# Patient Record
Sex: MALE | Race: WHITE | Employment: OTHER | URBAN - METROPOLITAN AREA
[De-identification: names, ages, dates, MRNs, and addresses within clinical notes are randomized per-mention and may not be internally consistent; named-entity substitution may affect disease eponyms.]

---

## 2017-03-31 ENCOUNTER — OFFICE VISIT (OUTPATIENT)
Dept: NEUROLOGY | Age: 62
End: 2017-03-31

## 2017-03-31 VITALS — WEIGHT: 244.8 LBS | OXYGEN SATURATION: 98 % | HEART RATE: 86 BPM | BODY MASS INDEX: 34.14 KG/M2

## 2017-03-31 DIAGNOSIS — G70.00 OCULAR MYASTHENIA GRAVIS (HCC): Primary | ICD-10-CM

## 2017-03-31 RX ORDER — FINASTERIDE 5 MG/1
5 TABLET, FILM COATED ORAL DAILY
Refills: 3 | COMMUNITY
Start: 2017-03-05

## 2017-03-31 NOTE — PROGRESS NOTES
Neurology Progress Note    Patient ID:  Phillip Rutherford  [id number]  14 y.o.  1955      Subjective:   History:  Mr. Phillip Rutherford is a 59 y.o. male who has a past medical history of Asthma; H/O seasonal allergies; Hyperlipemia; and Hypertension who was admitted at [facility] for diplopia that started last 8/6/16, noticeable on R gaze, associated with R eye discomfort. Patient (+) AChR Abs. CT chest negative for thymoma.      Mestinon 60 mg was increased to 4 times a day and prednisone 10 mg daily with note of improvement of symptoms. (+) increase saliva        Objective:   ROS:  Per HPI-  Otherwise 12 point ROS was negative    Meds:  Current Outpatient Prescriptions on File Prior to Visit   Medication Sig Dispense Refill    predniSONE (DELTASONE) 10 mg tablet Take 1 Tab by mouth daily. 90 Tab 3    pyridostigmine (MESTINON) 60 mg tablet Take 1 Tab by mouth four (4) times daily. 360 Tab 3    gabapentin (NEURONTIN) 600 mg tablet Take  by mouth two (2) times a day.  tamsulosin (FLOMAX) 0.4 mg capsule Take 0.4 mg by mouth nightly.  furosemide (LASIX) 20 mg tablet Take 20 mg by mouth daily.  fluticasone (FLONASE) 50 mcg/actuation nasal spray 2 Sprays by Both Nostrils route daily as needed.  montelukast (SINGULAIR) 10 mg tablet Take 10 mg by mouth daily.  levocetirizine (XYZAL) 5 mg tablet Take 5 mg by mouth daily.  albuterol (PROVENTIL HFA) 90 mcg/actuation inhaler Take 2 Puffs by inhalation every four (4) hours as needed.  aspirin delayed-release 81 mg tablet Take  by mouth daily.  cholecalciferol (VITAMIN D3) 1,000 unit cap Take 1,000 Units by mouth daily.  amLODIPine-atorvastatin (CADUET) 10-20 mg per tablet Take 1 Tab by mouth nightly.  irbesartan (AVAPRO) 300 mg tablet Take 300 mg by mouth nightly.  fenofibric acid (TRILIPIX ER) 135 mg capsule Take 135 mg by mouth nightly.       niacin-inositol (NIACIN FLUSH FREE) 400 mg niacin (500 mg) cap Take 1 Cap by mouth nightly.  potassium chloride SR (KLOR-CON 10) 10 mEq tablet Take 10 mEq by mouth two (2) times a day.  fluticasone-salmeterol (ADVAIR DISKUS) 250-50 mcg/dose diskus inhaler Take 1 Puff by inhalation every twelve (12) hours. No current facility-administered medications on file prior to visit. Imaging:    CT Results (recent): MRI Results (recent):      IR Results (recent):  No results found for this or any previous visit. VAS/US Results (recent):  No results found for this or any previous visit. Reviewed records in [employer] tab today    Lab Review           Exam:  Visit Vitals    Pulse 86    Wt 111 kg (244 lb 12.8 oz)    SpO2 98%    BMI 34.14 kg/m2     Gen: Awake, alert, follows commands  Appropriate appearance, normal speech. Oriented to all spheres. No visual field defect on confrontation exam.  Full eyes movement, with no nystagmus, no diplopia, no ptosis. Normal gag and swallow. All remaining cranial nerves were normal  Motor function: 5/5 in all extremities  Sensory: intact to LT, PP and JPS  Good FTN and HTS   Gait: Normal    Assessment:     1. Ocular myasthenia gravis ([address]            Plan:   1. Will try to decrease Prednisone further to 5 mg and 10 mg alternating days for 1 month; then 5 mg every day if no worsening  2. Continue Mestinon 60 mg to 4 times a day     Follow-up Disposition:  Return in about 3 months (around 6/30/2017).           Sukumar Rodriguez MD  Diplomate, American Board of Psychiatry and Neurology  Diplomate, Neuromuscular Medicine  Diplomate, American Board of Electrodiagnostic Medicine

## 2017-03-31 NOTE — PATIENT INSTRUCTIONS
10 [facility]   Statement to Patients  April 1, 2014      In an effort to ensure the large volume of patient prescription refills is processed in the most efficient and expeditious manner, we are asking our patients to assist us by calling your Pharmacy for all prescription refills, this will include also your  Mail Order Pharmacy. The pharmacy will contact our office electronically to continue the refill process. Please do not wait until the last minute to call your pharmacy. We need at least 48 hours (2days) to fill prescriptions. We also encourage you to call your pharmacy before going to  your prescription to make sure it is ready. With regard to controlled substance prescription refill requests (narcotic refills) that need to be picked up at our office, we ask your cooperation by providing us with at least 72 hours (3days) notice that you will need a refill. We will not refill narcotic prescription refill requests after 4:00pm on any weekday, Monday through Thursday, or after 2:00pm on Fridays, or on the weekends. We encourage everyone to explore another way of getting your prescription refill request processed using Blippex, our patient web portal through our electronic medical record system. Blippex is an efficient and effective way to communicate your medication request directly to the office and  downloadable as an sparkle on your smart phone . Blippex also features a review functionality that allows you to view your medication list as well as leave messages for your physician. Are you ready to get connected? If so please review the attatched instructions or speak to any of our staff to get you set up right away! Thank you so much for your cooperation. Should you have any questions please contact our Practice Administrator.     The Physicians and Staff,  [facility]

## 2017-03-31 NOTE — LETTER
3/31/2017 3:56 PM 
 
Patient:  Rogers Pill YOB: 1955 Date of Visit: 3/31/2017 Dear MD Jose Perez [address] [place] [id number] VIA Facsimile: [phone] 
 : Thank you for referring Mr. Rachel Fields to me for evaluation/treatment. Below are the relevant portions of my assessment and plan of care. If you have questions, please do not hesitate to call me. I look forward to following Mr. Gloria Denisemarikajessi along with you. Sincerely, Ivania Eric MD

## 2017-03-31 NOTE — MR AVS SNAPSHOT
Visit Information Date & Time Provider Department Dept. Phone Encounter #  
 3/31/2017  3:40 PM Jeana Guerrero MD Neurology [place] [address] [phone] [id number] Follow-up Instructions Return in about 3 months (around 6/30/2017). Upcoming Health Maintenance Date Due Hepatitis C Screening 1955 Pneumococcal 19-64 Medium Risk (1 of 1 - PPSV23) 1/15/1972 DTaP/Tdap/Td series (1 - Tdap) 1/15/1974 FOBT Q 1 YEAR AGE 50-75 1/15/2003 ZOSTER VACCINE AGE 60> 1/15/2013 INFLUENZA AGE 9 TO ADULT 8/1/2016 Allergies as of 3/31/2017  Review Complete On: 3/31/2017 By: Geovany French LPN No Known Allergies Current Immunizations  Never Reviewed No immunizations on file. Not reviewed this visit You Were Diagnosed With   
  
 Codes Comments Ocular [facility]henia gravis ([facility]    -  Primary ICD-10-CM: G70.00 ICD-9-CM: 358.00 Vitals Pulse Weight(growth percentile) SpO2 BMI Smoking Status 86 244 lb 12.8 oz (111 kg) 98% 34.14 kg/m2 Former Smoker BMI and BSA Data Body Mass Index Body Surface Area  
 34.14 kg/m 2 2.36 m 2 Preferred Pharmacy Pharmacy Name Phone [address] [place] [address], [address] [place] [address] [phone] Your Updated Medication List  
  
   
This list is accurate as of: 3/31/17  3:53 PM.  Always use your most recent med list.  
  
  
  
  
 Gavin Stands 250-50 mcg/dose diskus inhaler Generic drug:  fluticasone-salmeterol Take 1 Puff by inhalation every twelve (12) hours. aspirin delayed-release 81 mg tablet Take  by mouth daily. CADUET 10-20 mg per tablet Generic drug:  amLODIPine-atorvastatin Take 1 Tab by mouth nightly. fenofibric acid 135 mg capsule Commonly known as:  TRILIPIX ER Take 135 mg by mouth nightly. finasteride 5 mg tablet Commonly known as:  PROSCAR Take 5 mg by mouth daily. FLOMAX 0.4 mg capsule Generic drug:  tamsulosin Take 0.4 mg by mouth nightly. FLONASE 50 mcg/actuation nasal spray Generic drug:  fluticasone 2 Sprays by Both Nostrils route daily as needed. gabapentin 600 mg tablet Commonly known as:  NEURONTIN Take  by mouth two (2) times a day. irbesartan 300 mg tablet Commonly known as:  AVAPRO Take 300 mg by mouth nightly. KLOR-CON 10 10 mEq tablet Generic drug:  potassium chloride SR Take 10 mEq by mouth two (2) times a day. LASIX 20 mg tablet Generic drug:  furosemide Take 20 mg by mouth daily. levocetirizine 5 mg tablet Commonly known as:  Jeaneen Mor Take 5 mg by mouth daily. NIACIN FLUSH FREE 400 mg niacin (500 mg) Cap Generic drug:  niacin-inositol Take 1 Cap by mouth nightly. predniSONE 10 mg tablet Commonly known as:  Susen Arbour Take 1 Tab by mouth daily. PROVENTIL HFA 90 mcg/actuation inhaler Generic drug:  albuterol Take 2 Puffs by inhalation every four (4) hours as needed. pyridostigmine 60 mg tablet Commonly known as:  MESTINON Take 1 Tab by mouth four (4) times daily. SINGULAIR 10 mg tablet Generic drug:  montelukast  
Take 10 mg by mouth daily. VITAMIN D3 1,000 unit Cap Generic drug:  cholecalciferol Take 1,000 Units by mouth daily. Follow-up Instructions Return in about 3 months (around 6/30/2017). Patient Instructions PRESCRIPTION REFILL POLICY [facility] Statement to Patients April 1, 2014 In an effort to ensure the large volume of patient prescription refills is processed in the most efficient and expeditious manner, we are asking our patients to assist us by calling your Pharmacy for all prescription refills, this will include also your  Mail Order Pharmacy. The pharmacy will contact our office electronically to continue the refill process. Please do not wait until the last minute to call your pharmacy. We need at least 48 hours (2days) to fill prescriptions. We also encourage you to call your pharmacy before going to  your prescription to make sure it is ready. With regard to controlled substance prescription refill requests (narcotic refills) that need to be picked up at our office, we ask your cooperation by providing us with at least 72 hours (3days) notice that you will need a refill. We will not refill narcotic prescription refill requests after 4:00pm on any weekday, Monday through Thursday, or after 2:00pm on Fridays, or on the weekends. We encourage everyone to explore another way of getting your prescription refill request processed using Biomoda, our patient web portal through our electronic medical record system. Biomoda is an efficient and effective way to communicate your medication request directly to the office and  downloadable as an sparkle on your smart phone . Biomoda also features a review functionality that allows you to view your medication list as well as leave messages for your physician. Are you ready to get connected? If so please review the attatched instructions or speak to any of our staff to get you set up right away! Thank you so much for your cooperation. Should you have any questions please contact our Practice Administrator. The Physicians and Staff,  [facility]! Dear Kourtney Mitchell: Thank you for requesting a Biomoda account. Our records indicate that you already have an active Biomoda account. You can access your account anytime at [email or web address] Did you know that you can access your hospital and ER discharge instructions at any time in Biomoda? You can also review all of your test results from your hospital stay or ER visit. Additional Information If you have questions, please visit the Frequently Asked Questions section of the [email or web address] website at [email or web address] Remember, Redeem&Get is NOT to be used for urgent needs. For medical emergencies, dial 911. Now available from your iPhone and Android! Please provide this summary of care documentation to your next provider. Your primary care clinician is listed as Itzel Dorantes. If you have any questions after today's visit, please call [phone].

## 2017-05-01 ENCOUNTER — TELEPHONE (OUTPATIENT)
Dept: NEUROLOGY | Age: 62
End: 2017-05-01

## 2017-05-01 NOTE — TELEPHONE ENCOUNTER
----- Message from Myrna Ozuna sent at 5/1/2017  9:17 AM EDT -----  Regarding: /Telephone  Pt is requesting a callback in regards to having issues with vision also\" Prednizone' medication provided. Best contact [phone].

## 2017-05-01 NOTE — TELEPHONE ENCOUNTER
TC- Returned call to patient no answer left message on vm to increase Prednisone back to 10 mg every day.

## 2017-05-03 RX ORDER — PREDNISONE 10 MG/1
30 TABLET ORAL DAILY
Qty: 90 TAB | Refills: 2 | Status: SHIPPED | OUTPATIENT
Start: 2017-05-03 | End: 2017-07-17 | Stop reason: DRUGHIGH

## 2017-05-03 NOTE — TELEPHONE ENCOUNTER
Order placed for Prednisone 30 mg daily, # 90 tabs # 2 refills, PO, per Verbal Order from Dr. Anusha Reynolds on 5/3/2017 due to Myasthenia Gravis.

## 2017-07-17 ENCOUNTER — OFFICE VISIT (OUTPATIENT)
Dept: NEUROLOGY | Age: 62
End: 2017-07-17

## 2017-07-17 VITALS
HEART RATE: 87 BPM | WEIGHT: 236 LBS | BODY MASS INDEX: 32.92 KG/M2 | OXYGEN SATURATION: 97 % | DIASTOLIC BLOOD PRESSURE: 62 MMHG | SYSTOLIC BLOOD PRESSURE: 118 MMHG

## 2017-07-17 DIAGNOSIS — G70.00 OCULAR MYASTHENIA GRAVIS (HCC): ICD-10-CM

## 2017-07-17 DIAGNOSIS — G70.00 MYASTHENIA GRAVIS (HCC): ICD-10-CM

## 2017-07-17 RX ORDER — PREDNISONE 10 MG/1
60 TABLET ORAL DAILY
Qty: 180 TAB | Refills: 2 | Status: SHIPPED | OUTPATIENT
Start: 2017-07-17 | End: 2017-10-20 | Stop reason: SDUPTHER

## 2017-07-17 RX ORDER — PYRIDOSTIGMINE BROMIDE 60 MG/1
60 TABLET ORAL
Qty: 450 TAB | Refills: 3 | Status: SHIPPED | OUTPATIENT
Start: 2017-07-17 | End: 2017-10-20 | Stop reason: SDUPTHER

## 2017-07-17 NOTE — LETTER
7/17/2017 4:36 PM 
 
Patient:  Kelsey Christian YOB: 1955 Date of Visit: 7/17/2017 Dear Morelia Martinez MD 
[facility] [address] [facility] [address] VIA Facsimile: [phone] 
 : Thank you for referring Mr. Kenia Maxwell to me for evaluation/treatment. Below are the relevant portions of my assessment and plan of care. If you have questions, please do not hesitate to call me. I look forward to following  Margarito Maravilla along with you. Sincerely, Arvin Barraza MD

## 2017-07-17 NOTE — MR AVS SNAPSHOT
Visit Information Date & Time Provider Department Dept. Phone Encounter #  
 7/17/2017  3:40 PM MD Jessica Perez Neurology [facility] [phone] [id number] Follow-up Instructions Return in about 1 month (around 8/17/2017). Upcoming Health Maintenance Date Due Hepatitis C Screening 1955 Pneumococcal 19-64 Medium Risk (1 of 1 - PPSV23) 1/15/1972 DTaP/Tdap/Td series (1 - Tdap) 1/15/1974 FOBT Q 1 YEAR AGE 50-75 1/15/2003 ZOSTER VACCINE AGE 60> 1/15/2013 INFLUENZA AGE 9 TO ADULT 8/1/2017 Allergies as of 7/17/2017  Review Complete On: 7/17/2017 By: Lance Zamora LPN No Known Allergies Current Immunizations  Never Reviewed No immunizations on file. Not reviewed this visit You Were Diagnosed With   
  
 Codes Comments [facility]asthenia gravis ([facility]     ICD-10-CM: G70.00 ICD-9-CM: 358.00 Ocular myasthenia gravis ([facility]     ICD-10-CM: G70.00 ICD-9-CM: 358.00 Vitals BP Pulse Weight(growth percentile) SpO2 BMI Smoking Status 118/62 87 236 lb (107 kg) 97% 32.92 kg/m2 Former Smoker BMI and BSA Data Body Mass Index Body Surface Area  
 32.92 kg/m 2 2.32 m 2 Preferred Pharmacy Pharmacy Name Phone [address] [place] [address], [address] [place] [address] [phone] Your Updated Medication List  
  
   
This list is accurate as of: 7/17/17  4:34 PM.  Always use your most recent med list.  
  
  
  
  
 Maikel Cousin 250-50 mcg/dose diskus inhaler Generic drug:  fluticasone-salmeterol Take 1 Puff by inhalation every twelve (12) hours. aspirin delayed-release 81 mg tablet Take  by mouth daily. CADUET 10-20 mg per tablet Generic drug:  amLODIPine-atorvastatin Take 1 Tab by mouth nightly. fenofibric acid 135 mg capsule Commonly known as:  TRILIPIX ER Take 135 mg by mouth nightly. finasteride 5 mg tablet Commonly known as:  PROSCAR  
 Take 5 mg by mouth daily. FLOMAX 0.4 mg capsule Generic drug:  tamsulosin Take 0.4 mg by mouth nightly. FLONASE 50 mcg/actuation nasal spray Generic drug:  fluticasone 2 Sprays by Both Nostrils route daily as needed. gabapentin 600 mg tablet Commonly known as:  NEURONTIN Take  by mouth two (2) times a day. irbesartan 300 mg tablet Commonly known as:  AVAPRO Take 300 mg by mouth nightly. KLOR-CON 10 10 mEq tablet Generic drug:  potassium chloride SR Take 10 mEq by mouth two (2) times a day. LASIX 20 mg tablet Generic drug:  furosemide Take 20 mg by mouth daily. levocetirizine 5 mg tablet Commonly known as:  Johanna Jbphh Take 5 mg by mouth daily. NIACIN FLUSH FREE 400 mg niacin (500 mg) Cap Generic drug:  niacin-inositol Take 1 Cap by mouth nightly. predniSONE 10 mg tablet Commonly known as:  Alvie Karluk Take 6 Tabs by mouth daily. PROVENTIL HFA 90 mcg/actuation inhaler Generic drug:  albuterol Take 2 Puffs by inhalation every four (4) hours as needed. pyridostigmine 60 mg tablet Commonly known as:  MESTINON Take 1 Tab by mouth five (5) times daily. SINGULAIR 10 mg tablet Generic drug:  montelukast  
Take 10 mg by mouth daily. VITAMIN D3 1,000 unit Cap Generic drug:  cholecalciferol Take 1,000 Units by mouth daily. Prescriptions Sent to Pharmacy Refills  
 predniSONE (DELTASONE) 10 mg tablet 2 Sig: Take 6 Tabs by mouth daily. Class: Normal  
 Pharmacy: [address] [place] [address] Ph #: [phone] Route: Oral  
 pyridostigmine (MESTINON) 60 mg tablet 3 Sig: Take 1 Tab by mouth five (5) times daily. Class: Normal  
 Pharmacy: [address] [place] [address] Ph #: [phone] Route: Oral  
  
Follow-up Instructions Return in about 1 month (around 8/17/2017). Introducing [address]! Dear Adrien Atwood: Thank you for requesting a [employer] account. Our records indicate that you already have an active [employer] account. You can access your account anytime at [email or web address] Did you know that you can access your hospital and ER discharge instructions at any time in [employer]? You can also review all of your test results from your hospital stay or ER visit. Additional Information If you have questions, please visit the Frequently Asked Questions section of the E[employer] website at [email or web address] Remember, [employer] is NOT to be used for urgent needs. For medical emergencies, dial 911. Now available from your iPhone and Android! Please provide this summary of care documentation to your next provider. Your primary care clinician is listed as Itzel Dorantes. If you have any questions after today's visit, please call [phone].

## 2017-07-17 NOTE — PROGRESS NOTES
Neurology Progress Note    Patient ID:  Anup Delgadillo  [id number]  36 y.o.  1955      Subjective:   History:  Mr. Anup Delgadillo is a 57 y.o. Male with Asthma; H/O seasonal allergies; Hyperlipemia; and Hypertension who was admitted at [facility] for diplopia that started last 8/6/16, noticeable on R gaze, associated with R eye discomfort. Patient (+) AChR Abs. CT chest negative for thymoma.      Patient now on Mestinon 60 mg 4 times a day and had to increase Prednisone to 10 mg 3 tabs daily due to increasing diplopia, blurred vision and swallowing issues with increase saliva. (-) SOB  (-) fever    Admits to seasonal allergies needing to take Xyzal and Zyrtec.     Social Hx  Social History     Social History    Marital status:      Spouse name: N/A    Number of children: N/A    Years of education: N/A     Social History Main Topics    Smoking status: Former Smoker    Smokeless tobacco: Never Used    Alcohol use No    Drug use: None    Sexual activity: Not Asked     Other Topics Concern    None     Social History Narrative       Objective:   ROS:  Per HPI-  Otherwise the remainder of ROS was negative    Meds:  Current Outpatient Prescriptions on File Prior to Visit   Medication Sig Dispense Refill    finasteride (PROSCAR) 5 mg tablet Take 5 mg by mouth daily. 3    gabapentin (NEURONTIN) 600 mg tablet Take  by mouth two (2) times a day.  tamsulosin (FLOMAX) 0.4 mg capsule Take 0.4 mg by mouth nightly.  furosemide (LASIX) 20 mg tablet Take 20 mg by mouth daily.  fluticasone (FLONASE) 50 mcg/actuation nasal spray 2 Sprays by Both Nostrils route daily as needed.  montelukast (SINGULAIR) 10 mg tablet Take 10 mg by mouth daily.  levocetirizine (XYZAL) 5 mg tablet Take 5 mg by mouth daily.  albuterol (PROVENTIL HFA) 90 mcg/actuation inhaler Take 2 Puffs by inhalation every four (4) hours as needed.  aspirin delayed-release 81 mg tablet Take  by mouth daily.       cholecalciferol (VITAMIN D3) 1,000 unit cap Take 1,000 Units by mouth daily.  amLODIPine-atorvastatin (CADUET) 10-20 mg per tablet Take 1 Tab by mouth nightly.  irbesartan (AVAPRO) 300 mg tablet Take 300 mg by mouth nightly.  fenofibric acid (TRILIPIX ER) 135 mg capsule Take 135 mg by mouth nightly.  niacin-inositol (NIACIN FLUSH FREE) 400 mg niacin (500 mg) cap Take 1 Cap by mouth nightly.  potassium chloride SR (KLOR-CON 10) 10 mEq tablet Take 10 mEq by mouth two (2) times a day.  fluticasone-salmeterol (ADVAIR DISKUS) 250-50 mcg/dose diskus inhaler Take 1 Puff by inhalation every twelve (12) hours. No current facility-administered medications on file prior to visit. Imaging:    CT Results (recent): MRI Results (recent):      IR Results (recent):  No results found for this or any previous visit. VAS/US Results (recent):  No results found for this or any previous visit. Reviewed records in [employer] tab today    Lab Review           Exam:  Visit Vitals    /62    Pulse 87    Wt 107 kg (236 lb)    SpO2 97%    BMI 32.92 kg/m2     Gen: Awake, alert, follows commands  Appropriate appearance, normal speech. Oriented to all spheres. No visual field defect on confrontation exam.  Full eyes movement, with no nystagmus, no diplopia, no ptosis. Normal gag and swallow. All remaining cranial nerves were normal  Motor function: 5/5 in all extremities  Sensory: intact to LT, PP and JPS  DTRs ++ in all extremities, (-) Babinski  Good FTN and HTS   Gait: Normal    Assessment:     1. Myasthenia gravis ([address]    2. Ocular myasthenia gravis ([address]            Plan: 1. Increased Mestinon 60 mg 5 times a day   2. Increase prednisone 10 mg 6 tabs (60 mg) daily   3.  Advise to stop Zyrtec and Xyzal for several days prior to increasing Prednisone to see if they are exacerbating his myasthenia gravis      Follow-up Disposition:  Return in about 1 month (around 8/17/2017).           Nasima Vega MD  Diplomate, American Board of Psychiatry and Neurology  Diplomate, Neuromuscular Medicine  Diplomate, American Board of Electrodiagnostic Medicine

## 2017-08-18 ENCOUNTER — OFFICE VISIT (OUTPATIENT)
Dept: NEUROLOGY | Age: 62
End: 2017-08-18

## 2017-08-18 VITALS — DIASTOLIC BLOOD PRESSURE: 65 MMHG | OXYGEN SATURATION: 95 % | HEART RATE: 84 BPM | SYSTOLIC BLOOD PRESSURE: 115 MMHG

## 2017-08-18 DIAGNOSIS — G70.00 OCULAR MYASTHENIA GRAVIS (HCC): Primary | ICD-10-CM

## 2017-08-18 NOTE — MR AVS SNAPSHOT
Visit Information Date & Time Provider Department Dept. Phone Encounter #  
 8/18/2017 11:40 AM Monika James MD [facility] Neurology [facility] [phone] [id number] Follow-up Instructions Return in about 2 months (around 10/18/2017). Follow-up and Disposition History Your Appointments 10/20/2017 10:40 AM  
Follow Up with Monika James MD  
[facility] Appt Note: Myasthenia Gravis [place] [address] [facility] [id number] [phone]  
  
   
 [place] [address] [id number] [address] Upcoming Health Maintenance Date Due Hepatitis C Screening 1955 Pneumococcal 19-64 Medium Risk (1 of 1 - PPSV23) 1/15/1972 DTaP/Tdap/Td series (1 - Tdap) 1/15/1974 FOBT Q 1 YEAR AGE 50-75 1/15/2003 ZOSTER VACCINE AGE 60> 11/15/2012 INFLUENZA AGE 9 TO ADULT 8/1/2017 Allergies as of 8/18/2017  Review Complete On: 8/18/2017 By: Guillaume Barron No Known Allergies Current Immunizations  Never Reviewed No immunizations on file. Not reviewed this visit You Were Diagnosed With   
  
 Codes Comments O[facility]    -  Primary ICD-10-CM: G70.00 ICD-9-CM: 358.00 Vitals BP Pulse SpO2 Smoking Status 115/65 84 95% Former Smoker Preferred Pharmacy Pharmacy Name Phone [address] [place] [address], [facility] Elisa Harris [phone] Your Updated Medication List  
  
   
This list is accurate as of: 8/18/17 12:15 PM.  Always use your most recent med list.  
  
  
  
  
 Donte Lalo 250-50 mcg/dose diskus inhaler Generic drug:  fluticasone-salmeterol Take 1 Puff by inhalation every twelve (12) hours. aspirin delayed-release 81 mg tablet Take  by mouth daily. CADUET 10-20 mg per tablet Generic drug:  amLODIPine-atorvastatin Take 1 Tab by mouth nightly. fenofibric acid 135 mg capsule Commonly known as:  TRILIPIX ER Take 135 mg by mouth nightly. finasteride 5 mg tablet Commonly known as:  PROSCAR Take 5 mg by mouth daily. FLOMAX 0.4 mg capsule Generic drug:  tamsulosin Take 0.4 mg by mouth nightly. FLONASE 50 mcg/actuation nasal spray Generic drug:  fluticasone 2 Sprays by Both Nostrils route daily as needed. gabapentin 600 mg tablet Commonly known as:  NEURONTIN Take  by mouth two (2) times a day. irbesartan 300 mg tablet Commonly known as:  AVAPRO Take 300 mg by mouth nightly. KLOR-CON 10 10 mEq tablet Generic drug:  potassium chloride SR Take 10 mEq by mouth two (2) times a day. LASIX 20 mg tablet Generic drug:  furosemide Take 20 mg by mouth daily. levocetirizine 5 mg tablet Commonly known as:  Dana Meager Take 5 mg by mouth daily. NIACIN FLUSH FREE 400 mg niacin (500 mg) Cap Generic drug:  niacin-inositol Take 1 Cap by mouth nightly. predniSONE 10 mg tablet Commonly known as:  Edrie Power Take 6 Tabs by mouth daily. PROVENTIL HFA 90 mcg/actuation inhaler Generic drug:  albuterol Take 2 Puffs by inhalation every four (4) hours as needed. pyridostigmine 60 mg tablet Commonly known as:  MESTINON Take 1 Tab by mouth five (5) times daily. SINGULAIR 10 mg tablet Generic drug:  montelukast  
Take 10 mg by mouth daily. VITAMIN D3 1,000 unit Cap Generic drug:  cholecalciferol Take 1,000 Units by mouth daily. Follow-up Instructions Return in about 2 months (around 10/18/2017). Patient Instructions PRESCRIPTION REFILL POLICY [facility] Statement to Patients April 1, 2014 In an effort to ensure the large volume of patient prescription refills is processed in the most efficient and expeditious manner, we are asking our patients to assist us by calling your Pharmacy for all prescription refills, this will include also your  Mail Order Pharmacy. The pharmacy will contact our office electronically to continue the refill process. Please do not wait until the last minute to call your pharmacy. We need at least 48 hours (2days) to fill prescriptions. We also encourage you to call your pharmacy before going to  your prescription to make sure it is ready. With regard to controlled substance prescription refill requests (narcotic refills) that need to be picked up at our office, we ask your cooperation by providing us with at least 72 hours (3days) notice that you will need a refill. We will not refill narcotic prescription refill requests after 4:00pm on any weekday, Monday through Thursday, or after 2:00pm on Fridays, or on the weekends. We encourage everyone to explore another way of getting your prescription refill request processed using [employer], our patient web portal through our electronic medical record system. [employer] is an efficient and effective way to communicate your medication request directly to the office and  downloadable as an sparkle on your smart phone . [employer] also features a review functionality that allows you to view your medication list as well as leave messages for your physician. Are you ready to get connected? If so please review the attatched instructions or speak to any of our staff to get you set up right away! Thank you so much for your cooperation. Should you have any questions please contact our Practice Administrator. The Physicians and Staff,  [address] [place] [address] Neurology Clinic [facility]! Dear Deandre Vanessa: Thank you for requesting a [employer] account. Our records indicate that you already have an active [employer] account. You can access your account anytime at [email or web address] Did you know that you can access your hospital and ER discharge instructions at any time in [employer]? You can also review all of your test results from your hospital stay or ER visit. Additional Information If you have questions, please visit the Frequently Asked Questions section of the [employer] website at [email or web address] Remember, [employer] is NOT to be used for urgent needs. For medical emergencies, dial 911. Now available from your iPhone and Android! Please provide this summary of care documentation to your next provider. Your primary care clinician is listed as Itzel Dorantes. If you have any questions after today's visit, please call [phone].

## 2017-08-18 NOTE — PROGRESS NOTES
Patient says he has been improving since he started the higher doses of prednisone. He says the Mestinon wears off after 2 hours and he starts to have trouble with his speech and vision.

## 2017-08-18 NOTE — PROGRESS NOTES
Neurology Progress Note    Patient ID:  Lashawn Mesa  [id number]  47 y.o.  1955      Subjective:   History:  Mr. Lashawn Mesa is a 57 y.o. Male with Asthma; H/O seasonal allergies; Hyperlipemia; and Hypertension who was admitted at [facility] for [address] started last 8/6/16, noticeable on R gaze, associated with R eye discomfort. Patient (+) AChR Abs. CT chest negative for thymoma.      Patient was placed on Prednisone 60 mg for several days then tapered down to now on 10 mg for 2 weeks with no worsening. Patient now on Mestinon 60 mg 5 times/ day but still note wearing off 30 mins prior to next dose. Tried stopping his allergy medicine but made his sinus worse. ROS:  Per HPI-  Otherwise the remainder of ROS was negative    Social Hx  Social History     Social History    Marital status:      Spouse name: N/A    Number of children: N/A    Years of education: N/A     Social History Main Topics    Smoking status: Former Smoker    Smokeless tobacco: Never Used    Alcohol use No    Drug use: None    Sexual activity: Not Asked     Other Topics Concern    None     Social History Narrative       Meds:  Current Outpatient Prescriptions on File Prior to Visit   Medication Sig Dispense Refill    predniSONE (DELTASONE) 10 mg tablet Take 6 Tabs by mouth daily. 180 Tab 2    pyridostigmine (MESTINON) 60 mg tablet Take 1 Tab by mouth five (5) times daily. 450 Tab 3    finasteride (PROSCAR) 5 mg tablet Take 5 mg by mouth daily. 3    gabapentin (NEURONTIN) 600 mg tablet Take  by mouth two (2) times a day.  tamsulosin (FLOMAX) 0.4 mg capsule Take 0.4 mg by mouth nightly.  furosemide (LASIX) 20 mg tablet Take 20 mg by mouth daily.  fluticasone (FLONASE) 50 mcg/actuation nasal spray 2 Sprays by Both Nostrils route daily as needed.  levocetirizine (XYZAL) 5 mg tablet Take 5 mg by mouth daily.       albuterol (PROVENTIL HFA) 90 mcg/actuation inhaler Take 2 Puffs by inhalation every four (4) hours as needed.  aspirin delayed-release 81 mg tablet Take  by mouth daily.  cholecalciferol (VITAMIN D3) 1,000 unit cap Take 1,000 Units by mouth daily.  amLODIPine-atorvastatin (CADUET) 10-20 mg per tablet Take 1 Tab by mouth nightly.  irbesartan (AVAPRO) 300 mg tablet Take 300 mg by mouth nightly.  fenofibric acid (TRILIPIX ER) 135 mg capsule Take 135 mg by mouth nightly.  niacin-inositol (NIACIN FLUSH FREE) 400 mg niacin (500 mg) cap Take 1 Cap by mouth nightly.  potassium chloride SR (KLOR-CON 10) 10 mEq tablet Take 10 mEq by mouth two (2) times a day.  fluticasone-salmeterol (ADVAIR DISKUS) 250-50 mcg/dose diskus inhaler Take 1 Puff by inhalation every twelve (12) hours.  montelukast (SINGULAIR) 10 mg tablet Take 10 mg by mouth daily. No current facility-administered medications on file prior to visit. Imaging:    CT Results (recent):    Results from Hospital Encounter encounter on 10/11/16   CT CHEST W CONT   Narrative INDICATION: Myasthenia gravis. Evaluate for thymoma. COMPARISON: December 18, 2009 abdomen CT    TECHNIQUE:  Following the uneventful intravenous administration of 100 cc  Isovue-300, 5 mm axial images were obtained through the chest. CT dose reduction  was achieved through use of a standardized protocol tailored for this  examination and automatic exposure control for dose modulation. FINDINGS:    THYROID: No nodule. MEDIASTINUM: No mass or lymphadenopathy. No residual thymic tissue is  identified. JANESSA: No mass or lymphadenopathy. THORACIC AORTA: No dissection or aneurysm. MAIN PULMONARY ARTERY: Normal in caliber. TRACHEA/BRONCHI: Patent. ESOPHAGUS: No wall thickening or dilatation. HEART: Normal in size. Tiny pericardial effusion. Coronary artery calcifications  are present. PLEURA: No effusion or pneumothorax. LUNGS: 3 mm left upper lobe pulmonary nodule (series 2, image 35). Otherwise  clear.   INCIDENTALLY IMAGED UPPER ABDOMEN: No focal abnormality. BONES: No destructive bone lesion. Impression IMPRESSION:  No evidence of thymoma. Indeterminate 3 mm left upper lobe pulmonary nodule;  please see follow-up recommendations below. RECOMMENDATIONS FOR FOLLOW-UP AND MANAGEMENT OF NODULES SMALLER THAN 8 MM  DETECTED INCIDENTALLY AT NONSCREENING CT:    LOW-RISK PATIENTS:    LESS THAN OR EQUAL TO 4 MM:  No follow-up needed. GREATER THAN 4-6 MM:  Follow-up CT at 12 mo; if unchanged, no further follow-up. GREATER THAN 6-8 MM:  Initial follow-up CT at 6-12 months then at 18-24 months  if no change. GREATER THAN 8 MM:  Follow-up CT at around 3, 9, and 24 months, dynamic  contrast-enhanced CT, PET, and/or biopsy. HIGH-RISK PATIENTS:    LESS THAN OR EQUAL TO 4 MM:  Follow-up CT at 12 months; if unchanged, no further  follow-up. GREATER THAN 4-6 MM:  Initial follow-up CT at 6-12 months then at 18-24 months  if no change. GREATER THAN 6-8 MM:  Initial follow-up CT at 3-6 months then at 9-12 and 24  months if no change. GREATER THAN 8 MM:  Same as for low-risk patient. MRI Results (recent):    Results from [place] encounter on 09/09/16   MRI BRAIN WO CONT   Addendum Addendum: INDICATION: TIA/CVA. Double vision. EXAM: MRA of the intracranial vasculature was performed. FINDINGS:  The intracranial internal carotid arteries demonstrate normal signal. The  middle and anterior cerebral vessels demonstrate normal signal without major  occlusive change or obvious aneurysm or vascular malformation. The vertebrobasilar system is patent. The posterior cerebral arteries demonstrate normal signal.  IMPRESSION:  No definite major occlusive changes involving the intracranial  vasculature. No definite aneurysm or vascular malformation.        Soren Lora MD 9/10/2016  1:29 AM             Narrative *PRELIMINARY REPORT*    No evidence for acute infarction or acute intracranial abnormality. Mild white  matter disease is nonspecific but may represent changes of chronic small vessel  ischemic disease. MRA of the brain without evidence for acute large vessel arterial occlusion or  significant stenosis. No evidence for aneurysm    Preliminary report was provided by Dr. Raydell Buerger, the on-call radiologist, at  10:27 PM 9/9/2016    Final report to follow. *END PRELIMINARY REPORT*    INDICATION: TIA/CVA. Double vision. EXAM: Sagittal and axial and coronal images were obtained through the brain in  varying sequences. T1-weighted, T2-weighted, FLAIR, GRE, Diffusion-weighted  sequences may be utilized. FINDINGS:    Comparison study: None are available. Sagittal images demonstrate moderate prominence of the basilar cisterns,  cortical sulci, and ventricles. Signal void is present in the cavernous carotid  arteries bilaterally. Axial and coronal images demonstrate no confluent infarct or hemorrhage or mass  effect. Mild multifocal hyperintensities are present in the periventricular deep  white matter and centrum semiovale likely microvascular changes related to  aging. Diffusion-weighted images demonstrate no acute infarct. Impression IMPRESSION:    No acute infarct, hemorrhage, or mass effect. IR Results (recent):  No results found for this or any previous visit. VAS/US Results (recent):  No results found for this or any previous visit. Reviewed records in [email or web address] and media tab today    Lab Review     No visits with results within 3 Month(s) from this visit.   Latest known visit with results is:    Admission on 09/09/2016, Discharged on 09/10/2016   Component Date Value Ref Range Status    Ventricular Rate 09/09/2016 78  BPM Final    Atrial Rate 09/09/2016 78  BPM Final    P-R Interval 09/09/2016 186  ms Final    QRS Duration 09/09/2016 90  ms Final    Q-T Interval 09/09/2016 356  ms Final    QTC Calculation (Bezet) 09/09/2016 405  ms Final    Calculated P Axis 09/09/2016 56  degrees Final    Calculated R Axis 09/09/2016 8  degrees Final    Calculated T Axis 09/09/2016 41  degrees Final    Diagnosis 09/09/2016    Final                    Value:Normal sinus rhythm  Normal ECG  No previous ECGs available  Confirmed by Anthony Vivas MD ([id number]) on 9/10/2016 11:38:24 AM      WBC 09/09/2016 6.8  4.1 - 11.1 K/uL Final    RBC 09/09/2016 4.28  4.10 - 5.70 M/uL Final    HGB 09/09/2016 12.4  12.1 - 17.0 g/dL Final    HCT 09/09/2016 38.4  36.6 - 50.3 % Final    MCV 09/09/2016 89.7  80.0 - 99.0 FL Final    MCH 09/09/2016 29.0  26.0 - 34.0 PG Final    MCHC 09/09/2016 32.3  30.0 - 36.5 g/dL Final    RDW 09/09/2016 15.0* 11.5 - 14.5 % Final    PLATELET 18/12/5710 026  150 - 400 K/uL Final    NEUTROPHILS 09/09/2016 58  32 - 75 % Final    LYMPHOCYTES 09/09/2016 31  12 - 49 % Final    MONOCYTES 09/09/2016 8  5 - 13 % Final    EOSINOPHILS 09/09/2016 2  0 - 7 % Final    BASOPHILS 09/09/2016 1  0 - 1 % Final    ABS. NEUTROPHILS 09/09/2016 4.0  1.8 - 8.0 K/UL Final    ABS. LYMPHOCYTES 09/09/2016 2.1  0.8 - 3.5 K/UL Final    ABS. MONOCYTES 09/09/2016 0.6  0.0 - 1.0 K/UL Final    ABS. EOSINOPHILS 09/09/2016 0.1  0.0 - 0.4 K/UL Final    ABS.  BASOPHILS 09/09/2016 0.1  0.0 - 0.1 K/UL Final    Sodium 09/09/2016 142  136 - 145 mmol/L Final    Potassium 09/09/2016 4.1  3.5 - 5.1 mmol/L Final    Chloride 09/09/2016 107  97 - 108 mmol/L Final    CO2 09/09/2016 29  21 - 32 mmol/L Final    Anion gap 09/09/2016 6  5 - 15 mmol/L Final    Glucose 09/09/2016 91  65 - 100 mg/dL Final    BUN 09/09/2016 31* 6 - 20 MG/DL Final    Creatinine 09/09/2016 1.27  0.70 - 1.30 MG/DL Final    BUN/Creatinine ratio 09/09/2016 24* 12 - 20   Final    GFR est AA 09/09/2016 >60  >60 ml/min/1.73m2 Final    GFR est non-AA 09/09/2016 57* >60 ml/min/1.73m2 Final    Comment: Estimated GFR is calculated using the IDMS-traceable Modification of Diet in Renal Disease (MDRD) Study equation, reported for both  Americans (GFRAA) and non- Americans (GFRNA), and normalized to 1.73m2 body surface area. The physician must decide which value applies to the patient. The MDRD study equation should only be used in individuals age 25 or older. It has not been validated for the following: pregnant women, patients with serious comorbid conditions, or on certain medications, or persons with extremes of body size, muscle mass, or nutritional status.  Calcium 09/09/2016 8.9  8.5 - 10.1 MG/DL Final    Bilirubin, total 09/09/2016 0.3  0.2 - 1.0 MG/DL Final    ALT (SGPT) 09/09/2016 25  12 - 78 U/L Final    AST (SGOT) 09/09/2016 11* 15 - 37 U/L Final    Alk. phosphatase 09/09/2016 38* 45 - 117 U/L Final    Protein, total 09/09/2016 6.9  6.4 - 8.2 g/dL Final    Albumin 09/09/2016 3.9  3.5 - 5.0 g/dL Final    Globulin 09/09/2016 3.0  2.0 - 4.0 g/dL Final    A-G Ratio 09/09/2016 1.3  1.1 - 2.2   Final    Troponin-I, Qt. 09/09/2016 <0.04  <0.05 ng/mL Final    Comment: The presence of detectable troponin above the reference range indicates myocardial injury which may be due to ischemia, myocarditis, trauma, etc.  Clinical correlation is necessary to establish the significance of this finding. Sequential testing is recommended to determine if the typical rise and fall of cTnI is demonstrated. Note:  Cardiac troponin I has a relatively long half life and may be present well after the CK MB has returned to baseline. The reference range is based on the 99th percentile of the referent population.       Color 09/09/2016 YELLOW/STRAW    Final    Color Reference Range: Straw, Yellow or Dark Yellow    Appearance 09/09/2016 CLEAR  CLEAR   Final    Specific gravity 09/09/2016 1.025  1.003 - 1.030   Final    pH (UA) 09/09/2016 6.0  5.0 - 8.0   Final    Protein 09/09/2016 NEGATIVE   NEG mg/dL Final    Glucose 09/09/2016 NEGATIVE   NEG mg/dL Final    Ketone 09/09/2016 NEGATIVE   NEG mg/dL Final    Bilirubin 09/09/2016 NEGATIVE   NEG   Final    Blood 09/09/2016 NEGATIVE   NEG   Final    Urobilinogen 09/09/2016 0.2  0.2 - 1.0 EU/dL Final    Nitrites 09/09/2016 NEGATIVE   NEG   Final    Leukocyte Esterase 09/09/2016 NEGATIVE   NEG   Final    UA:UC IF INDICATED 09/09/2016 CULTURE NOT INDICATED BY UA RESULT  CNI   Final    WBC 09/09/2016 0-4  0 - 4 /hpf Final    RBC 09/09/2016 0-5  0 - 5 /hpf Final    Epithelial cells 09/09/2016 FEW  FEW /lpf Final    Epithelial cell category consists of squamous cells and /or transitional urothelial cells. Renal tubular cells, if present, are separately identified as such.  Bacteria 09/09/2016 NEGATIVE   NEG /hpf Final    Hyaline cast 09/09/2016 0-2  0 - 5 /lpf Final    AChR Binding Ab, serum 09/09/2016 0.55* 0.00 - 0.24 nmol/L Final    Comment: (NOTE)                                Negative:   0.00 - 0.24                                Borderline: 0.25 - 0.40                                Positive:        > 0.40  [facility]formed At: [facility] [address] [id number]  Jeffrey Jackson MD PO:[id number]      AChR Blocking Ab 09/09/2016 30* 0 - 25 % Final    Comment: (NOTE)                                Negative:      0 - 25                                Borderline:   26 - 30                                Positive:         >30  Results for this test are for research purposes  only by the assay's . The performance  characteristics of this product have not been  established. Results should not be used as a  diagnostic procedure without confirmation of the  diagnosis by another medically established  diagnostic product or procedure.   Performed At: [facility] [address] [id number]  Jeffrey Jackson MD RD:[id number]      AChR Modulating Ab 09/09/2016 23* 0 - 20 % Final    Comment: (NOTE)                               Negative:          <21                               Equivocal:     21 - 25                               Positive:          >25  The assay is linear between values of 12 and 64. Those <12 and >64 are reported as such. No single  value for ACR-modulating antibody should be used as  a sole basis for diagnosis or response to therapy. Performed At: [address] [id number]  Morteza Dalal MD OF:[id number]      Hemoglobin A1c 09/09/2016 5.6  4.2 - 6.3 % Final    Est. average glucose 09/09/2016 114  mg/dL Final    Comment: (NOTE)  The eAG should be interpreted with patient characteristics in mind   since ethnicity, interindividual differences, red cell lifespan,   variation in rates of glycation, etc. may affect the validity of the   calculation.  Sed rate, automated 09/09/2016 7  0 - 20 mm/hr Final    Sodium 09/10/2016 141  136 - 145 mmol/L Final    Potassium 09/10/2016 3.7  3.5 - 5.1 mmol/L Final    Chloride 09/10/2016 108  97 - 108 mmol/L Final    CO2 09/10/2016 25  21 - 32 mmol/L Final    Anion gap 09/10/2016 8  5 - 15 mmol/L Final    Glucose 09/10/2016 90  65 - 100 mg/dL Final    BUN 09/10/2016 25* 6 - 20 MG/DL Final    Creatinine 09/10/2016 1.07  0.70 - 1.30 MG/DL Final    BUN/Creatinine ratio 09/10/2016 23* 12 - 20   Final    GFR est AA 09/10/2016 >60  >60 ml/min/1.73m2 Final    GFR est non-AA 09/10/2016 >60  >60 ml/min/1.73m2 Final    Calcium 09/10/2016 8.5  8.5 - 10.1 MG/DL Final    Bilirubin, total 09/10/2016 0.9  0.2 - 1.0 MG/DL Final    ALT (SGPT) 09/10/2016 20  12 - 78 U/L Final    AST (SGOT) 09/10/2016 12* 15 - 37 U/L Final    Alk.  phosphatase 09/10/2016 30* 45 - 117 U/L Final    Protein, total 09/10/2016 5.9* 6.4 - 8.2 g/dL Final    Albumin 09/10/2016 3.4* 3.5 - 5.0 g/dL Final    Globulin 09/10/2016 2.5  2.0 - 4.0 g/dL Final    A-G Ratio 09/10/2016 1.4  1.1 - 2.2   Final    WBC 09/10/2016 10.7  4.1 - 11.1 K/uL Final    RBC 09/10/2016 4.16  4.10 - 5.70 M/uL Final    HGB 09/10/2016 12.1  12.1 - 17.0 g/dL Final    HCT 09/10/2016 37.2  36.6 - 50.3 % Final    MCV 09/10/2016 89.4  80.0 - 99.0 FL Final    MCH 09/10/2016 29.1  26.0 - 34.0 PG Final    MCHC 09/10/2016 32.5  30.0 - 36.5 g/dL Final    RDW 09/10/2016 14.9* 11.5 - 14.5 % Final    PLATELET 8436 109  150 - 400 K/uL Final    NEUTROPHILS 09/10/2016 70  32 - 75 % Final    LYMPHOCYTES 09/10/2016 19  12 - 49 % Final    MONOCYTES 09/10/2016 10  5 - 13 % Final    EOSINOPHILS 09/10/2016 1  0 - 7 % Final    BASOPHILS 09/10/2016 0  0 - 1 % Final    ABS. NEUTROPHILS 09/10/2016 7.5  1.8 - 8.0 K/UL Final    ABS. LYMPHOCYTES 09/10/2016 2.0  0.8 - 3.5 K/UL Final    ABS. MONOCYTES 09/10/2016 1.1* 0.0 - 1.0 K/UL Final    ABS. EOSINOPHILS 09/10/2016 0.1  0.0 - 0.4 K/UL Final    ABS. BASOPHILS 09/10/2016 0.0  0.0 - 0.1 K/UL Final    Magnesium 09/10/2016 2.1  1.6 - 2.4 mg/dL Final    Phosphorus 09/10/2016 3.5  2.6 - 4.7 MG/DL Final    TSH 09/10/2016 2.29  0.36 - 3.74 uIU/mL Final    Comment: (NOTE)  Due to TSH heterogeneity, both structurally and degree of   glycosylation, monoclonal antibodies used in the TSH assay may not   accurately quantitate TSH. Therefore, this result should be   correlated with clinical findings as well as with other assessments   of thyroid function, e.g., free T4, free T3.  Lyme Disease Ab, IgM, QT 09/10/2016 <0.80  0.00 - 0.79 index Final    Comment: (NOTE)                                 Negative         <0.80                                 Equivocal  0.80 - 1.19                                 Positive         >1.19  IgM levels may peak at 3-6 weeks post infection, then  gradually decline. Performed At: [address] [id number]  Brianna Turk MD F           Objective:       Exam:  Visit Vitals    /65    Pulse 84    SpO2 95%     Gen: Awake, alert, follows commands  Appropriate appearance, normal speech. Oriented to all spheres.   No visual field defect on confrontation exam.  Full eyes movement, with no nystagmus, no diplopia, no ptosis. Normal gag and swallow. All remaining cranial nerves were normal  Motor function: 5/5 in all extremities  Sensory: intact to LT, PP and JPS  Good FTN and HTS   Gait: Normal    Assessment:       ICD-10-CM ICD-9-CM    1. 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 Increased Mestinon 60 mg 6 times a day   2. Continue Prednisone 10 mg daily   3. Reminded about GI and bone prophylaxis    Follow-up Disposition:  Return in about 2 months (around 10/18/2017).           Sumeet Coronel MD  Diplomate, American Board of Psychiatry and Neurology  Diplomate, Neuromuscular Medicine  Diplomate, American Board of Electrodiagnostic Medicine

## 2017-08-18 NOTE — PATIENT INSTRUCTIONS
[facility]   Statement to Patients  April 1, 2014      In an effort to ensure the large volume of patient prescription refills is processed in the most efficient and expeditious manner, we are asking our patients to assist us by calling your Pharmacy for all prescription refills, this will include also your  Mail Order Pharmacy. The pharmacy will contact our office electronically to continue the refill process. Please do not wait until the last minute to call your pharmacy. We need at least 48 hours (2days) to fill prescriptions. We also encourage you to call your pharmacy before going to  your prescription to make sure it is ready. With regard to controlled substance prescription refill requests (narcotic refills) that need to be picked up at our office, we ask your cooperation by providing us with at least 72 hours (3days) notice that you will need a refill. We will not refill narcotic prescription refill requests after 4:00pm on any weekday, Monday through Thursday, or after 2:00pm on Fridays, or on the weekends. We encourage everyone to explore another way of getting your prescription refill request processed using [employer], our patient web portal through our electronic medical record system. [employer] is an efficient and effective way to communicate your medication request directly to the office and  downloadable as an sparkle on your smart phone . [employer] also features a review functionality that allows you to view your medication list as well as leave messages for your physician. Are you ready to get connected? If so please review the attatched instructions or speak to any of our staff to get you set up right away! Thank you so much for your cooperation. Should you have any questions please contact our Practice Administrator.     The Physicians and Staff,  [facility]

## 2017-10-20 ENCOUNTER — OFFICE VISIT (OUTPATIENT)
Dept: NEUROLOGY | Age: 62
End: 2017-10-20

## 2017-10-20 VITALS
DIASTOLIC BLOOD PRESSURE: 60 MMHG | OXYGEN SATURATION: 98 % | BODY MASS INDEX: 32.78 KG/M2 | HEART RATE: 87 BPM | SYSTOLIC BLOOD PRESSURE: 110 MMHG | WEIGHT: 235 LBS

## 2017-10-20 DIAGNOSIS — G70.00 OCULAR MYASTHENIA GRAVIS (HCC): ICD-10-CM

## 2017-10-20 RX ORDER — PYRIDOSTIGMINE BROMIDE 60 MG/1
60 TABLET ORAL
Qty: 630 TAB | Refills: 3 | Status: SHIPPED | OUTPATIENT
Start: 2017-10-20 | End: 2018-06-01 | Stop reason: SDUPTHER

## 2017-10-20 RX ORDER — PREDNISONE 10 MG/1
40 TABLET ORAL DAILY
Qty: 360 TAB | Refills: 3 | Status: SHIPPED | OUTPATIENT
Start: 2017-10-20 | End: 2018-08-09 | Stop reason: SDUPTHER

## 2017-10-20 NOTE — MR AVS SNAPSHOT
Visit Information Date & Time Provider Department Dept. Phone Encounter #  
 10/20/2017 10:40 AM Winter Martins MD [facility] [phone] [id number] Follow-up Instructions Return in about 6 months (around 4/20/2018). Upcoming Health Maintenance Date Due Hepatitis C Screening 1955 Pneumococcal 19-64 Medium Risk (1 of 1 - PPSV23) 1/15/1972 DTaP/Tdap/Td series (1 - Tdap) 1/15/1974 FOBT Q 1 YEAR AGE 50-75 1/15/2003 ZOSTER VACCINE AGE 60> 11/15/2012 INFLUENZA AGE 9 TO ADULT 8/1/2017 Allergies as of 10/20/2017  Review Complete On: 8/18/2017 By: Tima Ross No Known Allergies Current Immunizations  Never Reviewed No immunizations on file. Not reviewed this visit You Were Diagnosed With   
  
 Codes Comments Ocular myasthenia gravis ([facility]     ICD-10-CM: G70.00 ICD-9-CM: 358.00 Vitals BP Pulse Weight(growth percentile) SpO2 BMI Smoking Status 110/60 87 235 lb (106.6 kg) 98% 32.78 kg/m2 Former Smoker BMI and BSA Data Body Mass Index Body Surface Area 32.78 kg/m 2 2.31 m 2 Preferred Pharmacy Pharmacy Name Phone [address] [place] [address], [address] [place] [place] [address] [phone] Your Updated Medication List  
  
   
This list is accurate as of: 10/20/17 11:40 AM.  Always use your most recent med list.  
  
  
  
  
 Earnest Sovereign 250-50 mcg/dose diskus inhaler Generic drug:  fluticasone-salmeterol Take 1 Puff by inhalation every twelve (12) hours. aspirin delayed-release 81 mg tablet Take  by mouth daily. CADUET 10-20 mg per tablet Generic drug:  amLODIPine-atorvastatin Take 1 Tab by mouth nightly. fenofibric acid 135 mg capsule Commonly known as:  TRILIPIX ER Take 135 mg by mouth nightly. finasteride 5 mg tablet Commonly known as:  PROSCAR Take 5 mg by mouth daily. FLOMAX 0.4 mg capsule Generic drug:  tamsulosin Take 0.4 mg by mouth nightly. FLONASE 50 mcg/actuation nasal spray Generic drug:  fluticasone 2 Sprays by Both Nostrils route daily as needed. gabapentin 600 mg tablet Commonly known as:  NEURONTIN Take  by mouth two (2) times a day. irbesartan 300 mg tablet Commonly known as:  AVAPRO Take 300 mg by mouth nightly. KLOR-CON 10 10 mEq tablet Generic drug:  potassium chloride SR Take 10 mEq by mouth two (2) times a day. LASIX 20 mg tablet Generic drug:  furosemide Take 20 mg by mouth daily. levocetirizine 5 mg tablet Commonly known as:  Lenard [place] Take 5 mg by mouth daily. NIACIN FLUSH FREE 400 mg niacin (500 mg) Cap Generic drug:  niacin-inositol Take 1 Cap by mouth nightly. predniSONE 10 mg tablet Commonly known as:  Michelle Mons Take 4 Tabs by mouth daily. PROVENTIL HFA 90 mcg/actuation inhaler Generic drug:  albuterol Take 2 Puffs by inhalation every four (4) hours as needed. pyridostigmine 60 mg tablet Commonly known as:  MESTINON Take 1 Tab by mouth Before meals, after meals and at bedtime. SINGULAIR 10 mg tablet Generic drug:  montelukast  
Take 10 mg by mouth daily. VITAMIN D3 1,000 unit Cap Generic drug:  cholecalciferol Take 1,000 Units by mouth daily. Prescriptions Sent to Pharmacy Refills  
 pyridostigmine (MESTINON) 60 mg tablet 3 Sig: Take 1 Tab by mouth Before meals, after meals and at bedtime. Class: Normal  
 [facility] [address] [place] [address]  Ph #: [phone] Route: Oral  
 predniSONE (DELTASONE) 10 mg tablet 3 Sig: Take 4 Tabs by mouth daily. Class: Normal  
 Pharmacy: [address] [place] [address], [facility] Ph #: [phone] Route: Oral  
  
Follow-up Instructions Return in about 6 months (around 4/20/2018). Introducing [facility] SERVICES! Dear Napoleon Gain: Thank you for requesting a [employer] account. Our records indicate that you already have an active [employer] account. You can access your account anytime at [email or web address] Did you know that you can access your hospital and ER discharge instructions at any time in [employer]? You can also review all of your test results from your hospital stay or ER visit. Additional Information If you have questions, please visit the Frequently Asked Questions section of the [employer] website at [email or web address] Remember, [employer] is NOT to be used for urgent needs. For medical emergencies, dial 911. Now available from your iPhone and Android! Please provide this summary of care documentation to your next provider. Your primary care clinician is listed as Itzel Dorantes. If you have any questions after today's visit, please call [phone].

## 2017-10-20 NOTE — LETTER
10/20/2017 11:44 AM 
 
Patient:  Tayler Crum YOB: 1955 Date of Visit: 10/20/2017 Dear MD Declan Alvarenga [facility] [address] [id number] VIA Facsimile: [phone] 
 : Thank you for referring Mr. Ira Vieyra to me for evaluation/treatment. Below are the relevant portions of my assessment and plan of care. If you have questions, please do not hesitate to call me. I look forward to following Mr. Dipesh Dang along with you. Sincerely, Mikey Strickland MD

## 2017-10-20 NOTE — PROGRESS NOTES
Neurology Progress Note    Patient ID:  Kristi Crump  [id number]  9 y.o.  1955      Subjective:   History:  Mr. Kristi Crump is a 57 y.o. Male with Asthma; H/O seasonal allergies; Hyperlipemia; and Hypertension who was admitted at [address] [place] [address] for [address] started last 8/6/16, noticeable on R gaze, associated with R eye discomfort. Patient (+) AChR Abs. CT chest negative for thymoma.      Since the last time, patient allergy to ragweed had kicked in with increased blurred vision and had to increase his Prednisone to 60 mg. Patient now back to baseline and went down on Prednisone 10 mg every day starting today. Takes Mestinon 60 mg 7 times/ day with no side effects. However, patient noted numbness of both feet for several weeks.         ROS:  Per HPI-  Otherwise the remainder of ROS was negative    Social Hx  Social History     Social History    Marital status:      Spouse name: N/A    Number of children: N/A    Years of education: N/A     Social History Main Topics    Smoking status: Former Smoker    Smokeless tobacco: Never Used    Alcohol use No    Drug use: Not on file    Sexual activity: Not on file     Other Topics Concern    Not on file     Social History Narrative       Meds:  Current Outpatient Prescriptions on File Prior to Visit   Medication Sig Dispense Refill    finasteride (PROSCAR) 5 mg tablet Take 5 mg by mouth daily. 3    gabapentin (NEURONTIN) 600 mg tablet Take  by mouth two (2) times a day.  tamsulosin (FLOMAX) 0.4 mg capsule Take 0.4 mg by mouth nightly.  furosemide (LASIX) 20 mg tablet Take 20 mg by mouth daily.  fluticasone (FLONASE) 50 mcg/actuation nasal spray 2 Sprays by Both Nostrils route daily as needed.  montelukast (SINGULAIR) 10 mg tablet Take 10 mg by mouth daily.  levocetirizine (XYZAL) 5 mg tablet Take 5 mg by mouth daily.       albuterol (PROVENTIL HFA) 90 mcg/actuation inhaler Take 2 Puffs by inhalation every four (4) hours as needed.  aspirin delayed-release 81 mg tablet Take  by mouth daily.  cholecalciferol (VITAMIN D3) 1,000 unit cap Take 1,000 Units by mouth daily.  amLODIPine-atorvastatin (CADUET) 10-20 mg per tablet Take 1 Tab by mouth nightly.  irbesartan (AVAPRO) 300 mg tablet Take 300 mg by mouth nightly.  fenofibric acid (TRILIPIX ER) 135 mg capsule Take 135 mg by mouth nightly.  niacin-inositol (NIACIN FLUSH FREE) 400 mg niacin (500 mg) cap Take 1 Cap by mouth nightly.  potassium chloride SR (KLOR-CON 10) 10 mEq tablet Take 10 mEq by mouth two (2) times a day.  fluticasone-salmeterol (ADVAIR DISKUS) 250-50 mcg/dose diskus inhaler Take 1 Puff by inhalation every twelve (12) hours. No current facility-administered medications on file prior to visit. Imaging:    CT Results (recent):    Results from Hospital Encounter encounter on 10/11/16   CT CHEST W CONT   Narrative INDICATION: Myasthenia gravis. Evaluate for thymoma. COMPARISON: December 18, 2009 abdomen CT    TECHNIQUE:  Following the uneventful intravenous administration of 100 cc  Isovue-300, 5 mm axial images were obtained through the chest. CT dose reduction  was achieved through use of a standardized protocol tailored for this  examination and automatic exposure control for dose modulation. FINDINGS:    THYROID: No nodule. MEDIASTINUM: No mass or lymphadenopathy. No residual thymic tissue is  identified. JANESSA: No mass or lymphadenopathy. THORACIC AORTA: No dissection or aneurysm. MAIN PULMONARY ARTERY: Normal in caliber. TRACHEA/BRONCHI: Patent. ESOPHAGUS: No wall thickening or dilatation. HEART: Normal in size. Tiny pericardial effusion. Coronary artery calcifications  are present. PLEURA: No effusion or pneumothorax. LUNGS: 3 mm left upper lobe pulmonary nodule (series 2, image 35). Otherwise  clear. INCIDENTALLY IMAGED UPPER ABDOMEN: No focal abnormality.   BONES: No destructive bone lesion. Impression IMPRESSION:  No evidence of thymoma. Indeterminate 3 mm left upper lobe pulmonary nodule;  please see follow-up recommendations below. RECOMMENDATIONS FOR FOLLOW-UP AND MANAGEMENT OF NODULES SMALLER THAN 8 MM  DETECTED INCIDENTALLY AT NONSCREENING CT:    LOW-RISK PATIENTS:    LESS THAN OR EQUAL TO 4 MM:  No follow-up needed. GREATER THAN 4-6 MM:  Follow-up CT at 12 mo; if unchanged, no further follow-up. GREATER THAN 6-8 MM:  Initial follow-up CT at 6-12 months then at 18-24 months  if no change. GREATER THAN 8 MM:  Follow-up CT at around 3, 9, and 24 months, dynamic  contrast-enhanced CT, PET, and/or biopsy. HIGH-RISK PATIENTS:    LESS THAN OR EQUAL TO 4 MM:  Follow-up CT at 12 months; if unchanged, no further  follow-up. GREATER THAN 4-6 MM:  Initial follow-up CT at 6-12 months then at 18-24 months  if no change. GREATER THAN 6-8 MM:  Initial follow-up CT at 3-6 months then at 9-12 and 24  months if no change. GREATER THAN 8 MM:  Same as for low-risk patient. MRI Results (recent):    Results from [place] encounter on 09/09/16   MRI BRAIN WO CONT   Addendum Addendum: INDICATION: TIA/CVA. Double vision. EXAM: MRA of the intracranial vasculature was performed. FINDINGS:  The intracranial internal carotid arteries demonstrate normal signal. The  middle and anterior cerebral vessels demonstrate normal signal without major  occlusive change or obvious aneurysm or vascular malformation. The vertebrobasilar system is patent. The posterior cerebral arteries demonstrate normal signal.  IMPRESSION:  No definite major occlusive changes involving the intracranial  vasculature. No definite aneurysm or vascular malformation. Alexys Lan MD 9/10/2016  1:29 AM             Narrative *PRELIMINARY REPORT*    No evidence for acute infarction or acute intracranial abnormality.  Mild white  matter disease is nonspecific but may represent changes of chronic small vessel  ischemic disease. MRA of the brain without evidence for acute large vessel arterial occlusion or  significant stenosis. No evidence for aneurysm    Preliminary report was provided by Dr. Juvenal Vila, the on-call radiologist, at  10:27 PM 9/9/2016    Final report to follow. *END PRELIMINARY REPORT*    INDICATION: TIA/CVA. Double vision. EXAM: Sagittal and axial and coronal images were obtained through the brain in  varying sequences. T1-weighted, T2-weighted, FLAIR, GRE, Diffusion-weighted  sequences may be utilized. FINDINGS:    Comparison study: None are available. Sagittal images demonstrate moderate prominence of the basilar cisterns,  cortical sulci, and ventricles. Signal void is present in the cavernous carotid  arteries bilaterally. Axial and coronal images demonstrate no confluent infarct or hemorrhage or mass  effect. Mild multifocal hyperintensities are present in the periventricular deep  white matter and centrum semiovale likely microvascular changes related to  aging. Diffusion-weighted images demonstrate no acute infarct. Impression IMPRESSION:    No acute infarct, hemorrhage, or mass effect. IR Results (recent):  No results found for this or any previous visit. VAS/US Results (recent):  No results found for this or any previous visit. Reviewed records in [employer] and [employer] tab today    Lab Review     No visits with results within 3 Month(s) from this visit.   Latest known visit with results is:    Admission on 09/09/2016, Discharged on 09/10/2016   Component Date Value Ref Range Status    Ventricular Rate 09/09/2016 78  BPM Final    Atrial Rate 09/09/2016 78  BPM Final    P-R Interval 09/09/2016 186  ms Final    QRS Duration 09/09/2016 90  ms Final    Q-T Interval 09/09/2016 356  ms Final    QTC Calculation (Bezet) 09/09/2016 405  ms Final    Calculated P Axis 09/09/2016 56  degrees Final    Calculated R Axis 09/09/2016 8  degrees Final    Calculated T Axis 09/09/2016 41  degrees Final    Diagnosis 09/09/2016    Final                    Value:Normal sinus rhythm  Normal ECG  No previous ECGs available  Confirmed by Anthony Vivas MD ([id number]) on 9/10/2016 11:38:24 AM      WBC 09/09/2016 6.8  4.1 - 11.1 K/uL Final    RBC 09/09/2016 4.28  4.10 - 5.70 M/uL Final    HGB 09/09/2016 12.4  12.1 - 17.0 g/dL Final    HCT 09/09/2016 38.4  36.6 - 50.3 % Final    MCV 09/09/2016 89.7  80.0 - 99.0 FL Final    MCH 09/09/2016 29.0  26.0 - 34.0 PG Final    MCHC 09/09/2016 32.3  30.0 - 36.5 g/dL Final    RDW 09/09/2016 15.0* 11.5 - 14.5 % Final    PLATELET 88/91/8603 121  150 - 400 K/uL Final    NEUTROPHILS 09/09/2016 58  32 - 75 % Final    LYMPHOCYTES 09/09/2016 31  12 - 49 % Final    MONOCYTES 09/09/2016 8  5 - 13 % Final    EOSINOPHILS 09/09/2016 2  0 - 7 % Final    BASOPHILS 09/09/2016 1  0 - 1 % Final    ABS. NEUTROPHILS 09/09/2016 4.0  1.8 - 8.0 K/UL Final    ABS. LYMPHOCYTES 09/09/2016 2.1  0.8 - 3.5 K/UL Final    ABS. MONOCYTES 09/09/2016 0.6  0.0 - 1.0 K/UL Final    ABS. EOSINOPHILS 09/09/2016 0.1  0.0 - 0.4 K/UL Final    ABS.  BASOPHILS 09/09/2016 0.1  0.0 - 0.1 K/UL Final    Sodium 09/09/2016 142  136 - 145 mmol/L Final    Potassium 09/09/2016 4.1  3.5 - 5.1 mmol/L Final    Chloride 09/09/2016 107  97 - 108 mmol/L Final    CO2 09/09/2016 29  21 - 32 mmol/L Final    Anion gap 09/09/2016 6  5 - 15 mmol/L Final    Glucose 09/09/2016 91  65 - 100 mg/dL Final    BUN 09/09/2016 31* 6 - 20 MG/DL Final    Creatinine 09/09/2016 1.27  0.70 - 1.30 MG/DL Final    BUN/Creatinine ratio 09/09/2016 24* 12 - 20   Final    GFR est AA 09/09/2016 >60  >60 ml/min/1.73m2 Final    GFR est non-AA 09/09/2016 57* >60 ml/min/1.73m2 Final    Comment: Estimated GFR is calculated using the IDMS-traceable Modification of Diet in Renal Disease (MDRD) Study equation, reported for both  Americans (GFRAA) and non- Americans (GFRNA), and normalized to 1.73m2 body surface area. The physician must decide which value applies to the patient. The MDRD study equation should only be used in individuals age 25 or older. It has not been validated for the following: pregnant women, patients with serious comorbid conditions, or on certain medications, or persons with extremes of body size, muscle mass, or nutritional status.  Calcium 09/09/2016 8.9  8.5 - 10.1 MG/DL Final    Bilirubin, total 09/09/2016 0.3  0.2 - 1.0 MG/DL Final    ALT (SGPT) 09/09/2016 25  12 - 78 U/L Final    AST (SGOT) 09/09/2016 11* 15 - 37 U/L Final    Alk. phosphatase 09/09/2016 38* 45 - 117 U/L Final    Protein, total 09/09/2016 6.9  6.4 - 8.2 g/dL Final    Albumin 09/09/2016 3.9  3.5 - 5.0 g/dL Final    Globulin 09/09/2016 3.0  2.0 - 4.0 g/dL Final    A-G Ratio 09/09/2016 1.3  1.1 - 2.2   Final    Troponin-I, Qt. 09/09/2016 <0.04  <0.05 ng/mL Final    Comment: The presence of detectable troponin above the reference range indicates myocardial injury which may be due to ischemia, myocarditis, trauma, etc.  Clinical correlation is necessary to establish the significance of this finding. Sequential testing is recommended to determine if the typical rise and fall of cTnI is demonstrated. Note:  Cardiac troponin I has a relatively long half life and may be present well after the CK MB has returned to baseline. The reference range is based on the 99th percentile of the referent population.       Color 09/09/2016 YELLOW/STRAW    Final    Color Reference Range: Straw, Yellow or Dark Yellow    Appearance 09/09/2016 CLEAR  CLEAR   Final    Specific gravity 09/09/2016 1.025  1.003 - 1.030   Final    pH (UA) 09/09/2016 6.0  5.0 - 8.0   Final    Protein 09/09/2016 NEGATIVE   NEG mg/dL Final    Glucose 09/09/2016 NEGATIVE   NEG mg/dL Final    Ketone 09/09/2016 NEGATIVE   NEG mg/dL Final    Bilirubin 09/09/2016 NEGATIVE   NEG   Final    Blood 09/09/2016 NEGATIVE   NEG   Final    Urobilinogen 09/09/2016 0.2  0.2 - 1.0 EU/dL Final    Nitrites 09/09/2016 NEGATIVE   NEG   Final    Leukocyte Esterase 09/09/2016 NEGATIVE   NEG   Final    UA:UC IF INDICATED 09/09/2016 CULTURE NOT INDICATED BY UA RESULT  CNI   Final    WBC 09/09/2016 0-4  0 - 4 /hpf Final    RBC 09/09/2016 0-5  0 - 5 /hpf Final    Epithelial cells 09/09/2016 FEW  FEW /lpf Final    Epithelial cell category consists of squamous cells and /or transitional urothelial cells. Renal tubular cells, if present, are separately identified as such.  Bacteria 09/09/2016 NEGATIVE   NEG /hpf Final    Hyaline cast 09/09/2016 0-2  0 - 5 /lpf Final    AChR Binding Ab, serum 09/09/2016 0.55* 0.00 - 0.24 nmol/L Final    Comment: (NOTE)                                Negative:   0.00 - 0.24                                Borderline: 0.25 - 0.40                                Positive:        > 0.40  Performed At: [facility]  [email or web address], [address] [place] [address] [id number]  Rivka Gomez MD ELDRIDGE:[id number]      AChR Blocking Ab 09/09/2016 30* 0 - 25 % Final    Comment: (NOTE)                                Negative:      0 - 25                                Borderline:   26 - 30                                Positive:         >30  Results for this test are for research purposes  only by the assay's . The performance  characteristics of this product have not been  established. Results should not be used as a  diagnostic procedure without confirmation of the  diagnosis by another medically established  diagnostic product or procedure.   Performed At: [facility]  [email or web address] [address] [place] [address] [id number]  Rvika Gomez MD PZ:[id number]      AChR Modulating Ab 09/09/2016 23* 0 - 20 % Final    Comment: (NOTE)                               Negative:          <21                               Equivocal:     21 - 25                               Positive:          >25  The assay is linear between values of 12 and 64. Those <12 and >64 are reported as such. No single  value for ACR-modulating antibody should be used as  a sole basis for diagnosis or response to therapy. Performed At: [address] [id number]  Abdi Aceves MD RD:[id number]      Hemoglobin A1c 09/09/2016 5.6  4.2 - 6.3 % Final    Est. average glucose 09/09/2016 114  mg/dL Final    Comment: (NOTE)  The eAG should be interpreted with patient characteristics in mind   since ethnicity, interindividual differences, red cell lifespan,   variation in rates of glycation, etc. may affect the validity of the   calculation.  Sed rate, automated 09/09/2016 7  0 - 20 mm/hr Final    Sodium 09/10/2016 141  136 - 145 mmol/L Final    Potassium 09/10/2016 3.7  3.5 - 5.1 mmol/L Final    Chloride 09/10/2016 108  97 - 108 mmol/L Final    CO2 09/10/2016 25  21 - 32 mmol/L Final    Anion gap 09/10/2016 8  5 - 15 mmol/L Final    Glucose 09/10/2016 90  65 - 100 mg/dL Final    BUN 09/10/2016 25* 6 - 20 MG/DL Final    Creatinine 09/10/2016 1.07  0.70 - 1.30 MG/DL Final    BUN/Creatinine ratio 09/10/2016 23* 12 - 20   Final    GFR est AA 09/10/2016 >60  >60 ml/min/1.73m2 Final    GFR est non-AA 09/10/2016 >60  >60 ml/min/1.73m2 Final    Calcium 09/10/2016 8.5  8.5 - 10.1 MG/DL Final    Bilirubin, total 09/10/2016 0.9  0.2 - 1.0 MG/DL Final    ALT (SGPT) 09/10/2016 20  12 - 78 U/L Final    AST (SGOT) 09/10/2016 12* 15 - 37 U/L Final    Alk.  phosphatase 09/10/2016 30* 45 - 117 U/L Final    Protein, total 09/10/2016 5.9* 6.4 - 8.2 g/dL Final    Albumin 09/10/2016 3.4* 3.5 - 5.0 g/dL Final    Globulin 09/10/2016 2.5  2.0 - 4.0 g/dL Final    A-G Ratio 09/10/2016 1.4  1.1 - 2.2   Final    WBC 09/10/2016 10.7  4.1 - 11.1 K/uL Final    RBC 09/10/2016 4.16  4.10 - 5.70 M/uL Final    HGB 09/10/2016 12.1  12.1 - 17.0 g/dL Final    HCT 09/10/2016 37.2  36.6 - 50.3 % Final    MCV 09/10/2016 89.4  80.0 - 99.0 FL Final    MCH 09/10/2016 29.1  26.0 - 34.0 PG Final    MCHC 09/10/2016 32.5  30.0 - 36.5 g/dL Final    RDW 09/10/2016 14.9* 11.5 - 14.5 % Final    PLATELET  507  150 - 400 K/uL Final    NEUTROPHILS 09/10/2016 70  32 - 75 % Final    LYMPHOCYTES 09/10/2016 19  12 - 49 % Final    MONOCYTES 09/10/2016 10  5 - 13 % Final    EOSINOPHILS 09/10/2016 1  0 - 7 % Final    BASOPHILS 09/10/2016 0  0 - 1 % Final    ABS. NEUTROPHILS 09/10/2016 7.5  1.8 - 8.0 K/UL Final    ABS. LYMPHOCYTES 09/10/2016 2.0  0.8 - 3.5 K/UL Final    ABS. MONOCYTES 09/10/2016 1.1* 0.0 - 1.0 K/UL Final    ABS. EOSINOPHILS 09/10/2016 0.1  0.0 - 0.4 K/UL Final    ABS. BASOPHILS 09/10/2016 0.0  0.0 - 0.1 K/UL Final    Magnesium 09/10/2016 2.1  1.6 - 2.4 mg/dL Final    Phosphorus 09/10/2016 3.5  2.6 - 4.7 MG/DL Final    TSH 09/10/2016 2.29  0.36 - 3.74 uIU/mL Final    Comment: (NOTE)  Due to TSH heterogeneity, both structurally and degree of   glycosylation, monoclonal antibodies used in the TSH assay may not   accurately quantitate TSH. Therefore, this result should be   correlated with clinical findings as well as with other assessments   of thyroid function, e.g., free T4, free T3.  Lyme Disease Ab, IgM, QT 09/10/2016 <0.80  0.00 - 0.79 index Final    Comment: (NOTE)                                 Negative         <0.80                                 Equivocal  0.80 - 1.19                                 Positive         >1.19  IgM levels may peak at 3-6 weeks post infection, then  gradually decline. Performed At: [address] [id number]  Ta Dykes MD T           Objective:       Exam:  Visit Vitals    /60    Pulse 87    Wt 106.6 kg (235 lb)    SpO2 98%    BMI 32.78 kg/m2     Gen: Awake, alert, follows commands  Appropriate appearance, normal speech. Oriented to all spheres.   No visual field defect on confrontation exam.  Full eyes movement, with no nystagmus, no diplopia, no ptosis. Normal gag and swallow. All remaining cranial nerves were normal  Motor function: 5/5 in all extremities  Sensory: intact to LT, PP and JPS  DTRs ++ in all extremities, (-) Babinski  Good FTN and HTS   Gait: Normal    Assessment:       ICD-10-CM ICD-9-CM    1. 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 Increased Mestinon 60 mg 7 times a day   2. Continue Prednisone 10 mg daily (increase as needed during allergy season). Advise to monitor BS and to discuss with PCP if BS remains high  3. Reminded about GI and bone prophylaxis  4. Discussed alternative to treatment (IVIG and thymectomy) but patient declined for now and wants to see ho he does in [facility] during allergy season      Follow-up Disposition:  Return in about 6 months (around 4/20/2018).           Carla Godwin MD  Diplomate, American Board of Psychiatry and Neurology  Diplomate, Neuromuscular Medicine  Diplomate, American Board of Electrodiagnostic Medicine

## 2018-04-13 ENCOUNTER — OFFICE VISIT (OUTPATIENT)
Dept: NEUROLOGY | Age: 63
End: 2018-04-13

## 2018-04-13 VITALS
HEART RATE: 86 BPM | SYSTOLIC BLOOD PRESSURE: 116 MMHG | WEIGHT: 232 LBS | DIASTOLIC BLOOD PRESSURE: 66 MMHG | OXYGEN SATURATION: 94 % | BODY MASS INDEX: 32.36 KG/M2

## 2018-04-13 DIAGNOSIS — G70.00 OCULAR MYASTHENIA GRAVIS (HCC): Primary | ICD-10-CM

## 2018-04-13 RX ORDER — AMITRIPTYLINE HYDROCHLORIDE 10 MG/1
10 TABLET, FILM COATED ORAL DAILY
Refills: 3 | COMMUNITY
Start: 2018-03-29 | End: 2020-10-15 | Stop reason: DRUGHIGH

## 2018-04-13 NOTE — PROGRESS NOTES
Neurology Progress Note    Patient ID:  Lulu Bryant  [id number]  54 y.o.  1955      Subjective:   History:  Mr. Lulu Bryant is a 57 y.o. Male with Asthma; H/O seasonal allergies; Hyperlipemia; and Hypertension who was admitted at [address] [place] [address] for [address] started last 8/6/16, noticeable on R gaze, associated with R eye discomfort. Patient (+) AChR Abs. CT chest negative for thymoma. His allergy to ragweed can cause flare ups described as increased blurred vision and has to adjust his Prednisone. Since the last time, due to Spring allergy, patient noted vision changes and had to increase his Prednisone to 30 mg with good effect but noted numbness in his feet. Takes Mestinon 60 mg 7 times/ day with no side effects. Feels like 20 mg of Prednisone his his \"sweet spot\".       ROS:  Per HPI-  Otherwise the remainder of ROS was negative    Social Hx  Social History     Social History    Marital status:      Spouse name: N/A    Number of children: N/A    Years of education: N/A     Social History Main Topics    Smoking status: Former Smoker    Smokeless tobacco: Never Used    Alcohol use No    Drug use: None    Sexual activity: Not Asked     Other Topics Concern    None     Social History Narrative       Meds:  Current Outpatient Prescriptions on File Prior to Visit   Medication Sig Dispense Refill    pyridostigmine (MESTINON) 60 mg tablet Take 1 Tab by mouth Before meals, after meals and at bedtime. 630 Tab 3    predniSONE (DELTASONE) 10 mg tablet Take 4 Tabs by mouth daily. 360 Tab 3    finasteride (PROSCAR) 5 mg tablet Take 5 mg by mouth daily. 3    gabapentin (NEURONTIN) 600 mg tablet Take  by mouth two (2) times a day.  furosemide (LASIX) 20 mg tablet Take 20 mg by mouth daily.  fluticasone (FLONASE) 50 mcg/actuation nasal spray 2 Sprays by Both Nostrils route daily as needed.  montelukast (SINGULAIR) 10 mg tablet Take 10 mg by mouth daily.       albuterol (PROVENTIL HFA) 90 mcg/actuation inhaler Take 2 Puffs by inhalation every four (4) hours as needed.  aspirin delayed-release 81 mg tablet Take  by mouth daily.  cholecalciferol (VITAMIN D3) 1,000 unit cap Take 1,000 Units by mouth daily.  amLODIPine-atorvastatin (CADUET) 10-20 mg per tablet Take 1 Tab by mouth nightly.  irbesartan (AVAPRO) 300 mg tablet Take 300 mg by mouth nightly.  fenofibric acid (TRILIPIX ER) 135 mg capsule Take 135 mg by mouth nightly.  niacin-inositol (NIACIN FLUSH FREE) 400 mg niacin (500 mg) cap Take 1 Cap by mouth nightly.  potassium chloride SR (KLOR-CON 10) 10 mEq tablet Take 10 mEq by mouth two (2) times a day.  fluticasone-salmeterol (ADVAIR DISKUS) 250-50 mcg/dose diskus inhaler Take 1 Puff by inhalation every twelve (12) hours.  tamsulosin (FLOMAX) 0.4 mg capsule Take 0.4 mg by mouth nightly.  levocetirizine (XYZAL) 5 mg tablet Take 5 mg by mouth daily. No current facility-administered medications on file prior to visit. Imaging:    CT Results (recent):    Results from Hospital Encounter encounter on 10/11/16   CT CHEST W CONT   Narrative INDICATION: Myasthenia gravis. Evaluate for thymoma. COMPARISON: December 18, 2009 abdomen CT    TECHNIQUE:  Following the uneventful intravenous administration of 100 cc  Isovue-300, 5 mm axial images were obtained through the chest. CT dose reduction  was achieved through use of a standardized protocol tailored for this  examination and automatic exposure control for dose modulation. FINDINGS:    THYROID: No nodule. MEDIASTINUM: No mass or lymphadenopathy. No residual thymic tissue is  identified. JANESSA: No mass or lymphadenopathy. THORACIC AORTA: No dissection or aneurysm. MAIN PULMONARY ARTERY: Normal in caliber. TRACHEA/BRONCHI: Patent. ESOPHAGUS: No wall thickening or dilatation. HEART: Normal in size. Tiny pericardial effusion.  Coronary artery calcifications  are present. PLEURA: No effusion or pneumothorax. LUNGS: 3 mm left upper lobe pulmonary nodule (series 2, image 35). Otherwise  clear. INCIDENTALLY IMAGED UPPER ABDOMEN: No focal abnormality. BONES: No destructive bone lesion. Impression IMPRESSION:  No evidence of thymoma. Indeterminate 3 mm left upper lobe pulmonary nodule;  please see follow-up recommendations below. RECOMMENDATIONS FOR FOLLOW-UP AND MANAGEMENT OF NODULES SMALLER THAN 8 MM  DETECTED INCIDENTALLY AT NONSCREENING CT:    LOW-RISK PATIENTS:    LESS THAN OR EQUAL TO 4 MM:  No follow-up needed. GREATER THAN 4-6 MM:  Follow-up CT at 12 mo; if unchanged, no further follow-up. GREATER THAN 6-8 MM:  Initial follow-up CT at 6-12 months then at 18-24 months  if no change. GREATER THAN 8 MM:  Follow-up CT at around 3, 9, and 24 months, dynamic  contrast-enhanced CT, PET, and/or biopsy. HIGH-RISK PATIENTS:    LESS THAN OR EQUAL TO 4 MM:  Follow-up CT at 12 months; if unchanged, no further  follow-up. GREATER THAN 4-6 MM:  Initial follow-up CT at 6-12 months then at 18-24 months  if no change. GREATER THAN 6-8 MM:  Initial follow-up CT at 3-6 months then at 9-12 and 24  months if no change. GREATER THAN 8 MM:  Same as for low-risk patient. MRI Results (recent):    Results from [place] encounter on 09/09/16   MRI BRAIN WO CONT   Addendum Addendum: INDICATION: TIA/CVA. Double vision. EXAM: MRA of the intracranial vasculature was performed. FINDINGS:  The intracranial internal carotid arteries demonstrate normal signal. The  middle and anterior cerebral vessels demonstrate normal signal without major  occlusive change or obvious aneurysm or vascular malformation. The vertebrobasilar system is patent. The posterior cerebral arteries demonstrate normal signal.  IMPRESSION:  No definite major occlusive changes involving the intracranial  vasculature. No definite aneurysm or vascular malformation. Antonieta Vera MD 9/10/2016  1:29 AM             Narrative *PRELIMINARY REPORT*    No evidence for acute infarction or acute intracranial abnormality. Mild white  matter disease is nonspecific but may represent changes of chronic small vessel  ischemic disease. MRA of the brain without evidence for acute large vessel arterial occlusion or  significant stenosis. No evidence for aneurysm    Preliminary report was provided by Dr. Jensen Sarmiento, the on-call radiologist, at  10:27 PM 9/9/2016    Final report to follow. *END PRELIMINARY REPORT*    INDICATION: TIA/CVA. Double vision. EXAM: Sagittal and axial and coronal images were obtained through the brain in  varying sequences. T1-weighted, T2-weighted, FLAIR, GRE, Diffusion-weighted  sequences may be utilized. FINDINGS:    Comparison study: None are available. Sagittal images demonstrate moderate prominence of the basilar cisterns,  cortical sulci, and ventricles. Signal void is present in the cavernous carotid  arteries bilaterally. Axial and coronal images demonstrate no confluent infarct or hemorrhage or mass  effect. Mild multifocal hyperintensities are present in the periventricular deep  white matter and centrum semiovale likely microvascular changes related to  aging. Diffusion-weighted images demonstrate no acute infarct. Impression IMPRESSION:    No acute infarct, hemorrhage, or mass effect. IR Results (recent):  No results found for this or any previous visit. VAS/US Results (recent):  No results found for this or any previous visit. Reviewed records in [facility] and media tab today    Lab Review     No visits with results within 3 Month(s) from this visit.   Latest known visit with results is:    Admission on 09/09/2016, Discharged on 09/10/2016   Component Date Value Ref Range Status    Ventricular Rate 09/09/2016 78  BPM Final    Atrial Rate 09/09/2016 78  BPM Final    P-R Interval 09/09/2016 186  ms Final    QRS Duration 09/09/2016 90  ms Final    Q-T Interval 09/09/2016 356  ms Final    QTC Calculation (Bezet) 09/09/2016 405  ms Final    Calculated P Axis 09/09/2016 56  degrees Final    Calculated R Axis 09/09/2016 8  degrees Final    Calculated T Axis 09/09/2016 41  degrees Final    Diagnosis 09/09/2016    Final                    Value:Normal sinus rhythm  Normal ECG  No previous ECGs available  Confirmed by Anthony Vivas MD ([id number]) on 9/10/2016 11:38:24 AM      WBC 09/09/2016 6.8  4.1 - 11.1 K/uL Final    RBC 09/09/2016 4.28  4.10 - 5.70 M/uL Final    HGB 09/09/2016 12.4  12.1 - 17.0 g/dL Final    HCT 09/09/2016 38.4  36.6 - 50.3 % Final    MCV 09/09/2016 89.7  80.0 - 99.0 FL Final    MCH 09/09/2016 29.0  26.0 - 34.0 PG Final    MCHC 09/09/2016 32.3  30.0 - 36.5 g/dL Final    RDW 09/09/2016 15.0* 11.5 - 14.5 % Final    PLATELET 73/38/4685 163  150 - 400 K/uL Final    NEUTROPHILS 09/09/2016 58  32 - 75 % Final    LYMPHOCYTES 09/09/2016 31  12 - 49 % Final    MONOCYTES 09/09/2016 8  5 - 13 % Final    EOSINOPHILS 09/09/2016 2  0 - 7 % Final    BASOPHILS 09/09/2016 1  0 - 1 % Final    ABS. NEUTROPHILS 09/09/2016 4.0  1.8 - 8.0 K/UL Final    ABS. LYMPHOCYTES 09/09/2016 2.1  0.8 - 3.5 K/UL Final    ABS. MONOCYTES 09/09/2016 0.6  0.0 - 1.0 K/UL Final    ABS. EOSINOPHILS 09/09/2016 0.1  0.0 - 0.4 K/UL Final    ABS.  BASOPHILS 09/09/2016 0.1  0.0 - 0.1 K/UL Final    Sodium 09/09/2016 142  136 - 145 mmol/L Final    Potassium 09/09/2016 4.1  3.5 - 5.1 mmol/L Final    Chloride 09/09/2016 107  97 - 108 mmol/L Final    CO2 09/09/2016 29  21 - 32 mmol/L Final    Anion gap 09/09/2016 6  5 - 15 mmol/L Final    Glucose 09/09/2016 91  65 - 100 mg/dL Final    BUN 09/09/2016 31* 6 - 20 MG/DL Final    Creatinine 09/09/2016 1.27  0.70 - 1.30 MG/DL Final    BUN/Creatinine ratio 09/09/2016 24* 12 - 20   Final    GFR est AA 09/09/2016 >60  >60 ml/min/1.73m2 Final    GFR est non-AA 09/09/2016 57* >60 ml/min/1.73m2 Final    Comment: Estimated GFR is calculated using the IDMS-traceable Modification of Diet in Renal Disease (MDRD) Study equation, reported for both  Americans (GFRAA) and non- Americans (GFRNA), and normalized to 1.73m2 body surface area. The physician must decide which value applies to the patient. The MDRD study equation should only be used in individuals age 25 or older. It has not been validated for the following: pregnant women, patients with serious comorbid conditions, or on certain medications, or persons with extremes of body size, muscle mass, or nutritional status.  Calcium 09/09/2016 8.9  8.5 - 10.1 MG/DL Final    Bilirubin, total 09/09/2016 0.3  0.2 - 1.0 MG/DL Final    ALT (SGPT) 09/09/2016 25  12 - 78 U/L Final    AST (SGOT) 09/09/2016 11* 15 - 37 U/L Final    Alk. phosphatase 09/09/2016 38* 45 - 117 U/L Final    Protein, total 09/09/2016 6.9  6.4 - 8.2 g/dL Final    Albumin 09/09/2016 3.9  3.5 - 5.0 g/dL Final    Globulin 09/09/2016 3.0  2.0 - 4.0 g/dL Final    A-G Ratio 09/09/2016 1.3  1.1 - 2.2   Final    Troponin-I, Qt. 09/09/2016 <0.04  <0.05 ng/mL Final    Comment: The presence of detectable troponin above the reference range indicates myocardial injury which may be due to ischemia, myocarditis, trauma, etc.  Clinical correlation is necessary to establish the significance of this finding. Sequential testing is recommended to determine if the typical rise and fall of cTnI is demonstrated. Note:  Cardiac troponin I has a relatively long half life and may be present well after the CK MB has returned to baseline. The reference range is based on the 99th percentile of the referent population.       Color 09/09/2016 YELLOW/STRAW    Final    Color Reference Range: Straw, Yellow or Dark Yellow    Appearance 09/09/2016 CLEAR  CLEAR   Final    Specific gravity 09/09/2016 1.025  1.003 - 1.030   Final    pH (UA) 09/09/2016 6.0  5.0 - 8.0   Final    Protein 09/09/2016 NEGATIVE   NEG mg/dL Final    Glucose 09/09/2016 NEGATIVE   NEG mg/dL Final    Ketone 09/09/2016 NEGATIVE   NEG mg/dL Final    Bilirubin 09/09/2016 NEGATIVE   NEG   Final    Blood 09/09/2016 NEGATIVE   NEG   Final    Urobilinogen 09/09/2016 0.2  0.2 - 1.0 EU/dL Final    Nitrites 09/09/2016 NEGATIVE   NEG   Final    Leukocyte Esterase 09/09/2016 NEGATIVE   NEG   Final    UA:UC IF INDICATED 09/09/2016 CULTURE NOT INDICATED BY UA RESULT  CNI   Final    WBC 09/09/2016 0-4  0 - 4 /hpf Final    RBC 09/09/2016 0-5  0 - 5 /hpf Final    Epithelial cells 09/09/2016 FEW  FEW /lpf Final    Epithelial cell category consists of squamous cells and /or transitional urothelial cells. Renal tubular cells, if present, are separately identified as such.  Bacteria 09/09/2016 NEGATIVE   NEG /hpf Final    Hyaline cast 09/09/2016 0-2  0 - 5 /lpf Final    AChR Binding Ab, serum 09/09/2016 0.55* 0.00 - 0.24 nmol/L Final    Comment: (NOTE)                                Negative:   0.00 - 0.24                                Borderline: 0.25 - 0.40                                Positive:        > 0.40  Performed At: [facility]  [facility] [address] [id number]  Sophie Morfin MD [id number]:[id number]      AChR Blocking Ab 09/09/2016 30* 0 - 25 % Final    Comment: (NOTE)                                Negative:      0 - 25                                Borderline:   26 - 30                                Positive:         >30  Results for this test are for research purposes  only by the assay's . The performance  characteristics of this product have not been  established. Results should not be used as a  diagnostic procedure without confirmation of the  diagnosis by another medically established  diagnostic product or procedure.   Performed At: [facility]  [facility] [address] [id number]  Sophie Morfin MD XS:[id number]      AChR Modulating Ab 09/09/2016 23* 0 - 20 % Final    Comment: (NOTE)                               Negative:          <21                               Equivocal:     21 - 25                               Positive:          >25  The assay is linear between values of 12 and 64. Those <12 and >64 are reported as such. No single  value for ACR-modulating antibody should be used as  a sole basis for diagnosis or response to therapy. Performed At: [address] [id number]  Shilpa Dale MD [id number]      Hemoglobin A1c 09/09/2016 5.6  4.2 - 6.3 % Final    Est. average glucose 09/09/2016 114  mg/dL Final    Comment: (NOTE)  The eAG should be interpreted with patient characteristics in mind   since ethnicity, interindividual differences, red cell lifespan,   variation in rates of glycation, etc. may affect the validity of the   calculation.  Sed rate, automated 09/09/2016 7  0 - 20 mm/hr Final    Sodium 09/10/2016 141  136 - 145 mmol/L Final    Potassium 09/10/2016 3.7  3.5 - 5.1 mmol/L Final    Chloride 09/10/2016 108  97 - 108 mmol/L Final    CO2 09/10/2016 25  21 - 32 mmol/L Final    Anion gap 09/10/2016 8  5 - 15 mmol/L Final    Glucose 09/10/2016 90  65 - 100 mg/dL Final    BUN 09/10/2016 25* 6 - 20 MG/DL Final    Creatinine 09/10/2016 1.07  0.70 - 1.30 MG/DL Final    BUN/Creatinine ratio 09/10/2016 23* 12 - 20   Final    GFR est AA 09/10/2016 >60  >60 ml/min/1.73m2 Final    GFR est non-AA 09/10/2016 >60  >60 ml/min/1.73m2 Final    Calcium 09/10/2016 8.5  8.5 - 10.1 MG/DL Final    Bilirubin, total 09/10/2016 0.9  0.2 - 1.0 MG/DL Final    ALT (SGPT) 09/10/2016 20  12 - 78 U/L Final    AST (SGOT) 09/10/2016 12* 15 - 37 U/L Final    Alk.  phosphatase 09/10/2016 30* 45 - 117 U/L Final    Protein, total 09/10/2016 5.9* 6.4 - 8.2 g/dL Final    Albumin 09/10/2016 3.4* 3.5 - 5.0 g/dL Final    Globulin 09/10/2016 2.5  2.0 - 4.0 g/dL Final    A-G Ratio 09/10/2016 1.4  1.1 - 2.2 Final    WBC 09/10/2016 10.7  4.1 - 11.1 K/uL Final    RBC 09/10/2016 4.16  4.10 - 5.70 M/uL Final    HGB 09/10/2016 12.1  12.1 - 17.0 g/dL Final    HCT 09/10/2016 37.2  36.6 - 50.3 % Final    MCV 09/10/2016 89.4  80.0 - 99.0 FL Final    MCH 09/10/2016 29.1  26.0 - 34.0 PG Final    MCHC 09/10/2016 32.5  30.0 - 36.5 g/dL Final    RDW 09/10/2016 14.9* 11.5 - 14.5 % Final    PLATELET 71/96/6297 054  150 - 400 K/uL Final    NEUTROPHILS 09/10/2016 70  32 - 75 % Final    LYMPHOCYTES 09/10/2016 19  12 - 49 % Final    MONOCYTES 09/10/2016 10  5 - 13 % Final    EOSINOPHILS 09/10/2016 1  0 - 7 % Final    BASOPHILS 09/10/2016 0  0 - 1 % Final    ABS. NEUTROPHILS 09/10/2016 7.5  1.8 - 8.0 K/UL Final    ABS. LYMPHOCYTES 09/10/2016 2.0  0.8 - 3.5 K/UL Final    ABS. MONOCYTES 09/10/2016 1.1* 0.0 - 1.0 K/UL Final    ABS. EOSINOPHILS 09/10/2016 0.1  0.0 - 0.4 K/UL Final    ABS. BASOPHILS 09/10/2016 0.0  0.0 - 0.1 K/UL Final    Magnesium 09/10/2016 2.1  1.6 - 2.4 mg/dL Final    Phosphorus 09/10/2016 3.5  2.6 - 4.7 MG/DL Final    TSH 09/10/2016 2.29  0.36 - 3.74 uIU/mL Final    Comment: (NOTE)  Due to TSH heterogeneity, both structurally and degree of   glycosylation, monoclonal antibodies used in the TSH assay may not   accurately quantitate TSH. Therefore, this result should be   correlated with clinical findings as well as with other assessments   of thyroid function, e.g., free T4, free T3.  Lyme Disease Ab, IgM, QT 09/10/2016 <0.80  0.00 - 0.79 index Final    Comment: (NOTE)                                 Negative         <0.80                                 Equivocal  0.80 - 1.19                                 Positive         >1.19  IgM levels may peak at 3-6 weeks post infection, then  gradually decline.   Performed At: [address] [id number]  Kathryn Magallanes MD JM:[id number]           Objective:       Exam:  Visit Vitals    /66    Pulse 86    Wt 105.2 kg (232 lb)    SpO2 94%    BMI 32.36 kg/m2     Gen: Awake, alert, follows commands  Appropriate appearance, normal speech. Oriented to all spheres. No visual field defect on confrontation exam.  Full eyes movement, with no nystagmus, no diplopia, no ptosis. Normal gag and swallow. All remaining cranial nerves were normal  Motor function: 5/5 in all extremities  Sensory: intact to LT, PP and JPS  Good FTN and HTS   Gait: Normal    Assessment:       ICD-10-CM ICD-9-CM    1. Ocular myasthenia gravis ([facility] G70.00 358.00            Plan:   1. Decrease Prednisone to 20 mg every day. Can adjust by 10 mg accordingly. Advise to monitor BS and to discuss with PCP if BS remains high  2. Continue Mestinon 60 mg 7 times a day   3. Reminded about GI and bone prophylaxis    Follow-up Disposition:  Return in about 6 months (around 10/13/2018).           Ele Lira MD  Diplomate, American Board of Psychiatry and Neurology  Diplomate, Neuromuscular Medicine  Diplomate, American Board of Electrodiagnostic Medicine

## 2018-04-13 NOTE — MR AVS SNAPSHOT
[address] [facility] [address] [facility] [id number] [phone] Patient: Samira Varma MRN: [id number] KA Visit Information Date & Time Provider Department Dept. Phone Encounter #  
 2018  8:40 AM Rona Kebede MD [facility] [phone] [id number] Follow-up Instructions Return in about 6 months (around 10/13/2018). Upcoming Health Maintenance Date Due Hepatitis C Screening 1955 DTaP/Tdap/Td series (1 - Tdap) 1/15/1974 FOBT Q 1 YEAR AGE 50-75 1/15/2003 ZOSTER VACCINE AGE 60> 11/15/2012 Influenza Age 5 to Adult 2017 GLAUCOMA SCREENING Q2Y 1/15/2018 Pneumococcal 65+ Low/Medium Risk (1 of 2 - PCV13) 1/15/2018 Allergies as of 2018  Review Complete On: 2018 By: Giovanni Mujica LPN No Known Allergies Current Immunizations  Never Reviewed No immunizations on file. Not reviewed this visit You Were Diagnosed With   
  
 Codes Comments O[facility] gravis [facility]    -  Primary ICD-10-CM: G70.00 ICD-9-CM: 358.00 Vitals BP Pulse Weight(growth percentile) SpO2 BMI Smoking Status 116/66 86 232 lb (105.2 kg) 94% 32.36 kg/m2 Former Smoker BMI and BSA Data Body Mass Index Body Surface Area  
 32.36 kg/m 2 2.3 m 2 Preferred Pharmacy Pharmacy Name Phone [address] [facility], [facility] Elisa Harris [phone] Your Updated Medication List  
  
   
This list is accurate as of 18  9:06 AM.  Always use your most recent med list.  
  
  
  
  
 Dwayne Hicks 250-50 mcg/dose diskus inhaler Generic drug:  fluticasone-salmeterol Take 1 Puff by inhalation every twelve (12) hours. amitriptyline 10 mg tablet Commonly known as:  ELAVIL Take 10 mg by mouth daily. aspirin delayed-release 81 mg tablet Take  by mouth daily. CADUET 10-20 mg per tablet Generic drug:  amLODIPine-atorvastatin Take 1 Tab by mouth nightly. fenofibric acid 135 mg capsule Commonly known as:  TRILIPIX ER Take 135 mg by mouth nightly. finasteride 5 mg tablet Commonly known as:  PROSCAR Take 5 mg by mouth daily. FLOMAX 0.4 mg capsule Generic drug:  tamsulosin Take 0.4 mg by mouth nightly. FLONASE 50 mcg/actuation nasal spray Generic drug:  fluticasone 2 Sprays by Both Nostrils route daily as needed. gabapentin 600 mg tablet Commonly known as:  NEURONTIN Take  by mouth two (2) times a day. irbesartan 300 mg tablet Commonly known as:  AVAPRO Take 300 mg by mouth nightly. KLOR-CON 10 10 mEq tablet Generic drug:  potassium chloride SR Take 10 mEq by mouth two (2) times a day. LASIX 20 mg tablet Generic drug:  furosemide Take 20 mg by mouth daily. levocetirizine 5 mg tablet Commonly known as:  Ana Martyr Take 5 mg by mouth daily. NIACIN FLUSH FREE 400 mg niacin (500 mg) Cap Generic drug:  niacin-inositol Take 1 Cap by mouth nightly. predniSONE 10 mg tablet Commonly known as:  [place] Take 4 Tabs by mouth daily. PROVENTIL HFA 90 mcg/actuation inhaler Generic drug:  albuterol Take 2 Puffs by inhalation every four (4) hours as needed. pyridostigmine 60 mg tablet Commonly known as:  MESTINON Take 1 Tab by mouth Before meals, after meals and at bedtime. SINGULAIR 10 mg tablet Generic drug:  montelukast  
Take 10 mg by mouth daily. VITAMIN D3 1,000 unit Cap Generic drug:  cholecalciferol Take 1,000 Units by mouth daily. Follow-up Instructions Return in about 6 months (around 10/13/2018). Patient Instructions PRESCRIPTION REFILL POLICY [facility] Statement to Patients April 1, 2014 In an effort to ensure the large volume of patient prescription refills is processed in the most efficient and expeditious manner, we are asking our patients to assist us by calling your Pharmacy for all prescription refills, this will include also your  Mail Order Pharmacy. The pharmacy will contact our office electronically to continue the refill process. Please do not wait until the last minute to call your pharmacy. We need at least 48 hours (2days) to fill prescriptions. We also encourage you to call your pharmacy before going to  your prescription to make sure it is ready. With regard to controlled substance prescription refill requests (narcotic refills) that need to be picked up at our office, we ask your cooperation by providing us with at least 72 hours (3days) notice that you will need a refill. We will not refill narcotic prescription refill requests after 4:00pm on any weekday, Monday through Thursday, or after 2:00pm on Fridays, or on the weekends. We encourage everyone to explore another way of getting your prescription refill request processed using [employer], our patient web portal through our electronic medical record system. [employer] is an efficient and effective way to communicate your medication request directly to the office and  downloadable as an sparkle on your smart phone . [employer] also features a review functionality that allows you to view your medication list as well as leave messages for your physician. Are you ready to get connected? If so please review the attatched instructions or speak to any of our staff to get you set up right away! Thank you so much for your cooperation. Should you have any questions please contact our Practice Administrator. The Physicians and Staff,  [facility] Introducing [facility] SERVICES! Dear Sina Robertson: Thank you for requesting a [employer] account. Our records indicate that you already have an active [employer] account.   You can access your account anytime at [email or web address] Did you know that you can access your hospital and ER discharge instructions at any time in [employer]? You can also review all of your test results from your hospital stay or ER visit. Additional Information If you have questions, please visit the Frequently Asked Questions section of the [employer] website at [email or web address] Remember, [employer] is NOT to be used for urgent needs. For medical emergencies, dial 911. Now available from your iPhone and Android! Please provide this summary of care documentation to your next provider. Your primary care clinician is listed as Itzel Dorantes. If you have any questions after today's visit, please call [phone].

## 2018-04-13 NOTE — LETTER
4/13/2018 9:10 AM 
 
Patient:  Ankit Rebolledo YOB: 1955 Date of Visit: 4/13/2018 Dear Chuck Valdes MD 
[facility] [address] [id number] VIA Facsimile: [phone] 
 : Thank you for referring Mr. Valdemar Melendez to me for evaluation/treatment. Below are the relevant portions of my assessment and plan of care. If you have questions, please do not hesitate to call me. I look forward to following Mr. Irma Burrows along with you. Sincerely, Irvin Solis MD

## 2018-04-13 NOTE — PATIENT INSTRUCTIONS
[facility]   Statement to Patients  April 1, 2014      In an effort to ensure the large volume of patient prescription refills is processed in the most efficient and expeditious manner, we are asking our patients to assist us by calling your Pharmacy for all prescription refills, this will include also your  Mail Order Pharmacy. The pharmacy will contact our office electronically to continue the refill process. Please do not wait until the last minute to call your pharmacy. We need at least 48 hours (2days) to fill prescriptions. We also encourage you to call your pharmacy before going to  your prescription to make sure it is ready. With regard to controlled substance prescription refill requests (narcotic refills) that need to be picked up at our office, we ask your cooperation by providing us with at least 72 hours (3days) notice that you will need a refill. We will not refill narcotic prescription refill requests after 4:00pm on any weekday, Monday through Thursday, or after 2:00pm on Fridays, or on the weekends. We encourage everyone to explore another way of getting your prescription refill request processed using [employer], our patient web portal through our electronic medical record system. [employer] is an efficient and effective way to communicate your medication request directly to the office and  downloadable as an sparkle on your smart phone . [employer] also features a review functionality that allows you to view your medication list as well as leave messages for your physician. Are you ready to get connected? If so please review the attatched instructions or speak to any of our staff to get you set up right away! Thank you so much for your cooperation. Should you have any questions please contact our Practice Administrator.     The Physicians and Staff,  [facility]

## 2018-06-01 DIAGNOSIS — G70.00 OCULAR MYASTHENIA GRAVIS (HCC): ICD-10-CM

## 2018-06-01 RX ORDER — PYRIDOSTIGMINE BROMIDE 60 MG/1
TABLET ORAL
Qty: 700 TAB | Refills: 1 | Status: SHIPPED | OUTPATIENT
Start: 2018-06-01 | End: 2018-10-25 | Stop reason: SDUPTHER

## 2018-08-09 DIAGNOSIS — G70.00 OCULAR MYASTHENIA GRAVIS (HCC): ICD-10-CM

## 2018-08-09 RX ORDER — PREDNISONE 10 MG/1
40 TABLET ORAL DAILY
Qty: 360 TAB | Refills: 3 | Status: SHIPPED | OUTPATIENT
Start: 2018-08-09 | End: 2019-12-31 | Stop reason: SDUPTHER

## 2018-10-12 ENCOUNTER — OFFICE VISIT (OUTPATIENT)
Dept: NEUROLOGY | Age: 63
End: 2018-10-12

## 2018-10-12 VITALS
DIASTOLIC BLOOD PRESSURE: 68 MMHG | WEIGHT: 241 LBS | OXYGEN SATURATION: 98 % | SYSTOLIC BLOOD PRESSURE: 130 MMHG | BODY MASS INDEX: 33.61 KG/M2 | HEART RATE: 92 BPM

## 2018-10-12 DIAGNOSIS — G70.00 OCULAR MYASTHENIA GRAVIS (HCC): Primary | ICD-10-CM

## 2018-10-12 NOTE — LETTER
10/12/2018 9:19 AM 
 
Patient:  Giancarlo Mills YOB: 1955 Date of Visit: 10/12/2018 Dear Cosette Gosselin, MD 
[facility] [address] [facility] [address] VIA Facsimile: [phone] 
 : Thank you for referring Mr. Massimo Garcia to me for evaluation/treatment. Below are the relevant portions of my assessment and plan of care. If you have questions, please do not hesitate to call me. I look forward to following Mr. Yokasta Hodges along with you. Sincerely, Yulia Feng MD

## 2018-10-12 NOTE — PROGRESS NOTES
Neurology Progress Note    Patient ID:  Amy Mora  [id number]  9 y.o.  1955      Subjective:   History:  Mr. Amy Mora is a  Male with Asthma; H/O seasonal allergies; Hyperlipemia; and Hypertension who was admitted at [address] [place] [address] for [address] started last 8/6/16, noticeable on R gaze, associated with R eye discomfort. Patient (+) AChR Abs. CT chest negative for thymoma. His allergy to ragweed can cause flare ups described as increased blurred vision and has to adjust his Prednisone.     Since the start of fall, patient started having allergies with ragweed with note of weakness in thigh muscle, speech and SOB with exertion and had to increase Prednisone to 40 mg every day. Still takes Mestinon. ROS:  Per HPI-  Otherwise the remainder of ROS was negative    Social Hx  Social History     Social History    Marital status:      Spouse name: N/A    Number of children: N/A    Years of education: N/A     Social History Main Topics    Smoking status: Former Smoker    Smokeless tobacco: Never Used    Alcohol use No    Drug use: None    Sexual activity: Not Asked     Other Topics Concern    None     Social History Narrative       Meds:  Current Outpatient Prescriptions on File Prior to Visit   Medication Sig Dispense Refill    predniSONE (DELTASONE) 10 mg tablet Take 4 Tabs by mouth daily. 360 Tab 3    pyridostigmine (MESTINON) 60 mg tablet TAKE 1 TABLET BY MOUTH BEFORE MEALS , AFTER MEALS AND AT BEDTIME 700 Tab 1    amitriptyline (ELAVIL) 10 mg tablet Take 10 mg by mouth daily. 3    finasteride (PROSCAR) 5 mg tablet Take 5 mg by mouth daily. 3    gabapentin (NEURONTIN) 600 mg tablet Take  by mouth two (2) times a day.  furosemide (LASIX) 20 mg tablet Take 20 mg by mouth daily.  fluticasone (FLONASE) 50 mcg/actuation nasal spray 2 Sprays by Both Nostrils route daily as needed.  montelukast (SINGULAIR) 10 mg tablet Take 10 mg by mouth daily.       albuterol (PROVENTIL HFA) 90 mcg/actuation inhaler Take 2 Puffs by inhalation every four (4) hours as needed.  aspirin delayed-release 81 mg tablet Take  by mouth daily.  cholecalciferol (VITAMIN D3) 1,000 unit cap Take 1,000 Units by mouth daily.  amLODIPine-atorvastatin (CADUET) 10-20 mg per tablet Take 1 Tab by mouth nightly.  irbesartan (AVAPRO) 300 mg tablet Take 300 mg by mouth nightly.  fenofibric acid (TRILIPIX ER) 135 mg capsule Take 135 mg by mouth nightly.  niacin-inositol (NIACIN FLUSH FREE) 400 mg niacin (500 mg) cap Take 1 Cap by mouth nightly.  potassium chloride SR (KLOR-CON 10) 10 mEq tablet Take 10 mEq by mouth two (2) times a day.  fluticasone-salmeterol (ADVAIR DISKUS) 250-50 mcg/dose diskus inhaler Take 1 Puff by inhalation every twelve (12) hours.  tamsulosin (FLOMAX) 0.4 mg capsule Take 0.4 mg by mouth nightly.  levocetirizine (XYZAL) 5 mg tablet Take 5 mg by mouth daily. No current facility-administered medications on file prior to visit. Imaging:    CT Results (recent):    Results from Hospital Encounter encounter on 10/11/16   CT CHEST W CONT   Narrative INDICATION: Myasthenia gravis. Evaluate for thymoma. COMPARISON: December 18, 2009 abdomen CT    TECHNIQUE:  Following the uneventful intravenous administration of 100 cc  Isovue-300, 5 mm axial images were obtained through the chest. CT dose reduction  was achieved through use of a standardized protocol tailored for this  examination and automatic exposure control for dose modulation. FINDINGS:    THYROID: No nodule. MEDIASTINUM: No mass or lymphadenopathy. No residual thymic tissue is  identified. JANESSA: No mass or lymphadenopathy. THORACIC AORTA: No dissection or aneurysm. MAIN PULMONARY ARTERY: Normal in caliber. TRACHEA/BRONCHI: Patent. ESOPHAGUS: No wall thickening or dilatation. HEART: Normal in size. Tiny pericardial effusion.  Coronary artery calcifications  are present. PLEURA: No effusion or pneumothorax. LUNGS: 3 mm left upper lobe pulmonary nodule (series 2, image 35). Otherwise  clear. INCIDENTALLY IMAGED UPPER ABDOMEN: No focal abnormality. BONES: No destructive bone lesion. Impression IMPRESSION:  No evidence of thymoma. Indeterminate 3 mm left upper lobe pulmonary nodule;  please see follow-up recommendations below. RECOMMENDATIONS FOR FOLLOW-UP AND MANAGEMENT OF NODULES SMALLER THAN 8 MM  DETECTED INCIDENTALLY AT NONSCREENING CT:    LOW-RISK PATIENTS:    LESS THAN OR EQUAL TO 4 MM:  No follow-up needed. GREATER THAN 4-6 MM:  Follow-up CT at 12 mo; if unchanged, no further follow-up. GREATER THAN 6-8 MM:  Initial follow-up CT at 6-12 months then at 18-24 months  if no change. GREATER THAN 8 MM:  Follow-up CT at around 3, 9, and 24 months, dynamic  contrast-enhanced CT, PET, and/or biopsy. HIGH-RISK PATIENTS:    LESS THAN OR EQUAL TO 4 MM:  Follow-up CT at 12 months; if unchanged, no further  follow-up. GREATER THAN 4-6 MM:  Initial follow-up CT at 6-12 months then at 18-24 months  if no change. GREATER THAN 6-8 MM:  Initial follow-up CT at 3-6 months then at 9-12 and 24  months if no change. GREATER THAN 8 MM:  Same as for low-risk patient. MRI Results (recent):    Results from [place] encounter on 09/09/16   MRI BRAIN WO CONT   Addendum Addendum: INDICATION: TIA/CVA. Double vision. EXAM: MRA of the intracranial vasculature was performed. FINDINGS:  The intracranial internal carotid arteries demonstrate normal signal. The  middle and anterior cerebral vessels demonstrate normal signal without major  occlusive change or obvious aneurysm or vascular malformation. The vertebrobasilar system is patent. The posterior cerebral arteries demonstrate normal signal.  IMPRESSION:  No definite major occlusive changes involving the intracranial  vasculature. No definite aneurysm or vascular malformation. Noel Kimble MD 9/10/2016  1:29 AM             Narrative *PRELIMINARY REPORT*    No evidence for acute infarction or acute intracranial abnormality. Mild white  matter disease is nonspecific but may represent changes of chronic small vessel  ischemic disease. MRA of the brain without evidence for acute large vessel arterial occlusion or  significant stenosis. No evidence for aneurysm    Preliminary report was provided by Dr. Guillremo Yañez, the on-call radiologist, at  10:27 PM 9/9/2016    Final report to follow. *END PRELIMINARY REPORT*    INDICATION: TIA/CVA. Double vision. EXAM: Sagittal and axial and coronal images were obtained through the brain in  varying sequences. T1-weighted, T2-weighted, FLAIR, GRE, Diffusion-weighted  sequences may be utilized. FINDINGS:    Comparison study: None are available. Sagittal images demonstrate moderate prominence of the basilar cisterns,  cortical sulci, and ventricles. Signal void is present in the cavernous carotid  arteries bilaterally. Axial and coronal images demonstrate no confluent infarct or hemorrhage or mass  effect. Mild multifocal hyperintensities are present in the periventricular deep  white matter and centrum semiovale likely microvascular changes related to  aging. Diffusion-weighted images demonstrate no acute infarct. Impression IMPRESSION:    No acute infarct, hemorrhage, or mass effect. IR Results (recent):  No results found for this or any previous visit. VAS/US Results (recent):  No results found for this or any previous visit. Reviewed records in [facility] and media tab today    Lab Review     No visits with results within 3 Month(s) from this visit.   Latest known visit with results is:    Admission on 09/09/2016, Discharged on 09/10/2016   Component Date Value Ref Range Status    Ventricular Rate 09/09/2016 78  BPM Final    Atrial Rate 09/09/2016 78  BPM Final    P-R Interval 09/09/2016 186  ms Final    QRS Duration 09/09/2016 90  ms Final    Q-T Interval 09/09/2016 356  ms Final    QTC Calculation (Bezet) 09/09/2016 405  ms Final    Calculated P Axis 09/09/2016 56  degrees Final    Calculated R Axis 09/09/2016 8  degrees Final    Calculated T Axis 09/09/2016 41  degrees Final    Diagnosis 09/09/2016    Final                    Value:Normal sinus rhythm  Normal ECG  No previous ECGs available  Confirmed by Anthony Vivas MD ([id number]) on 9/10/2016 11:38:24 AM      WBC 09/09/2016 6.8  4.1 - 11.1 K/uL Final    RBC 09/09/2016 4.28  4.10 - 5.70 M/uL Final    HGB 09/09/2016 12.4  12.1 - 17.0 g/dL Final    HCT 09/09/2016 38.4  36.6 - 50.3 % Final    MCV 09/09/2016 89.7  80.0 - 99.0 FL Final    MCH 09/09/2016 29.0  26.0 - 34.0 PG Final    MCHC 09/09/2016 32.3  30.0 - 36.5 g/dL Final    RDW 09/09/2016 15.0* 11.5 - 14.5 % Final    PLATELET 99/06/7249 777  150 - 400 K/uL Final    NEUTROPHILS 09/09/2016 58  32 - 75 % Final    LYMPHOCYTES 09/09/2016 31  12 - 49 % Final    MONOCYTES 09/09/2016 8  5 - 13 % Final    EOSINOPHILS 09/09/2016 2  0 - 7 % Final    BASOPHILS 09/09/2016 1  0 - 1 % Final    ABS. NEUTROPHILS 09/09/2016 4.0  1.8 - 8.0 K/UL Final    ABS. LYMPHOCYTES 09/09/2016 2.1  0.8 - 3.5 K/UL Final    ABS. MONOCYTES 09/09/2016 0.6  0.0 - 1.0 K/UL Final    ABS. EOSINOPHILS 09/09/2016 0.1  0.0 - 0.4 K/UL Final    ABS.  BASOPHILS 09/09/2016 0.1  0.0 - 0.1 K/UL Final    Sodium 09/09/2016 142  136 - 145 mmol/L Final    Potassium 09/09/2016 4.1  3.5 - 5.1 mmol/L Final    Chloride 09/09/2016 107  97 - 108 mmol/L Final    CO2 09/09/2016 29  21 - 32 mmol/L Final    Anion gap 09/09/2016 6  5 - 15 mmol/L Final    Glucose 09/09/2016 91  65 - 100 mg/dL Final    BUN 09/09/2016 31* 6 - 20 MG/DL Final    Creatinine 09/09/2016 1.27  0.70 - 1.30 MG/DL Final    BUN/Creatinine ratio 09/09/2016 24* 12 - 20   Final    GFR est AA 09/09/2016 >60  >60 ml/min/1.73m2 Final    GFR est non-AA 09/09/2016 57* >60 ml/min/1.73m2 Final    Comment: Estimated GFR is calculated using the IDMS-traceable Modification of Diet in Renal Disease (MDRD) Study equation, reported for both  Americans (GFRAA) and non- Americans (GFRNA), and normalized to 1.73m2 body surface area. The physician must decide which value applies to the patient. The MDRD study equation should only be used in individuals age 25 or older. It has not been validated for the following: pregnant women, patients with serious comorbid conditions, or on certain medications, or persons with extremes of body size, muscle mass, or nutritional status.  Calcium 09/09/2016 8.9  8.5 - 10.1 MG/DL Final    Bilirubin, total 09/09/2016 0.3  0.2 - 1.0 MG/DL Final    ALT (SGPT) 09/09/2016 25  12 - 78 U/L Final    AST (SGOT) 09/09/2016 11* 15 - 37 U/L Final    Alk. phosphatase 09/09/2016 38* 45 - 117 U/L Final    Protein, total 09/09/2016 6.9  6.4 - 8.2 g/dL Final    Albumin 09/09/2016 3.9  3.5 - 5.0 g/dL Final    Globulin 09/09/2016 3.0  2.0 - 4.0 g/dL Final    A-G Ratio 09/09/2016 1.3  1.1 - 2.2   Final    Troponin-I, Qt. 09/09/2016 <0.04  <0.05 ng/mL Final    Comment: The presence of detectable troponin above the reference range indicates myocardial injury which may be due to ischemia, myocarditis, trauma, etc.  Clinical correlation is necessary to establish the significance of this finding. Sequential testing is recommended to determine if the typical rise and fall of cTnI is demonstrated. Note:  Cardiac troponin I has a relatively long half life and may be present well after the CK MB has returned to baseline. The reference range is based on the 99th percentile of the referent population.       Color 09/09/2016 YELLOW/STRAW    Final    Color Reference Range: Straw, Yellow or Dark Yellow    Appearance 09/09/2016 CLEAR  CLEAR   Final    Specific gravity 09/09/2016 1.025  1.003 - 1.030   Final    pH (UA) 09/09/2016 6.0  5.0 - 8.0   Final    Protein 09/09/2016 NEGATIVE   NEG mg/dL Final    Glucose 09/09/2016 NEGATIVE   NEG mg/dL Final    Ketone 09/09/2016 NEGATIVE   NEG mg/dL Final    Bilirubin 09/09/2016 NEGATIVE   NEG   Final    Blood 09/09/2016 NEGATIVE   NEG   Final    Urobilinogen 09/09/2016 0.2  0.2 - 1.0 EU/dL Final    Nitrites 09/09/2016 NEGATIVE   NEG   Final    Leukocyte Esterase 09/09/2016 NEGATIVE   NEG   Final    UA:UC IF INDICATED 09/09/2016 CULTURE NOT INDICATED BY UA RESULT  CNI   Final    WBC 09/09/2016 0-4  0 - 4 /hpf Final    RBC 09/09/2016 0-5  0 - 5 /hpf Final    Epithelial cells 09/09/2016 FEW  FEW /lpf Final    Epithelial cell category consists of squamous cells and /or transitional urothelial cells. Renal tubular cells, if present, are separately identified as such.  Bacteria 09/09/2016 NEGATIVE   NEG /hpf Final    Hyaline cast 09/09/2016 0-2  0 - 5 /lpf Final    AChR Binding Ab, serum 09/09/2016 0.55* 0.00 - 0.24 nmol/L Final    Comment: (NOTE)                                Negative:   0.00 - 0.24                                Borderline: 0.25 - 0.40                                Positive:        > 0.40  Performed At: [facility]  Who@ [address] [id number]  Gianna Herrera MD FN:[id number]      AChR Blocking Ab 09/09/2016 30* 0 - 25 % Final    Comment: (NOTE)                                Negative:      0 - 25                                Borderline:   26 - 30                                Positive:         >30  Results for this test are for research purposes  only by the assay's . The performance  characteristics of this product have not been  established. Results should not be used as a  diagnostic procedure without confirmation of the  diagnosis by another medically established  diagnostic product or procedure.   Performed At: [facility]  Who@ [address] [id number]  Gianna Herrera MD CC:[id number]      AChR Modulating Ab 09/09/2016 23* 0 - 20 % Final    Comment: (NOTE)                               Negative:          <21                               Equivocal:     21 - 25                               Positive:          >25  The assay is linear between values of 12 and 64. Those <12 and >64 are reported as such. No single  value for ACR-modulating antibody should be used as  a sole basis for diagnosis or response to therapy. Performed At: [address] [id number]  Alonzo Bai MD QS:[id number]      Hemoglobin A1c 09/09/2016 5.6  4.2 - 6.3 % Final    Est. average glucose 09/09/2016 114  mg/dL Final    Comment: (NOTE)  The eAG should be interpreted with patient characteristics in mind   since ethnicity, interindividual differences, red cell lifespan,   variation in rates of glycation, etc. may affect the validity of the   calculation.  Sed rate, automated 09/09/2016 7  0 - 20 mm/hr Final    Sodium 09/10/2016 141  136 - 145 mmol/L Final    Potassium 09/10/2016 3.7  3.5 - 5.1 mmol/L Final    Chloride 09/10/2016 108  97 - 108 mmol/L Final    CO2 09/10/2016 25  21 - 32 mmol/L Final    Anion gap 09/10/2016 8  5 - 15 mmol/L Final    Glucose 09/10/2016 90  65 - 100 mg/dL Final    BUN 09/10/2016 25* 6 - 20 MG/DL Final    Creatinine 09/10/2016 1.07  0.70 - 1.30 MG/DL Final    BUN/Creatinine ratio 09/10/2016 23* 12 - 20   Final    GFR est AA 09/10/2016 >60  >60 ml/min/1.73m2 Final    GFR est non-AA 09/10/2016 >60  >60 ml/min/1.73m2 Final    Calcium 09/10/2016 8.5  8.5 - 10.1 MG/DL Final    Bilirubin, total 09/10/2016 0.9  0.2 - 1.0 MG/DL Final    ALT (SGPT) 09/10/2016 20  12 - 78 U/L Final    AST (SGOT) 09/10/2016 12* 15 - 37 U/L Final    Alk.  phosphatase 09/10/2016 30* 45 - 117 U/L Final    Protein, total 09/10/2016 5.9* 6.4 - 8.2 g/dL Final    Albumin 09/10/2016 3.4* 3.5 - 5.0 g/dL Final    Globulin 09/10/2016 2.5  2.0 - 4.0 g/dL Final    A-G Ratio 09/10/2016 1.4  1.1 - 2.2 Final    WBC 09/10/2016 10.7  4.1 - 11.1 K/uL Final    RBC 09/10/2016 4.16  4.10 - 5.70 M/uL Final    HGB 09/10/2016 12.1  12.1 - 17.0 g/dL Final    HCT 09/10/2016 37.2  36.6 - 50.3 % Final    MCV 09/10/2016 89.4  80.0 - 99.0 FL Final    MCH 09/10/2016 29.1  26.0 - 34.0 PG Final    MCHC 09/10/2016 32.5  30.0 - 36.5 g/dL Final    RDW 09/10/2016 14.9* 11.5 - 14.5 % Final    PLATELET 95/34/3443 859  150 - 400 K/uL Final    NEUTROPHILS 09/10/2016 70  32 - 75 % Final    LYMPHOCYTES 09/10/2016 19  12 - 49 % Final    MONOCYTES 09/10/2016 10  5 - 13 % Final    EOSINOPHILS 09/10/2016 1  0 - 7 % Final    BASOPHILS 09/10/2016 0  0 - 1 % Final    ABS. NEUTROPHILS 09/10/2016 7.5  1.8 - 8.0 K/UL Final    ABS. LYMPHOCYTES 09/10/2016 2.0  0.8 - 3.5 K/UL Final    ABS. MONOCYTES 09/10/2016 1.1* 0.0 - 1.0 K/UL Final    ABS. EOSINOPHILS 09/10/2016 0.1  0.0 - 0.4 K/UL Final    ABS. BASOPHILS 09/10/2016 0.0  0.0 - 0.1 K/UL Final    Magnesium 09/10/2016 2.1  1.6 - 2.4 mg/dL Final    Phosphorus 09/10/2016 3.5  2.6 - 4.7 MG/DL Final    TSH 09/10/2016 2.29  0.36 - 3.74 uIU/mL Final    Comment: (NOTE)  Due to TSH heterogeneity, both structurally and degree of   glycosylation, monoclonal antibodies used in the TSH assay may not   accurately quantitate TSH. Therefore, this result should be   correlated with clinical findings as well as with other assessments   of thyroid function, e.g., free T4, free T3.  Lyme Disease Ab, IgM, QT 09/10/2016 <0.80  0.00 - 0.79 index Final    Comment: (NOTE)                                 Negative         <0.80                                 Equivocal  0.80 - 1.19                                 Positive         >1.19  IgM levels may peak at 3-6 weeks post infection, then  gradually decline.   Performed At: [address] [id number]  Sedrick Massey MD BS:[id number]           Objective:       Exam:  Visit Vitals    /68    Pulse 92    Wt 109.3 kg (241 lb)    SpO2 98%    BMI 33.61 kg/m2     Gen: Awake, alert, follows commands  Appropriate appearance, normal speech. Oriented to all spheres. No visual field defect on confrontation exam.  Full eyes movement, with no nystagmus, no diplopia, no ptosis. Normal gag and swallow. All remaining cranial nerves were normal  Motor function: 5/5 in all extremities  (-) fatigability  Sensory: intact to LT, PP and JPS  Good FTN and HTS   Gait: Normal    Assessment:       ICD-10-CM ICD-9-CM    1. 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 Due to the need to increase Prednisone during allergy season, will refer patient for robotic thymectomy to optimize management of his myasthenia gravis  2. Continue Prednisone. Can adjust by 10 mg accordingly with goal to keep at 10 to 20 mg every day. Advise to monitor BS and to discuss with PCP if BS remains high  3. Continue Mestinon 60 mg 7 times a day   4. GI and bone prophylaxis  5. Patient is planning to retire in April 2019        Follow-up Disposition:  Return in about 6 months (around 4/12/2019).           Laura Jarvis MD  Diplomate, American Board of Psychiatry and Neurology  Diplomate, Neuromuscular Medicine  Diplomate, American Board of Electrodiagnostic Medicine

## 2018-10-12 NOTE — PATIENT INSTRUCTIONS
[facility]   Statement to Patients  April 1, 2014      In an effort to ensure the large volume of patient prescription refills is processed in the most efficient and expeditious manner, we are asking our patients to assist us by calling your Pharmacy for all prescription refills, this will include also your  Mail Order Pharmacy. The pharmacy will contact our office electronically to continue the refill process. Please do not wait until the last minute to call your pharmacy. We need at least 48 hours (2days) to fill prescriptions. We also encourage you to call your pharmacy before going to  your prescription to make sure it is ready. With regard to controlled substance prescription refill requests (narcotic refills) that need to be picked up at our office, we ask your cooperation by providing us with at least 72 hours (3days) notice that you will need a refill. We will not refill narcotic prescription refill requests after 4:00pm on any weekday, Monday through Thursday, or after 2:00pm on Fridays, or on the weekends. We encourage everyone to explore another way of getting your prescription refill request processed using [facility], our patient web portal through our electronic medical record system. [facility] is an efficient and effective way to communicate your medication request directly to the office and  downloadable as an sparkle on your smart phone . [facility] also features a review functionality that allows you to view your medication list as well as leave messages for your physician. Are you ready to get connected? If so please review the attatched instructions or speak to any of our staff to get you set up right away! Thank you so much for your cooperation. Should you have any questions please contact our Practice Administrator.     The Physicians and Staff,  [facility] [address] [facility] Statement to Patients  April 1, 2014      In an effort to ensure the large volume of patient prescription refills is processed in the most efficient and expeditious manner, we are asking our patients to assist us by calling your Pharmacy for all prescription refills, this will include also your  Mail Order Pharmacy. The pharmacy will contact our office electronically to continue the refill process. Please do not wait until the last minute to call your pharmacy. We need at least 48 hours (2days) to fill prescriptions. We also encourage you to call your pharmacy before going to  your prescription to make sure it is ready. With regard to controlled substance prescription refill requests (narcotic refills) that need to be picked up at our office, we ask your cooperation by providing us with at least 72 hours (3days) notice that you will need a refill. We will not refill narcotic prescription refill requests after 4:00pm on any weekday, Monday through Thursday, or after 2:00pm on Fridays, or on the weekends. We encourage everyone to explore another way of getting your prescription refill request processed using [facility], our patient web portal through our electronic medical record system. [facility] is an efficient and effective way to communicate your medication request directly to the office and  downloadable as an sparkle on your smart phone . [facility] also features a review functionality that allows you to view your medication list as well as leave messages for your physician. Are you ready to get connected? If so please review the attatched instructions or speak to any of our staff to get you set up right away! Thank you so much for your cooperation. Should you have any questions please contact our Practice Administrator.     The Physicians and Staff,  [facility]

## 2018-10-25 DIAGNOSIS — G70.00 OCULAR MYASTHENIA GRAVIS (HCC): ICD-10-CM

## 2018-10-25 RX ORDER — PYRIDOSTIGMINE BROMIDE 60 MG/1
TABLET ORAL
Qty: 700 TAB | Refills: 0 | Status: SHIPPED | OUTPATIENT
Start: 2018-10-25 | End: 2019-01-10 | Stop reason: SDUPTHER

## 2019-01-10 DIAGNOSIS — G70.00 OCULAR MYASTHENIA GRAVIS (HCC): ICD-10-CM

## 2019-01-10 RX ORDER — PYRIDOSTIGMINE BROMIDE 60 MG/1
TABLET ORAL
Qty: 700 TAB | Refills: 3 | Status: SHIPPED | OUTPATIENT
Start: 2019-01-10 | End: 2019-11-27 | Stop reason: SDUPTHER

## 2019-04-18 ENCOUNTER — OFFICE VISIT (OUTPATIENT)
Dept: NEUROLOGY | Age: 64
End: 2019-04-18

## 2019-04-18 VITALS
RESPIRATION RATE: 20 BRPM | OXYGEN SATURATION: 98 % | SYSTOLIC BLOOD PRESSURE: 100 MMHG | TEMPERATURE: 98.3 F | HEIGHT: 71 IN | HEART RATE: 88 BPM | DIASTOLIC BLOOD PRESSURE: 78 MMHG | WEIGHT: 236.9 LBS | BODY MASS INDEX: 33.17 KG/M2

## 2019-04-18 DIAGNOSIS — G70.00 MYASTHENIA GRAVIS (HCC): Primary | ICD-10-CM

## 2019-04-18 NOTE — PROGRESS NOTES
Patient is here for a follow up for ocular myasthenia gravis. No specific concerns today. He states that he has been feeling great. He is now retired and is feeling much better.

## 2019-04-18 NOTE — PROGRESS NOTES
Neurology Progress Note    Patient ID:  Karma Montgomery  [id number]  21 y.o.  1955      Subjective:   History:  Mr. Karma Montgomery is a  Male with Asthma; H/O seasonal allergies; Hyperlipemia; and Hypertension who was admitted at [facility] for [address] started last 8/6/16, noticeable on R gaze, associated with R eye discomfort. Patient (+) AChR Abs. CT chest negative for thymoma. His allergy to ragweed can cause flare ups described as increased blurred vision and has to adjust his Prednisone.     Patient has retired and doing well to the point that he was able to reduce Prednisone to 10 mg every day and Mestinon on 60 mg TID. However, because of ragweed allergy, patient noted increased fatigue so he had to increase the Prednisone 30 mg every day with stability of symptoms. Currently only has intermittent slurring of speech and daily eyelid droop but not constant. Patient called the number for the thoracic surgeon, but apparently no one was answering so had not done the thymectomy. ROS:  Per HPI-  Otherwise the remainder of ROS was negative    Social Hx  Social History     Socioeconomic History    Marital status:      Spouse name: Not on file    Number of children: Not on file    Years of education: Not on file    Highest education level: Not on file   Tobacco Use    Smoking status: Former Smoker    Smokeless tobacco: Never Used   Substance and Sexual Activity    Alcohol use: No       Meds:  Current Outpatient Medications on File Prior to Visit   Medication Sig Dispense Refill    pyridostigmine (MESTINON) 60 mg tablet TAKE 1 TABLET BY MOUTH BEFORE MEALS, AFTER MEALS, AND AT BEDTIME 700 Tab 3    predniSONE (DELTASONE) 10 mg tablet Take 4 Tabs by mouth daily. 360 Tab 3    amitriptyline (ELAVIL) 10 mg tablet Take 10 mg by mouth daily. 3    finasteride (PROSCAR) 5 mg tablet Take 5 mg by mouth daily. 3    gabapentin (NEURONTIN) 600 mg tablet Take  by mouth two (2) times a day.  furosemide (LASIX) 20 mg tablet Take 20 mg by mouth daily.  fluticasone (FLONASE) 50 mcg/actuation nasal spray 2 Sprays by Both Nostrils route daily as needed.  montelukast (SINGULAIR) 10 mg tablet Take 10 mg by mouth daily.  levocetirizine (XYZAL) 5 mg tablet Take 5 mg by mouth daily.  albuterol (PROVENTIL HFA) 90 mcg/actuation inhaler Take 2 Puffs by inhalation every four (4) hours as needed.  aspirin delayed-release 81 mg tablet Take  by mouth daily.  cholecalciferol (VITAMIN D3) 1,000 unit cap Take 1,000 Units by mouth daily.  amLODIPine-atorvastatin (CADUET) 10-20 mg per tablet Take 1 Tab by mouth nightly.  irbesartan (AVAPRO) 300 mg tablet Take 300 mg by mouth nightly.  fenofibric acid (TRILIPIX ER) 135 mg capsule Take 135 mg by mouth nightly.  niacin-inositol (NIACIN FLUSH FREE) 400 mg niacin (500 mg) cap Take 1 Cap by mouth nightly.  potassium chloride SR (KLOR-CON 10) 10 mEq tablet Take 10 mEq by mouth two (2) times a day.  fluticasone-salmeterol (ADVAIR DISKUS) 250-50 mcg/dose diskus inhaler Take 1 Puff by inhalation every twelve (12) hours.  tamsulosin (FLOMAX) 0.4 mg capsule Take 0.4 mg by mouth nightly. No current facility-administered medications on file prior to visit. Imaging:    CT Results (recent):  Results from Hospital Encounter encounter on 10/11/16   CT CHEST W CONT    Narrative INDICATION: Myasthenia gravis. Evaluate for thymoma. COMPARISON: December 18, 2009 abdomen CT    TECHNIQUE:  Following the uneventful intravenous administration of 100 cc  Isovue-300, 5 mm axial images were obtained through the chest. CT dose reduction  was achieved through use of a standardized protocol tailored for this  examination and automatic exposure control for dose modulation. FINDINGS:    THYROID: No nodule. MEDIASTINUM: No mass or lymphadenopathy. No residual thymic tissue is  identified.   JANESSA: No mass or lymphadenopathy. THORACIC AORTA: No dissection or aneurysm. MAIN PULMONARY ARTERY: Normal in caliber. TRACHEA/BRONCHI: Patent. ESOPHAGUS: No wall thickening or dilatation. HEART: Normal in size. Tiny pericardial effusion. Coronary artery calcifications  are present. PLEURA: No effusion or pneumothorax. LUNGS: 3 mm left upper lobe pulmonary nodule (series 2, image 35). Otherwise  clear. INCIDENTALLY IMAGED UPPER ABDOMEN: No focal abnormality. BONES: No destructive bone lesion. Impression IMPRESSION:  No evidence of thymoma. Indeterminate 3 mm left upper lobe pulmonary nodule;  please see follow-up recommendations below. RECOMMENDATIONS FOR FOLLOW-UP AND MANAGEMENT OF NODULES SMALLER THAN 8 MM  DETECTED INCIDENTALLY AT NONSCREENING CT:    LOW-RISK PATIENTS:    LESS THAN OR EQUAL TO 4 MM:  No follow-up needed. GREATER THAN 4-6 MM:  Follow-up CT at 12 mo; if unchanged, no further follow-up. GREATER THAN 6-8 MM:  Initial follow-up CT at 6-12 months then at 18-24 months  if no change. GREATER THAN 8 MM:  Follow-up CT at around 3, 9, and 24 months, dynamic  contrast-enhanced CT, PET, and/or biopsy. HIGH-RISK PATIENTS:    LESS THAN OR EQUAL TO 4 MM:  Follow-up CT at 12 months; if unchanged, no further  follow-up. GREATER THAN 4-6 MM:  Initial follow-up CT at 6-12 months then at 18-24 months  if no change. GREATER THAN 6-8 MM:  Initial follow-up CT at 3-6 months then at 9-12 and 24  months if no change. GREATER THAN 8 MM:  Same as for low-risk patient. MRI Results (recent):  Results from [place] encounter on 09/09/16   MRI BRAIN WO CONT    Addendum Addendum: INDICATION: TIA/CVA. Double vision. EXAM: MRA of the intracranial vasculature was performed.    FINDINGS:  The intracranial internal carotid arteries demonstrate normal signal. The  middle and anterior cerebral vessels demonstrate normal signal without major  occlusive change or obvious aneurysm or vascular malformation. The vertebrobasilar system is patent. The posterior cerebral arteries demonstrate normal signal.  IMPRESSION:  No definite major occlusive changes involving the intracranial  vasculature. No definite aneurysm or vascular malformation. Bran Barron MD 9/10/2016  1:29 AM          Narrative *PRELIMINARY REPORT*    No evidence for acute infarction or acute intracranial abnormality. Mild white  matter disease is nonspecific but may represent changes of chronic small vessel  ischemic disease. MRA of the brain without evidence for acute large vessel arterial occlusion or  significant stenosis. No evidence for aneurysm    Preliminary report was provided by Dr. Scooter Gillette, the on-call radiologist, at  10:27 PM 9/9/2016    Final report to follow. *END PRELIMINARY REPORT    INDICATION: TIA/CVA. Double vision. EXAM: Sagittal and axial and coronal images were obtained through the brain in  varying sequences. T1-weighted, T2-weighted, FLAIR, GRE, Diffusion-weighted  sequences may be utilized. FINDINGS:    Comparison study: None are available. Sagittal images demonstrate moderate prominence of the basilar cisterns,  cortical sulci, and ventricles. Signal void is present in the cavernous carotid  arteries bilaterally. Axial and coronal images demonstrate no confluent infarct or hemorrhage or mass  effect. Mild multifocal hyperintensities are present in the periventricular deep  white matter and centrum semiovale likely microvascular changes related to  aging. Diffusion-weighted images demonstrate no acute infarct. Impression IMPRESSION:    No acute infarct, hemorrhage, or mass effect. IR Results (recent):  No results found for this or any previous visit. VAS/US Results (recent):  No results found for this or any previous visit. Reviewed records in [place] and media tab today    Lab Review     No visits with results within 3 Month(s) from this visit. Latest known visit with results is:   Admission on 09/09/2016, Discharged on 09/10/2016   Component Date Value Ref Range Status    Ventricular Rate 09/09/2016 78  BPM Final    Atrial Rate 09/09/2016 78  BPM Final    P-R Interval 09/09/2016 186  ms Final    QRS Duration 09/09/2016 90  ms Final    Q-T Interval 09/09/2016 356  ms Final    QTC Calculation (Bezet) 09/09/2016 405  ms Final    Calculated P Axis 09/09/2016 56  degrees Final    Calculated R Axis 09/09/2016 8  degrees Final    Calculated T Axis 09/09/2016 41  degrees Final    Diagnosis 09/09/2016    Final                    Value:Normal sinus rhythm  Normal ECG  No previous ECGs available  Confirmed by Anthony Vivas MD ([id number]) on 9/10/2016 11:38:24 AM      WBC 09/09/2016 6.8  4.1 - 11.1 K/uL Final    RBC 09/09/2016 4.28  4.10 - 5.70 M/uL Final    HGB 09/09/2016 12.4  12.1 - 17.0 g/dL Final    HCT 09/09/2016 38.4  36.6 - 50.3 % Final    MCV 09/09/2016 89.7  80.0 - 99.0 FL Final    MCH 09/09/2016 29.0  26.0 - 34.0 PG Final    MCHC 09/09/2016 32.3  30.0 - 36.5 g/dL Final    RDW 09/09/2016 15.0* 11.5 - 14.5 % Final    PLATELET 25/37/2828 837  150 - 400 K/uL Final    NEUTROPHILS 09/09/2016 58  32 - 75 % Final    LYMPHOCYTES 09/09/2016 31  12 - 49 % Final    MONOCYTES 09/09/2016 8  5 - 13 % Final    EOSINOPHILS 09/09/2016 2  0 - 7 % Final    BASOPHILS 09/09/2016 1  0 - 1 % Final    ABS. NEUTROPHILS 09/09/2016 4.0  1.8 - 8.0 K/UL Final    ABS. LYMPHOCYTES 09/09/2016 2.1  0.8 - 3.5 K/UL Final    ABS. MONOCYTES 09/09/2016 0.6  0.0 - 1.0 K/UL Final    ABS. EOSINOPHILS 09/09/2016 0.1  0.0 - 0.4 K/UL Final    ABS.  BASOPHILS 09/09/2016 0.1  0.0 - 0.1 K/UL Final    Sodium 09/09/2016 142  136 - 145 mmol/L Final    Potassium 09/09/2016 4.1  3.5 - 5.1 mmol/L Final    Chloride 09/09/2016 107  97 - 108 mmol/L Final    CO2 09/09/2016 29  21 - 32 mmol/L Final    Anion gap 09/09/2016 6  5 - 15 mmol/L Final    Glucose 09/09/2016 91  65 - 100 mg/dL Final    BUN 09/09/2016 31* 6 - 20 MG/DL Final    Creatinine 09/09/2016 1.27  0.70 - 1.30 MG/DL Final    BUN/Creatinine ratio 09/09/2016 24* 12 - 20   Final    GFR est AA 09/09/2016 >60  >60 ml/min/1.73m2 Final    GFR est non-AA 09/09/2016 57* >60 ml/min/1.73m2 Final    Comment: Estimated GFR is calculated using the IDMS-traceable Modification of Diet in Renal Disease (MDRD) Study equation, reported for both  Americans (GFRAA) and non- Americans (GFRNA), and normalized to 1.73m2 body surface area. The physician must decide which value applies to the patient. The MDRD study equation should only be used in individuals age 25 or older. It has not been validated for the following: pregnant women, patients with serious comorbid conditions, or on certain medications, or persons with extremes of body size, muscle mass, or nutritional status.  Calcium 09/09/2016 8.9  8.5 - 10.1 MG/DL Final    Bilirubin, total 09/09/2016 0.3  0.2 - 1.0 MG/DL Final    ALT (SGPT) 09/09/2016 25  12 - 78 U/L Final    AST (SGOT) 09/09/2016 11* 15 - 37 U/L Final    Alk. phosphatase 09/09/2016 38* 45 - 117 U/L Final    Protein, total 09/09/2016 6.9  6.4 - 8.2 g/dL Final    Albumin 09/09/2016 3.9  3.5 - 5.0 g/dL Final    Globulin 09/09/2016 3.0  2.0 - 4.0 g/dL Final    A-G Ratio 09/09/2016 1.3  1.1 - 2.2   Final    Troponin-I, Qt. 09/09/2016 <0.04  <0.05 ng/mL Final    Comment: The presence of detectable troponin above the reference range indicates myocardial injury which may be due to ischemia, myocarditis, trauma, etc.  Clinical correlation is necessary to establish the significance of this finding. Sequential testing is recommended to determine if the typical rise and fall of cTnI is demonstrated. Note:  Cardiac troponin I has a relatively long half life and may be present well after the CK MB has returned to baseline. The reference range is based on the 99th percentile of the referent population.       Color 09/09/2016 YELLOW/STRAW    Final    Color Reference Range: Straw, Yellow or Dark Yellow    Appearance 09/09/2016 CLEAR  CLEAR   Final    Specific gravity 09/09/2016 1.025  1.003 - 1.030   Final    pH (UA) 09/09/2016 6.0  5.0 - 8.0   Final    Protein 09/09/2016 NEGATIVE   NEG mg/dL Final    Glucose 09/09/2016 NEGATIVE   NEG mg/dL Final    Ketone 09/09/2016 NEGATIVE   NEG mg/dL Final    Bilirubin 09/09/2016 NEGATIVE   NEG   Final    Blood 09/09/2016 NEGATIVE   NEG   Final    Urobilinogen 09/09/2016 0.2  0.2 - 1.0 EU/dL Final    Nitrites 09/09/2016 NEGATIVE   NEG   Final    Leukocyte Esterase 09/09/2016 NEGATIVE   NEG   Final    UA:UC IF INDICATED 09/09/2016 CULTURE NOT INDICATED BY UA RESULT  CNI   Final    WBC 09/09/2016 0-4  0 - 4 /hpf Final    RBC 09/09/2016 0-5  0 - 5 /hpf Final    Epithelial cells 09/09/2016 FEW  FEW /lpf Final    Epithelial cell category consists of squamous cells and /or transitional urothelial cells. Renal tubular cells, if present, are separately identified as such.  Bacteria 09/09/2016 NEGATIVE   NEG /hpf Final    Hyaline cast 09/09/2016 0-2  0 - 5 /lpf Final    AChR Binding Ab, serum 09/09/2016 0.55* 0.00 - 0.24 nmol/L Final    Comment: (NOTE)                                Negative:   0.00 - 0.24                                Borderline: 0.25 - 0.40                                Positive:        > 0.40  Performed At: [address] [id number]  Farida Melchor MD JA:[id number]      AChR Blocking Ab 09/09/2016 30* 0 - 25 % Final    Comment: (NOTE)                                Negative:      0 - 25                                Borderline:   26 - 30                                Positive:         >30  Results for this test are for research purposes  only by the assay's . The performance  characteristics of this product have not been  established.   Results should not be used as a  diagnostic procedure without confirmation of the  diagnosis by another medically established  diagnostic product or procedure. Performed At: [facility] [address] [id number]  Farida Melchor MD RP:[id number]      AChR Modulating Ab 09/09/2016 23* 0 - 20 % Final    Comment: (NOTE)                               Negative:          <21                               Equivocal:     21 - 25                               Positive:          >25  The assay is linear between values of 12 and 64. Those <12 and >64 are reported as such. No single  value for ACR-modulating antibody should be used as  a sole basis for diagnosis or response to therapy. Performed At: [facility]  [facility] [address] [id number]  Farida Melchor MD FU:[id number]      Hemoglobin A1c 09/09/2016 5.6  4.2 - 6.3 % Final    Est. average glucose 09/09/2016 114  mg/dL Final    Comment: (NOTE)  The eAG should be interpreted with patient characteristics in mind   since ethnicity, interindividual differences, red cell lifespan,   variation in rates of glycation, etc. may affect the validity of the   calculation.       Sed rate, automated 09/09/2016 7  0 - 20 mm/hr Final    Sodium 09/10/2016 141  136 - 145 mmol/L Final    Potassium 09/10/2016 3.7  3.5 - 5.1 mmol/L Final    Chloride 09/10/2016 108  97 - 108 mmol/L Final    CO2 09/10/2016 25  21 - 32 mmol/L Final    Anion gap 09/10/2016 8  5 - 15 mmol/L Final    Glucose 09/10/2016 90  65 - 100 mg/dL Final    BUN 09/10/2016 25* 6 - 20 MG/DL Final    Creatinine 09/10/2016 1.07  0.70 - 1.30 MG/DL Final    BUN/Creatinine ratio 09/10/2016 23* 12 - 20   Final    GFR est AA 09/10/2016 >60  >60 ml/min/1.73m2 Final    GFR est non-AA 09/10/2016 >60  >60 ml/min/1.73m2 Final    Calcium 09/10/2016 8.5  8.5 - 10.1 MG/DL Final    Bilirubin, total 09/10/2016 0.9  0.2 - 1.0 MG/DL Final    ALT (SGPT) 09/10/2016 20  12 - 78 U/L Final    AST (SGOT) 09/10/2016 12* 15 - 37 U/L Final    Alk. phosphatase 09/10/2016 30* 45 - 117 U/L Final    Protein, total 09/10/2016 5.9* 6.4 - 8.2 g/dL Final    Albumin 09/10/2016 3.4* 3.5 - 5.0 g/dL Final    Globulin 09/10/2016 2.5  2.0 - 4.0 g/dL Final    A-G Ratio 09/10/2016 1.4  1.1 - 2.2   Final    WBC 09/10/2016 10.7  4.1 - 11.1 K/uL Final    RBC 09/10/2016 4.16  4.10 - 5.70 M/uL Final    HGB 09/10/2016 12.1  12.1 - 17.0 g/dL Final    HCT 09/10/2016 37.2  36.6 - 50.3 % Final    MCV 09/10/2016 89.4  80.0 - 99.0 FL Final    MCH 09/10/2016 29.1  26.0 - 34.0 PG Final    MCHC 09/10/2016 32.5  30.0 - 36.5 g/dL Final    RDW 09/10/2016 14.9* 11.5 - 14.5 % Final    PLATELET 53/11/7361 365  150 - 400 K/uL Final    NEUTROPHILS 09/10/2016 70  32 - 75 % Final    LYMPHOCYTES 09/10/2016 19  12 - 49 % Final    MONOCYTES 09/10/2016 10  5 - 13 % Final    EOSINOPHILS 09/10/2016 1  0 - 7 % Final    BASOPHILS 09/10/2016 0  0 - 1 % Final    ABS. NEUTROPHILS 09/10/2016 7.5  1.8 - 8.0 K/UL Final    ABS. LYMPHOCYTES 09/10/2016 2.0  0.8 - 3.5 K/UL Final    ABS. MONOCYTES 09/10/2016 1.1* 0.0 - 1.0 K/UL Final    ABS. EOSINOPHILS 09/10/2016 0.1  0.0 - 0.4 K/UL Final    ABS. BASOPHILS 09/10/2016 0.0  0.0 - 0.1 K/UL Final    Magnesium 09/10/2016 2.1  1.6 - 2.4 mg/dL Final    Phosphorus 09/10/2016 3.5  2.6 - 4.7 MG/DL Final    TSH 09/10/2016 2.29  0.36 - 3.74 uIU/mL Final    Comment: (NOTE)  Due to TSH heterogeneity, both structurally and degree of   glycosylation, monoclonal antibodies used in the TSH assay may not   accurately quantitate TSH. Therefore, this result should be   correlated with clinical findings as well as with other assessments   of thyroid function, e.g., free T4, free T3.       Lyme Disease Ab, IgM, QT 09/10/2016 <0.80  0.00 - 0.79 index Final    Comment: (NOTE)                                 Negative         <0.80                                 Equivocal  0.80 - 1.19                                 Positive         >1.19  IgM levels may peak at 3-6 weeks post infection, then  gradually decline. Performed At: [address] [id number]  Stacy Bahena MD TD:[id number]           Objective:       Exam:  Visit Vitals  /78 (BP 1 Location: Left arm, BP Patient Position: Sitting)   Pulse 88   Temp 98.3 °F (36.8 °C) (Oral)   Resp 20   Ht 5' 11\" (1.803 m)   Wt 107.5 kg (236 lb 14.4 oz)   SpO2 98%   BMI 33.04 kg/m²     Gen: Awake, alert, follows commands  Appropriate appearance, normal speech. Oriented to all spheres. No visual field defect on confrontation exam.  Full eyes movement, with no nystagmus, no diplopia, no ptosis. Normal gag and swallow. All remaining cranial nerves were normal  Motor function: 5/5 in all extremities  Sensory: intact to LT, PP and JPS  DTRs ++ in all extremities, (-) Babinski  Good FTN and HTS   Gait: Normal    Assessment:       ICD-10-CM ICD-9-CM    1. Myasthenia gravis ([facility] [address] G70.00 358.00 REFERRAL TO CARDIOTHORACIC SURGEON       MG ADL 3    Plan:     1. Due to the need to increase Prednisone during allergy season, will refer patient for robotic thymectomy to optimize management of his myasthenia gravis. Called Dr Princess Gilberto santos (initially the person we spoke to said that they don't do it). Eventually we are able to talk to someone in the office who knows what is going on. 2. Continue Prednisone 30 mg QD. Can adjust by 10 mg accordingly with goal to keep at 10 to 20 mg every day. Advise to monitor BS and to discuss with PCP if BS remains high  3. Continue Mestinon 60 mg 3 to 7 times a day   4. GI and bone prophylaxis      Follow-up and Dispositions    · Return in about 6 months (around 10/18/2019).                Amado Aaron MD  Diplomate, American Board of Psychiatry and Neurology  Diplomate, Neuromuscular Medicine  Diplomate, American Board of Electrodiagnostic Medicine

## 2019-04-18 NOTE — LETTER
4/18/19 Patient: Carroll Bahena YOB: 1955 Date of Visit: 4/18/2019 Isaias Jimenez MD 
[facility] [address] [id number] VIA Facsimile: [phone] Dear Isaias Jimenez MD, Thank you for referring Mr. Marcy Stubbs to [facility] for evaluation. My notes for this consultation are attached. If you have questions, please do not hesitate to call me. I look forward to following your patient along with you. Sincerely, Shelbie Alcocer MD

## 2019-05-14 ENCOUNTER — TELEPHONE (OUTPATIENT)
Dept: NEUROLOGY | Age: 64
End: 2019-05-14

## 2019-05-14 DIAGNOSIS — G70.00 MYASTHENIA GRAVIS (HCC): Primary | ICD-10-CM

## 2019-05-14 NOTE — TELEPHONE ENCOUNTER
----- Message from Swetha Jain sent at 5/14/2019  3:40 PM EDT -----  Regarding: Dr. Nicole Marcus  Pt request for a call back from the practice in regards to a referral that he recently received. Best contact number is [phone].

## 2019-05-15 NOTE — TELEPHONE ENCOUNTER
Called and spoke to patient he states that his records were never faxed to Dr Shabbir Nova office and that they never heard of dr Kasey River   Let patient know will make sure this it the physician dr Kasey River wants him to see and will call and let him know when I am faxing records  Patient states understanding

## 2019-05-15 NOTE — TELEPHONE ENCOUNTER
Called dr Simone Mccarthy office spoke to Giovanni Bueno  She states patient will need updated imaging before dr can see patient  Per dr Quynh Caro he will order updated ct  And will fax progress notes to them again    Let patient know he will need to have ct gave number to alfredo will need to take cd to appt call to make once ct done and call us so we can fax report to them   Patient states understanding

## 2019-05-24 ENCOUNTER — HOSPITAL ENCOUNTER (OUTPATIENT)
Dept: CT IMAGING | Age: 64
Discharge: HOME | End: 2019-05-24
Attending: PSYCHIATRY & NEUROLOGY
Payer: MEDICARE

## 2019-05-24 DIAGNOSIS — G70.00 MYASTHENIA GRAVIS (HCC): ICD-10-CM

## 2019-05-24 PROCEDURE — 74011636320 HC RX REV CODE- 636/320: Performed by: PSYCHIATRY & NEUROLOGY

## 2019-05-24 PROCEDURE — 71260 CT THORAX DX C+: CPT

## 2019-05-24 RX ADMIN — IOPAMIDOL 100 ML: 612 INJECTION, SOLUTION INTRAVENOUS at 10:50

## 2019-05-30 RX ORDER — PREDNISONE 10 MG/1
TABLET ORAL
Qty: 360 TAB | Refills: 3 | Status: SHIPPED | OUTPATIENT
Start: 2019-05-30 | End: 2021-09-13 | Stop reason: DRUGHIGH

## 2019-06-03 ENCOUNTER — TELEPHONE (OUTPATIENT)
Dept: NEUROLOGY | Age: 64
End: 2019-06-03

## 2019-06-03 NOTE — TELEPHONE ENCOUNTER
----- Message from Gisele Reynolds MD sent at 6/3/2019 10:07 AM EDT -----  Pls follow up if he already scheduled for Thymectomy. 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  Also send copy of CT chest to PCP.      ----- Message -----  From: Joe Swartz Results In  Sent: 5/24/2019  11:23 AM  To: Gisele Reynolds MD

## 2019-06-11 NOTE — TELEPHONE ENCOUNTER
Pt calling to check the status of a return call. Pt states he has been going back and forth for 3 weeks.  He would like to be called as soon as possible at [phone]

## 2019-06-11 NOTE — TELEPHONE ENCOUNTER
Called and spoke to patient he states that the nurse was suppose to make appt?   Called dr Reddy Point office spoke to Ute Chavez  July 18 at 2 pm is the first avail since  only sees patients on thursdays and patient will be out of town for 3 weeks  Spoke to patient relayed this information   He states understanding

## 2019-06-13 ENCOUNTER — HOSPITAL ENCOUNTER (OUTPATIENT)
Dept: LAB | Age: 64
Discharge: HOME | End: 2019-06-13
Payer: MEDICARE

## 2019-06-13 PROCEDURE — 88305 TISSUE EXAM BY PATHOLOGIST: CPT

## 2019-06-18 ENCOUNTER — HOSPITAL ENCOUNTER (OUTPATIENT)
Dept: MRI IMAGING | Age: 64
Discharge: HOME | End: 2019-06-18
Attending: FAMILY MEDICINE
Payer: MEDICARE

## 2019-06-18 DIAGNOSIS — K86.2 CYSTIC MASS OF PANCREAS: ICD-10-CM

## 2019-06-18 PROCEDURE — 74183 MRI ABD W/O CNTR FLWD CNTR: CPT

## 2019-06-18 PROCEDURE — 74011250636 HC RX REV CODE- 250/636: Performed by: FAMILY MEDICINE

## 2019-06-18 PROCEDURE — A9585 GADOBUTROL INJECTION: HCPCS | Performed by: FAMILY MEDICINE

## 2019-06-18 RX ADMIN — GADOBUTROL 10 ML: 604.72 INJECTION INTRAVENOUS at 12:25

## 2019-10-22 ENCOUNTER — OFFICE VISIT (OUTPATIENT)
Dept: NEUROLOGY | Age: 64
End: 2019-10-22

## 2019-10-22 VITALS
SYSTOLIC BLOOD PRESSURE: 128 MMHG | RESPIRATION RATE: 16 BRPM | OXYGEN SATURATION: 99 % | BODY MASS INDEX: 35.28 KG/M2 | WEIGHT: 252 LBS | DIASTOLIC BLOOD PRESSURE: 76 MMHG | HEIGHT: 71 IN | HEART RATE: 99 BPM

## 2019-10-22 DIAGNOSIS — G70.00 MYASTHENIA GRAVIS (HCC): Primary | ICD-10-CM

## 2019-10-22 RX ORDER — MYCOPHENOLATE MOFETIL 500 MG/1
500 TABLET ORAL 2 TIMES DAILY
Qty: 180 TAB | Refills: 0 | Status: SHIPPED | OUTPATIENT
Start: 2019-10-22 | End: 2019-12-20 | Stop reason: DRUGHIGH

## 2019-10-22 NOTE — PROGRESS NOTES
Neurology Progress Note    Patient ID:  Morena Kumar  [id number]  60 y.o.  1955      Subjective:   History:  Mr. Morena Kumar is a  Male with Asthma; H/O seasonal allergies; Hyperlipemia; and Hypertension who was admitted at [address] [place] [address] for [address] started last 8/6/16, noticeable on R gaze, associated with R eye discomfort. Patient (+) AChR Abs. CT chest negative for thymoma. His allergy to ragweed can cause flare ups described as increased blurred vision and has to adjust his Prednisone. Patient still needs to increase Prednisone 10 mg to 2 to 3 tabs depending on the day and ragweed season. Still needs to take 6 Mestinon/ day. Currently has intermittent slurred speech, problem with chewing, dysphagia and mild SOB with ptosis    Patient saw Dr Bear Domínguez and is planning to have the thymectomy on Jan 2020. ROS:  Per HPI-  Otherwise the remainder of ROS was negative    Social Hx  Social History     Socioeconomic History    Marital status:      Spouse name: Not on file    Number of children: Not on file    Years of education: Not on file    Highest education level: Not on file   Tobacco Use    Smoking status: Former Smoker    Smokeless tobacco: Never Used   Substance and Sexual Activity    Alcohol use: No       Meds:  Current Outpatient Medications on File Prior to Visit   Medication Sig Dispense Refill    pyridostigmine (MESTINON) 60 mg tablet TAKE 1 TABLET BY MOUTH BEFORE MEALS, AFTER MEALS, AND AT BEDTIME 700 Tab 3    predniSONE (DELTASONE) 10 mg tablet Take 4 Tabs by mouth daily. (Patient taking differently: Take 40 mg by mouth daily. Indications: patient states that he is taking up to 3 daily) 360 Tab 3    amitriptyline (ELAVIL) 10 mg tablet Take 10 mg by mouth daily. 3    finasteride (PROSCAR) 5 mg tablet Take 5 mg by mouth daily. 3    gabapentin (NEURONTIN) 600 mg tablet Take  by mouth two (2) times a day.       tamsulosin (FLOMAX) 0.4 mg capsule Take 0.4 mg by mouth as needed.  furosemide (LASIX) 20 mg tablet Take 20 mg by mouth daily.  fluticasone (FLONASE) 50 mcg/actuation nasal spray 2 Sprays by Both Nostrils route daily as needed.  montelukast (SINGULAIR) 10 mg tablet Take 10 mg by mouth daily.  albuterol (PROVENTIL HFA) 90 mcg/actuation inhaler Take 2 Puffs by inhalation every four (4) hours as needed.  cholecalciferol (VITAMIN D3) 1,000 unit cap Take 1,000 Units by mouth daily.  amLODIPine-atorvastatin (CADUET) 10-20 mg per tablet Take 1 Tab by mouth nightly.  irbesartan (AVAPRO) 300 mg tablet Take 300 mg by mouth nightly.  fenofibric acid (TRILIPIX ER) 135 mg capsule Take 135 mg by mouth nightly.  niacin-inositol (NIACIN FLUSH FREE) 400 mg niacin (500 mg) cap Take 1 Cap by mouth nightly.  potassium chloride SR (KLOR-CON 10) 10 mEq tablet Take 10 mEq by mouth two (2) times a day.  fluticasone-salmeterol (ADVAIR DISKUS) 250-50 mcg/dose diskus inhaler Take 1 Puff by inhalation every twelve (12) hours.  predniSONE (DELTASONE) 10 mg tablet TAKE 4 TABLETS BY MOUTH DAILY 360 Tab 3    levocetirizine (XYZAL) 5 mg tablet Take 5 mg by mouth daily.  aspirin delayed-release 81 mg tablet Take  by mouth daily. No current facility-administered medications on file prior to visit. Imaging:    CT Results (recent):  Results from Hospital Encounter encounter on 05/24/19   CT CHEST W CONT    Narrative Indication: Myasthenia gravis evaluate for thymoma. COMPARISON: 10/11/2016    Multiple axial images were obtained from the lung apices to the adrenal glands  following the uneventful administration of 100 mL of Isovue 300. Images  reformatted in sagittal coronal plane. CT dose reduction was achieved through  use of a standardized protocol tailored for this examination and automatic  exposure control for dose modulation. The thyroid gland is unremarkable. No axillary adenopathy.     There is no mediastinal or hilar adenopathy. No evidence for anterior  mediastinal mass or thymoma. Small pericardial effusion has decreased. No  pleural effusions. The lungs are clear of an acute process. The 3 mm pulmonary nodule in the left  upper lobe with smooth margins is stable. No new pulmonary nodules. Review of bone windows is unremarkable. Incidental imaging through the upper abdomen reveals a new oval-shaped  low-density possibly cystic lesion adjacent to the pancreas and possibly arising  from the pancreas. This projects just inferior to the stomach as well. Exact  origin of this is uncertain. This was not present previously. This measures 2.2  x 1.6 cm. Further evaluation is suggested. MRI of the abdomen is more  information. In the anterior aspect of the spleen is a new rounded hypodensity measuring 8  mm. This was not present previously. Posteriorly in the spleen is a small  hypodensity that has not changed significantly when compared to previous exam.    No adrenal lesions. Impression 1. No evidence for thymoma  2. Stable pulmonary nodule in the left upper lobe. 3. There is a new low density probably cystic lesion in the upper abdomen  adjacent to the pancreas and stomach. MRI of the pancreas is suggested. Exact  origin of this new lesion is uncertain. 4. New small hypodensity in the spleen too small to characterize. 23X           MRI Results (recent):  Results from Hospital Encounter encounter on 06/18/19   MRI ABD W WO CONT    Narrative EXAM:  MRI ABD W WO CONT    INDICATION:   Cystic mass of pancreas    COMPARISON: None. CONTRAST:  20 mL Gadavist.    TECHNIQUE:   MR of the abdomen was performed and the following sequences were obtained:  Coronal HASTE, multiplanar MRCP, axial in and out-of-phase T1, axial T1  fat-saturated, axial T2, and pre- and post contrast T1-weighted images. FINDINGS:  The liver is normal. There is a 5 mm cyst in segment 2 . Patient status post  cholecystectomy.   The spleen is normal. There are 2 tiny cysts measuring 5 mm  and 6 mm. .  The pancreatic duct is normal. There is a 4 x 2 mm cyst in the pancreatic  head. There is a 1.5 x 0.6 cm cyst in pancreatic neck. There is a 2.1 x 1.5 cm  cyst projected off of the pancreatic body superiorly. There is a 1.1 x 0.3 cm  cyst in the pancreatic body inferiorly. There is a 0.7 cm cyst in pancreatic  tail. .  The adrenal glands are normal.  The kidneys are normal. There is a 2.2 x 1.2  cm left renal cyst    There is no intra or extrahepatic biliary ductal dilatation. No biliary  strictures or filling defects are seen. There is no pancreas divisum. There is no lymphadenopathy or ascites. Impression IMPRESSION:    1. There are several cysts in the pancreas as described above with the largest  measuring 2.1 x 1.5 cm projected off the pancreatic body superiorly and these  may represent sidebranch IP N. .. No definite enhancing lesions are identified  within the pancreas. . Follow-up MR in 6-12 months would be helpful to assess for  interval change. 23X             IR Results (recent):  No results found for this or any previous visit. VAS/US Results (recent):  No results found for this or any previous visit. Reviewed records in Pay with a Tweet and media tab today    Lab Review     No visits with results within 3 Month(s) from this visit.    Latest known visit with results is:   Admission on 09/09/2016, Discharged on 09/10/2016   Component Date Value Ref Range Status    Ventricular Rate 09/09/2016 78  BPM Final    Atrial Rate 09/09/2016 78  BPM Final    P-R Interval 09/09/2016 186  ms Final    QRS Duration 09/09/2016 90  ms Final    Q-T Interval 09/09/2016 356  ms Final    QTC Calculation (Bezet) 09/09/2016 405  ms Final    Calculated P Axis 09/09/2016 56  degrees Final    Calculated R Axis 09/09/2016 8  degrees Final    Calculated T Axis 09/09/2016 41  degrees Final    Diagnosis 09/09/2016    Final Value:Normal sinus rhythm  Normal ECG  No previous ECGs available  Confirmed by Anthony Vivas MD ([id number]) on 9/10/2016 11:38:24 AM      WBC 09/09/2016 6.8  4.1 - 11.1 K/uL Final    RBC 09/09/2016 4.28  4.10 - 5.70 M/uL Final    HGB 09/09/2016 12.4  12.1 - 17.0 g/dL Final    HCT 09/09/2016 38.4  36.6 - 50.3 % Final    MCV 09/09/2016 89.7  80.0 - 99.0 FL Final    MCH 09/09/2016 29.0  26.0 - 34.0 PG Final    MCHC 09/09/2016 32.3  30.0 - 36.5 g/dL Final    RDW 09/09/2016 15.0* 11.5 - 14.5 % Final    PLATELET 87/50/2336 855  150 - 400 K/uL Final    NEUTROPHILS 09/09/2016 58  32 - 75 % Final    LYMPHOCYTES 09/09/2016 31  12 - 49 % Final    MONOCYTES 09/09/2016 8  5 - 13 % Final    EOSINOPHILS 09/09/2016 2  0 - 7 % Final    BASOPHILS 09/09/2016 1  0 - 1 % Final    ABS. NEUTROPHILS 09/09/2016 4.0  1.8 - 8.0 K/UL Final    ABS. LYMPHOCYTES 09/09/2016 2.1  0.8 - 3.5 K/UL Final    ABS. MONOCYTES 09/09/2016 0.6  0.0 - 1.0 K/UL Final    ABS. EOSINOPHILS 09/09/2016 0.1  0.0 - 0.4 K/UL Final    ABS. BASOPHILS 09/09/2016 0.1  0.0 - 0.1 K/UL Final    Sodium 09/09/2016 142  136 - 145 mmol/L Final    Potassium 09/09/2016 4.1  3.5 - 5.1 mmol/L Final    Chloride 09/09/2016 107  97 - 108 mmol/L Final    CO2 09/09/2016 29  21 - 32 mmol/L Final    Anion gap 09/09/2016 6  5 - 15 mmol/L Final    Glucose 09/09/2016 91  65 - 100 mg/dL Final    BUN 09/09/2016 31* 6 - 20 MG/DL Final    Creatinine 09/09/2016 1.27  0.70 - 1.30 MG/DL Final    BUN/Creatinine ratio 09/09/2016 24* 12 - 20   Final    GFR est AA 09/09/2016 >60  >60 ml/min/1.73m2 Final    GFR est non-AA 09/09/2016 57* >60 ml/min/1.73m2 Final    Comment: Estimated GFR is calculated using the IDMS-traceable Modification of Diet in Renal Disease (MDRD) Study equation, reported for both  Americans (GFRAA) and non- Americans (GFRNA), and normalized to 1.73m2 body surface area. The physician must decide which value applies to the patient.   The MDRD study equation should only be used in individuals age 25 or older. It has not been validated for the following: pregnant women, patients with serious comorbid conditions, or on certain medications, or persons with extremes of body size, muscle mass, or nutritional status.  Calcium 09/09/2016 8.9  8.5 - 10.1 MG/DL Final    Bilirubin, total 09/09/2016 0.3  0.2 - 1.0 MG/DL Final    ALT (SGPT) 09/09/2016 25  12 - 78 U/L Final    AST (SGOT) 09/09/2016 11* 15 - 37 U/L Final    Alk. phosphatase 09/09/2016 38* 45 - 117 U/L Final    Protein, total 09/09/2016 6.9  6.4 - 8.2 g/dL Final    Albumin 09/09/2016 3.9  3.5 - 5.0 g/dL Final    Globulin 09/09/2016 3.0  2.0 - 4.0 g/dL Final    A-G Ratio 09/09/2016 1.3  1.1 - 2.2   Final    Troponin-I, Qt. 09/09/2016 <0.04  <0.05 ng/mL Final    Comment: The presence of detectable troponin above the reference range indicates myocardial injury which may be due to ischemia, myocarditis, trauma, etc.  Clinical correlation is necessary to establish the significance of this finding. Sequential testing is recommended to determine if the typical rise and fall of cTnI is demonstrated. Note:  Cardiac troponin I has a relatively long half life and may be present well after the CK MB has returned to baseline. The reference range is based on the 99th percentile of the referent population.       Color 09/09/2016 YELLOW/STRAW    Final    Color Reference Range: Straw, Yellow or Dark Yellow    Appearance 09/09/2016 CLEAR  CLEAR   Final    Specific gravity 09/09/2016 1.025  1.003 - 1.030   Final    pH (UA) 09/09/2016 6.0  5.0 - 8.0   Final    Protein 09/09/2016 NEGATIVE   NEG mg/dL Final    Glucose 09/09/2016 NEGATIVE   NEG mg/dL Final    Ketone 09/09/2016 NEGATIVE   NEG mg/dL Final    Bilirubin 09/09/2016 NEGATIVE   NEG   Final    Blood 09/09/2016 NEGATIVE   NEG   Final    Urobilinogen 09/09/2016 0.2  0.2 - 1.0 EU/dL Final    Nitrites 09/09/2016 NEGATIVE   NEG   Final    Leukocyte Esterase 09/09/2016 NEGATIVE   NEG   Final    UA:UC IF INDICATED 09/09/2016 CULTURE NOT INDICATED BY UA RESULT  CNI   Final    WBC 09/09/2016 0-4  0 - 4 /hpf Final    RBC 09/09/2016 0-5  0 - 5 /hpf Final    Epithelial cells 09/09/2016 FEW  FEW /lpf Final    Epithelial cell category consists of squamous cells and /or transitional urothelial cells. Renal tubular cells, if present, are separately identified as such.  Bacteria 09/09/2016 NEGATIVE   NEG /hpf Final    Hyaline cast 09/09/2016 0-2  0 - 5 /lpf Final    AChR Binding Ab, serum 09/09/2016 0.55* 0.00 - 0.24 nmol/L Final    Comment: (NOTE)                                Negative:   0.00 - 0.24                                Borderline: 0.25 - 0.40                                Positive:        > 0.40  Performed At: [address] [id number]  Ilan Flaherty MD ZK:[id number]      AChR Blocking Ab 09/09/2016 30* 0 - 25 % Final    Comment: (NOTE)                                Negative:      0 - 25                                Borderline:   26 - 30                                Positive:         >30  Results for this test are for research purposes  only by the assay's . The performance  characteristics of this product have not been  established. Results should not be used as a  diagnostic procedure without confirmation of the  diagnosis by another medically established  diagnostic product or procedure. Performed At: [address] [id number]  Ilan Flaherty MD [id number]      AChR Modulating Ab 09/09/2016 23* 0 - 20 % Final    Comment: (NOTE)                               Negative:          <21                               Equivocal:     21 - 25                               Positive:          >25  The assay is linear between values of 12 and 64. Those <12 and >64 are reported as such.   No single  value for ACR-modulating antibody should be used as  a sole basis for diagnosis or response to therapy. Performed At: [address] [id number]  Bello Whittaker MD [id number]      Hemoglobin A1c 09/09/2016 5.6  4.2 - 6.3 % Final    Est. average glucose 09/09/2016 114  mg/dL Final    Comment: (NOTE)  The eAG should be interpreted with patient characteristics in mind   since ethnicity, interindividual differences, red cell lifespan,   variation in rates of glycation, etc. may affect the validity of the   calculation.  Sed rate, automated 09/09/2016 7  0 - 20 mm/hr Final    Sodium 09/10/2016 141  136 - 145 mmol/L Final    Potassium 09/10/2016 3.7  3.5 - 5.1 mmol/L Final    Chloride 09/10/2016 108  97 - 108 mmol/L Final    CO2 09/10/2016 25  21 - 32 mmol/L Final    Anion gap 09/10/2016 8  5 - 15 mmol/L Final    Glucose 09/10/2016 90  65 - 100 mg/dL Final    BUN 09/10/2016 25* 6 - 20 MG/DL Final    Creatinine 09/10/2016 1.07  0.70 - 1.30 MG/DL Final    BUN/Creatinine ratio 09/10/2016 23* 12 - 20   Final    GFR est AA 09/10/2016 >60  >60 ml/min/1.73m2 Final    GFR est non-AA 09/10/2016 >60  >60 ml/min/1.73m2 Final    Calcium 09/10/2016 8.5  8.5 - 10.1 MG/DL Final    Bilirubin, total 09/10/2016 0.9  0.2 - 1.0 MG/DL Final    ALT (SGPT) 09/10/2016 20  12 - 78 U/L Final    AST (SGOT) 09/10/2016 12* 15 - 37 U/L Final    Alk.  phosphatase 09/10/2016 30* 45 - 117 U/L Final    Protein, total 09/10/2016 5.9* 6.4 - 8.2 g/dL Final    Albumin 09/10/2016 3.4* 3.5 - 5.0 g/dL Final    Globulin 09/10/2016 2.5  2.0 - 4.0 g/dL Final    A-G Ratio 09/10/2016 1.4  1.1 - 2.2   Final    WBC 09/10/2016 10.7  4.1 - 11.1 K/uL Final    RBC 09/10/2016 4.16  4.10 - 5.70 M/uL Final    HGB 09/10/2016 12.1  12.1 - 17.0 g/dL Final    HCT 09/10/2016 37.2  36.6 - 50.3 % Final    MCV 09/10/2016 89.4  80.0 - 99.0 FL Final    MCH 09/10/2016 29.1  26.0 - 34.0 PG Final    MCHC 09/10/2016 32.5  30.0 - 36.5 g/dL Final    RDW 09/10/2016 14.9* 11.5 - 14.5 % Final    PLATELET 87/61/5399 331  150 - 400 K/uL Final    NEUTROPHILS 09/10/2016 70  32 - 75 % Final    LYMPHOCYTES 09/10/2016 19  12 - 49 % Final    MONOCYTES 09/10/2016 10  5 - 13 % Final    EOSINOPHILS 09/10/2016 1  0 - 7 % Final    BASOPHILS 09/10/2016 0  0 - 1 % Final    ABS. NEUTROPHILS 09/10/2016 7.5  1.8 - 8.0 K/UL Final    ABS. LYMPHOCYTES 09/10/2016 2.0  0.8 - 3.5 K/UL Final    ABS. MONOCYTES 09/10/2016 1.1* 0.0 - 1.0 K/UL Final    ABS. EOSINOPHILS 09/10/2016 0.1  0.0 - 0.4 K/UL Final    ABS. BASOPHILS 09/10/2016 0.0  0.0 - 0.1 K/UL Final    Magnesium 09/10/2016 2.1  1.6 - 2.4 mg/dL Final    Phosphorus 09/10/2016 3.5  2.6 - 4.7 MG/DL Final    TSH 09/10/2016 2.29  0.36 - 3.74 uIU/mL Final    Comment: (NOTE)  Due to TSH heterogeneity, both structurally and degree of   glycosylation, monoclonal antibodies used in the TSH assay may not   accurately quantitate TSH. Therefore, this result should be   correlated with clinical findings as well as with other assessments   of thyroid function, e.g., free T4, free T3.  Lyme Disease Ab, IgM, QT 09/10/2016 <0.80  0.00 - 0.79 index Final    Comment: (NOTE)                                 Negative         <0.80                                 Equivocal  0.80 - 1.19                                 Positive         >1.19  IgM levels may peak at 3-6 weeks post infection, then  gradually decline. Performed At: [address] [id number]  Ada Singletary MD PI:[id number]           Objective:       Exam:  Visit Vitals  /76 (BP 1 Location: Right arm, BP Patient Position: Sitting)   Pulse 99   Resp 16   Ht 5' 11\" (1.803 m)   Wt 114.3 kg (252 lb)   SpO2 99%   BMI 35.15 kg/m²     Gen: Awake, alert, follows commands  Appropriate appearance, normal speech. Oriented to all spheres.   No visual field defect on confrontation exam.  Full eyes movement, with no nystagmus, no diplopia, no ptosis. Normal gag and swallow. All remaining cranial nerves were normal  Motor function: 5/5 in all extremities  Sensory: intact to LT, PP and JPS  Good FTN and HTS   Gait: Normal    Assessment:       ICD-10-CM ICD-9-CM    1. Myasthenia gravis ([facility] [address] G70.00 358.00 mycophenolate (CELLCEPT) 500 mg tablet      CBC WITH AUTOMATED DIFF     MG ADL 7 (previously 3)      Plan:   1. Since patient continues to take fair amount of Prednisone and Mestinon, will start him on Cellcept 500 mg 1 tab BID  2. Will check CBC 1 week before follow up  3. Depending on above, will consider increasing Cellcept to 500 mg 2 tabs BID  4. Will need to follow up with dermatologist to monitor for skin CA (history of Basal Cell CA0  5. Continue Prednisone 20 mg QD. Can adjust by 10 mg accordingly with goal to keep at 10 to 20 mg every day. Advise to monitor BS and to discuss with PCP if BS remains high  6. Continue Mestinon 60 mg 3 to 7 times a day   7. GI and bone prophylaxis  8. FU with Dr Phillip White regarding thymectomy            Follow-up and Dispositions    · Return in about 1 month (around 11/22/2019).                Brendon Olivo MD  Diplomate, American Board of Psychiatry and Neurology  Diplomate, Neuromuscular Medicine  Diplomate, American Board of Electrodiagnostic Medicine

## 2019-10-22 NOTE — LETTER
10/22/19 Patient: Cole Null YOB: 1955 Date of Visit: 10/22/2019 Nena Harley MD 
[facility] [address] [facility] [address] [address] VIA Facsimile: [phone] Dear Nena Harley MD, Thank you for referring Mr. Quevedo Born to [facility] for evaluation. My notes for this consultation are attached. If you have questions, please do not hesitate to call me. I look forward to following your patient along with you. Sincerely, Jose Giles MD

## 2019-10-22 NOTE — PATIENT INSTRUCTIONS
[facility]   Statement to Patients  April 1, 2014      In an effort to ensure the large volume of patient prescription refills is processed in the most efficient and expeditious manner, we are asking our patients to assist us by calling your Pharmacy for all prescription refills, this will include also your  Mail Order Pharmacy. The pharmacy will contact our office electronically to continue the refill process. Please do not wait until the last minute to call your pharmacy. We need at least 48 hours (2days) to fill prescriptions. We also encourage you to call your pharmacy before going to  your prescription to make sure it is ready. With regard to controlled substance prescription refill requests (narcotic refills) that need to be picked up at our office, we ask your cooperation by providing us with at least 72 hours (3days) notice that you will need a refill. We will not refill narcotic prescription refill requests after 4:00pm on any weekday, Monday through Thursday, or after 2:00pm on Fridays, or on the weekends. We encourage everyone to explore another way of getting your prescription refill request processed using [employer], our patient web portal through our electronic medical record system. [employer] is an efficient and effective way to communicate your medication request directly to the office and  downloadable as an sparkle on your smart phone . [employer] also features a review functionality that allows you to view your medication list as well as leave messages for your physician. Are you ready to get connected? If so please review the attatched instructions or speak to any of our staff to get you set up right away! Thank you so much for your cooperation. Should you have any questions please contact our Practice Administrator.     The Physicians and Staff,  [facility]

## 2019-10-22 NOTE — PROGRESS NOTES
Visit Vitals  /76 (BP 1 Location: Right arm, BP Patient Position: Sitting)   Pulse 99   Resp 16   Ht 5' 11\" (1.803 m)   Wt 114.3 kg (252 lb)   SpO2 99%   BMI 35.15 kg/m²     Chief Complaint   Patient presents with    Other     Myasthenia gravis/ dry eyes/ Right swelling under eye has increased

## 2019-11-25 ENCOUNTER — TELEPHONE (OUTPATIENT)
Dept: NEUROLOGY | Age: 64
End: 2019-11-25

## 2019-11-25 NOTE — TELEPHONE ENCOUNTER
Called and spoke with patient. He was seen by the thoracic surgeon and is scheduling surgery. His pre-op appt is on 12/12/19. He thinks he needs to stop the CellCept before surgery. Is this correct and if so, does he have to be weaned?

## 2019-11-25 NOTE — TELEPHONE ENCOUNTER
Pt stated that he would like a call back from the nurse   Pt is have surgery beginning of next year  and want to speak to the nurse regarding reducing  medication   mycophenolate (CELLCEPT) 500 mg tablet

## 2019-11-25 NOTE — TELEPHONE ENCOUNTER
Per Dr. Ortiz Fam:    Can Durham MD  [facility], April M, LPN   Caller: Unspecified (Today, 11:49 AM)             No need to stop Cellcept      Called and left a detailed message stating MDs recommendations.

## 2019-11-27 DIAGNOSIS — G70.00 OCULAR MYASTHENIA GRAVIS (HCC): ICD-10-CM

## 2019-11-27 RX ORDER — PYRIDOSTIGMINE BROMIDE 60 MG/1
TABLET ORAL
Qty: 700 TAB | Refills: 3 | Status: SHIPPED | OUTPATIENT
Start: 2019-11-27 | End: 2019-12-31 | Stop reason: SDUPTHER

## 2019-12-05 ENCOUNTER — TELEPHONE (OUTPATIENT)
Dept: NEUROLOGY | Age: 64
End: 2019-12-05

## 2019-12-05 NOTE — TELEPHONE ENCOUNTER
Called and rescheduled patient's appt from 12/23/2019 to 1/21/2020. He said he was told to come in 4 weeks after starting Cellcept. Is there anywhere else he can be scheduled?

## 2019-12-20 ENCOUNTER — OFFICE VISIT (OUTPATIENT)
Dept: NEUROLOGY | Age: 64
End: 2019-12-20

## 2019-12-20 VITALS
HEIGHT: 71 IN | WEIGHT: 252 LBS | OXYGEN SATURATION: 98 % | BODY MASS INDEX: 35.28 KG/M2 | DIASTOLIC BLOOD PRESSURE: 60 MMHG | SYSTOLIC BLOOD PRESSURE: 118 MMHG | HEART RATE: 76 BPM | TEMPERATURE: 98.6 F | RESPIRATION RATE: 16 BRPM

## 2019-12-20 DIAGNOSIS — G70.00 MYASTHENIA GRAVIS (HCC): Primary | ICD-10-CM

## 2019-12-20 RX ORDER — MYCOPHENOLATE MOFETIL 500 MG/1
500 TABLET ORAL 2 TIMES DAILY
Qty: 360 TAB | Refills: 3 | Status: SHIPPED | OUTPATIENT
Start: 2019-12-20 | End: 2019-12-20 | Stop reason: DRUGHIGH

## 2019-12-20 RX ORDER — MYCOPHENOLATE MOFETIL 500 MG/1
1000 TABLET ORAL 2 TIMES DAILY
Qty: 360 TAB | Refills: 3 | Status: SHIPPED | OUTPATIENT
Start: 2019-12-20 | End: 2019-12-31 | Stop reason: SDUPTHER

## 2019-12-20 NOTE — PROGRESS NOTES
Neurology Progress Note    Patient ID:  Sarahy Garcia  [id number]  31 y.o.  1955      Subjective:   History:  Mr. Sarahy Garcia is a  Male with Asthma; H/O seasonal allergies; Hyperlipemia; and Hypertension who was admitted at [facility] for [address] started last 8/6/16, noticeable on R gaze, associated with R eye discomfort. Patient (+) AChR Abs. CT chest negative for thymoma. His allergy to ragweed can cause flare ups described as increased blurred vision and has to adjust his Prednisone. Patient has started Cellcept 500 mg BID with less bouts of diplopia. Now on Prednisone 10 mg every day. Tried to stop Prednisone, but note increased diplopia and joint pains. Takes Mestinon 4-5 times/ day. ROS:  Per HPI-  Otherwise the remainder of ROS was negative    Social Hx  Social History     Socioeconomic History    Marital status:      Spouse name: Not on file    Number of children: Not on file    Years of education: Not on file    Highest education level: Not on file   Tobacco Use    Smoking status: Former Smoker    Smokeless tobacco: Never Used   Substance and Sexual Activity    Alcohol use: No       Meds:  Current Outpatient Medications on File Prior to Visit   Medication Sig Dispense Refill    pyridostigmine (MESTINON) 60 mg tablet TAKE 1 TABLET BY MOUTH BEFORE MEALS, AFTER MEALS, AND AT BEDTIME 700 Tab 3    predniSONE (DELTASONE) 10 mg tablet Take 4 Tabs by mouth daily. (Patient taking differently: Take 10 mg by mouth daily.) 360 Tab 3    amitriptyline (ELAVIL) 10 mg tablet Take 10 mg by mouth daily. 3    finasteride (PROSCAR) 5 mg tablet Take 5 mg by mouth daily. 3    gabapentin (NEURONTIN) 600 mg tablet Take  by mouth daily as needed.  furosemide (LASIX) 20 mg tablet Take 20 mg by mouth daily.  fluticasone (FLONASE) 50 mcg/actuation nasal spray 2 Sprays by Both Nostrils route daily as needed.  montelukast (SINGULAIR) 10 mg tablet Take 10 mg by mouth daily.  levocetirizine (XYZAL) 5 mg tablet Take 5 mg by mouth daily.  albuterol (PROVENTIL HFA) 90 mcg/actuation inhaler Take 2 Puffs by inhalation every four (4) hours as needed.  aspirin delayed-release 81 mg tablet Take  by mouth daily.  cholecalciferol (VITAMIN D3) 1,000 unit cap Take 1,000 Units by mouth daily.  amLODIPine-atorvastatin (CADUET) 10-20 mg per tablet Take 1 Tab by mouth nightly.  irbesartan (AVAPRO) 300 mg tablet Take 300 mg by mouth nightly.  fenofibric acid (TRILIPIX ER) 135 mg capsule Take 135 mg by mouth nightly.  niacin-inositol (NIACIN FLUSH FREE) 400 mg niacin (500 mg) cap Take 1 Cap by mouth nightly.  potassium chloride SR (KLOR-CON 10) 10 mEq tablet Take 10 mEq by mouth two (2) times a day.  fluticasone-salmeterol (ADVAIR DISKUS) 250-50 mcg/dose diskus inhaler Take 1 Puff by inhalation every twelve (12) hours.  predniSONE (DELTASONE) 10 mg tablet TAKE 4 TABLETS BY MOUTH DAILY 360 Tab 3    tamsulosin (FLOMAX) 0.4 mg capsule Take 0.4 mg by mouth as needed. No current facility-administered medications on file prior to visit. Imaging:    CT Results (recent):  Results from Hospital Encounter encounter on 05/24/19   CT CHEST W CONT    Narrative Indication: Myasthenia gravis evaluate for thymoma. COMPARISON: 10/11/2016    Multiple axial images were obtained from the lung apices to the adrenal glands  following the uneventful administration of 100 mL of Isovue 300. Images  reformatted in sagittal coronal plane. CT dose reduction was achieved through  use of a standardized protocol tailored for this examination and automatic  exposure control for dose modulation. The thyroid gland is unremarkable. No axillary adenopathy. There is no mediastinal or hilar adenopathy. No evidence for anterior  mediastinal mass or thymoma. Small pericardial effusion has decreased. No  pleural effusions. The lungs are clear of an acute process.  The 3 mm pulmonary nodule in the left  upper lobe with smooth margins is stable. No new pulmonary nodules. Review of bone windows is unremarkable. Incidental imaging through the upper abdomen reveals a new oval-shaped  low-density possibly cystic lesion adjacent to the pancreas and possibly arising  from the pancreas. This projects just inferior to the stomach as well. Exact  origin of this is uncertain. This was not present previously. This measures 2.2  x 1.6 cm. Further evaluation is suggested. MRI of the abdomen is more  information. In the anterior aspect of the spleen is a new rounded hypodensity measuring 8  mm. This was not present previously. Posteriorly in the spleen is a small  hypodensity that has not changed significantly when compared to previous exam.    No adrenal lesions. Impression 1. No evidence for thymoma  2. Stable pulmonary nodule in the left upper lobe. 3. There is a new low density probably cystic lesion in the upper abdomen  adjacent to the pancreas and stomach. MRI of the pancreas is suggested. Exact  origin of this new lesion is uncertain. 4. New small hypodensity in the spleen too small to characterize. 23X           MRI Results (recent):  Results from Hospital Encounter encounter on 06/18/19   MRI ABD W WO CONT    Narrative EXAM:  MRI ABD W WO CONT    INDICATION:   Cystic mass of pancreas    COMPARISON: None. CONTRAST:  20 mL Gadavist.    TECHNIQUE:   MR of the abdomen was performed and the following sequences were obtained:  Coronal HASTE, multiplanar MRCP, axial in and out-of-phase T1, axial T1  fat-saturated, axial T2, and pre- and post contrast T1-weighted images. FINDINGS:  The liver is normal. There is a 5 mm cyst in segment 2 . Patient status post  cholecystectomy. The spleen is normal. There are 2 tiny cysts measuring 5 mm  and 6 mm. .  The pancreatic duct is normal. There is a 4 x 2 mm cyst in the pancreatic  head.  There is a 1.5 x 0.6 cm cyst in pancreatic neck. There is a 2.1 x 1.5 cm  cyst projected off of the pancreatic body superiorly. There is a 1.1 x 0.3 cm  cyst in the pancreatic body inferiorly. There is a 0.7 cm cyst in pancreatic  tail. .  The adrenal glands are normal.  The kidneys are normal. There is a 2.2 x 1.2  cm left renal cyst    There is no intra or extrahepatic biliary ductal dilatation. No biliary  strictures or filling defects are seen. There is no pancreas divisum. There is no lymphadenopathy or ascites. Impression IMPRESSION:    1. There are several cysts in the pancreas as described above with the largest  measuring 2.1 x 1.5 cm projected off the pancreatic body superiorly and these  may represent sidebranch IP N. .. No definite enhancing lesions are identified  within the pancreas. . Follow-up MR in 6-12 months would be helpful to assess for  interval change. 23X             IR Results (recent):  No results found for this or any previous visit. VAS/US Results (recent):  No results found for this or any previous visit. Reviewed records in [facility] tab today    Lab Review     Telephone on 10/24/2019   Component Date Value Ref Range Status    WBC 10/24/2019 7.5  3.4 - 10.8 x10E3/uL Final    RBC 10/24/2019 4.39  4.14 - 5.80 x10E6/uL Final    HGB 10/24/2019 13.1  13.0 - 17.7 g/dL Final    HCT 10/24/2019 38.7  37.5 - 51.0 % Final    MCV 10/24/2019 88  79 - 97 fL Final    MCH 10/24/2019 29.8  26.6 - 33.0 pg Final    MCHC 10/24/2019 33.9  31.5 - 35.7 g/dL Final    RDW 10/24/2019 14.6  12.3 - 15.4 % Final    PLATELET 85/90/9305 092  150 - 450 x10E3/uL Final    NEUTROPHILS 10/24/2019 82  Not Estab. % Final    Comment: Occasional myelocyte seen on scanning. Occasional metamyelocyte seen on scan.  Lymphocytes 10/24/2019 14  Not Estab. % Final    MONOCYTES 10/24/2019 4  Not Estab. % Final    EOSINOPHILS 10/24/2019 0  Not Estab. % Final    BASOPHILS 10/24/2019 0  Not Estab. % Final    ABS. NEUTROPHILS 10/24/2019 6.2  1.4 - 7.0 x10E3/uL Final    Abs Lymphocytes 10/24/2019 1.1  0.7 - 3.1 x10E3/uL Final    ABS. MONOCYTES 10/24/2019 0.3  0.1 - 0.9 x10E3/uL Final    ABS. EOSINOPHILS 10/24/2019 0.0  0.0 - 0.4 x10E3/uL Final    ABS. BASOPHILS 10/24/2019 0.0  0.0 - 0.2 x10E3/uL Final    Hematology comments: 10/24/2019 Note:   Final    Verified by microscopic examination. Objective:       Exam:  Visit Vitals  /60 (BP 1 Location: Right arm, BP Patient Position: Sitting)   Pulse 76   Temp 98.6 °F (37 °C) (Oral)   Resp 16   Ht 5' 11\" (1.803 m)   Wt 114.3 kg (252 lb)   SpO2 98%   BMI 35.15 kg/m²     Gen: Awake, alert, follows commands  Appropriate appearance, normal speech. Oriented to all spheres. No visual field defect on confrontation exam.  Full eyes movement, with no nystagmus, no diplopia, no ptosis. Normal gag and swallow. All remaining cranial nerves were normal  Motor function: 5/5 in all extremities  Sensory: intact to LT, PP and JPS  Good FTN and HTS   Gait: Normal    Assessment:       ICD-10-CM ICD-9-CM    1. Myasthenia gravis ([facility] G70.00 358.00 CBC WITH AUTOMATED DIFF       MG ADL 2 (previously 7)        Plan:   1. Increase Cellcept 500 mg 2 tabs BID  2. Will check CBC 1 week before follow up  3. Will need to follow up with dermatologist to monitor for skin CA (history of Basal Cell CA0  4. Continue Prednisone 10 mg QD.   5. Continue Mestinon 60 mg 3 to 7 times a day   6. GI and bone prophylaxis  7. FU with Dr Neno Martin regarding thymectomy on Glynn 3, 2020        Follow-up and Dispositions    · Return in about 3 months (around 3/20/2020).                Ava Warren MD  Diplomate, American Board of Psychiatry and Neurology  Diplomate, Neuromuscular Medicine  Diplomate, American Board of Electrodiagnostic Medicine

## 2019-12-20 NOTE — LETTER
12/20/19 Patient: Cole Null YOB: 1955 Date of Visit: 12/20/2019 Nena Harley MD 
[facility] [address] [facility] [address] [id number] VIA Facsimile: [phone] Dear Nena Harley MD, Thank you for referring Mr. Quevedo Born to [facility] for evaluation. My notes for this consultation are attached. If you have questions, please do not hesitate to call me. I look forward to following your patient along with you. Sincerely, Jose Giles MD

## 2019-12-20 NOTE — PROGRESS NOTES
Chief Complaint   Patient presents with    Follow-up     Patient is here today to follow up for MG. States he only has eye symptoms and fatigue.      Visit Vitals  /60 (BP 1 Location: Right arm, BP Patient Position: Sitting)   Pulse 76   Temp 98.6 °F (37 °C) (Oral)   Resp 16   Ht 5' 11\" (1.803 m)   Wt 114.3 kg (252 lb)   SpO2 98%   BMI 35.15 kg/m²

## 2019-12-31 DIAGNOSIS — G70.00 MYASTHENIA GRAVIS (HCC): Primary | ICD-10-CM

## 2019-12-31 DIAGNOSIS — G70.00 OCULAR MYASTHENIA GRAVIS (HCC): ICD-10-CM

## 2019-12-31 RX ORDER — MYCOPHENOLATE MOFETIL 500 MG/1
1000 TABLET ORAL 2 TIMES DAILY
Qty: 360 TAB | Refills: 1 | Status: SHIPPED | OUTPATIENT
Start: 2019-12-31 | End: 2020-02-11 | Stop reason: SDUPTHER

## 2019-12-31 RX ORDER — PYRIDOSTIGMINE BROMIDE 60 MG/1
TABLET ORAL
Qty: 630 TAB | Refills: 1 | Status: SHIPPED | OUTPATIENT
Start: 2019-12-31 | End: 2020-01-06 | Stop reason: SDUPTHER

## 2019-12-31 RX ORDER — PREDNISONE 10 MG/1
10 TABLET ORAL DAILY
Qty: 90 TAB | Refills: 1 | Status: SHIPPED | OUTPATIENT
Start: 2019-12-31 | End: 2020-03-30

## 2020-01-06 DIAGNOSIS — G70.00 OCULAR MYASTHENIA GRAVIS (HCC): ICD-10-CM

## 2020-01-06 DIAGNOSIS — G70.00 MYASTHENIA GRAVIS (HCC): ICD-10-CM

## 2020-01-06 RX ORDER — PYRIDOSTIGMINE BROMIDE 60 MG/1
TABLET ORAL
Qty: 630 TAB | Refills: 1 | Status: SHIPPED | OUTPATIENT
Start: 2020-01-06 | End: 2020-06-08

## 2020-01-10 ENCOUNTER — TELEPHONE (OUTPATIENT)
Dept: NEUROLOGY | Age: 65
End: 2020-01-10

## 2020-01-10 NOTE — TELEPHONE ENCOUNTER
----- Message from Onelia Mendez sent at 1/10/2020 10:20 AM EST -----  Regarding: Dr. Lucas West Message/Vendor Calls    Caller's first and last name: Freddie Botello      Reason for call: Verify Rx \"Mestinon\"      Callback required yes/no and why: yes      Best contact number(s): [phone]       Details to clarify the request:      Onelia Mendez

## 2020-01-10 NOTE — TELEPHONE ENCOUNTER
Called and spoke with Rubén Cesar at [facility] and authed ok to fill patient's Mestinon that was transferred from another pharmacy. Read off rx sig and refills.

## 2020-01-23 ENCOUNTER — TELEPHONE (OUTPATIENT)
Dept: NEUROLOGY | Age: 65
End: 2020-01-23

## 2020-01-23 NOTE — TELEPHONE ENCOUNTER
Pt stated that he would like call back from the nurse regarding the medication   mycophenolate (CELLCEPT) 500 mg tablet

## 2020-01-24 NOTE — TELEPHONE ENCOUNTER
Called patient back. He stated since 1/1/20 his new mail order/specialty pharm is [facility]. They need me to call and give authorization for his Cellcept 500mg tab-2 tabs two times a day #360 with 1 refill. I called # patient gave me ([phone]) and spoke with PA dept she said both his Cellcept and Mestinon were already approved and she can see the claims went through. I double checked the sig and dose and they were correct.

## 2020-02-10 ENCOUNTER — TELEPHONE (OUTPATIENT)
Dept: NEUROLOGY | Age: 65
End: 2020-02-10

## 2020-02-10 DIAGNOSIS — G70.00 MYASTHENIA GRAVIS (HCC): ICD-10-CM

## 2020-02-10 DIAGNOSIS — G70.00 OCULAR MYASTHENIA GRAVIS (HCC): ICD-10-CM

## 2020-02-11 RX ORDER — MYCOPHENOLATE MOFETIL 500 MG/1
1000 TABLET ORAL 2 TIMES DAILY
Qty: 360 TAB | Refills: 1 | Status: SHIPPED | OUTPATIENT
Start: 2020-02-11 | End: 2020-06-22

## 2020-03-19 ENCOUNTER — HOSPITAL ENCOUNTER (OUTPATIENT)
Dept: MRI IMAGING | Age: 65
Discharge: HOME | End: 2020-03-19
Attending: FAMILY MEDICINE
Payer: MEDICARE

## 2020-03-19 DIAGNOSIS — K86.2 CYST OF PANCREAS: ICD-10-CM

## 2020-03-19 PROCEDURE — 74011250636 HC RX REV CODE- 250/636: Performed by: RADIOLOGY

## 2020-03-19 PROCEDURE — 74183 MRI ABD W/O CNTR FLWD CNTR: CPT

## 2020-03-19 PROCEDURE — A9585 GADOBUTROL INJECTION: HCPCS | Performed by: RADIOLOGY

## 2020-03-19 RX ADMIN — GADOBUTROL 10 ML: 604.72 INJECTION INTRAVENOUS at 12:55

## 2020-04-20 ENCOUNTER — VIRTUAL VISIT (OUTPATIENT)
Dept: NEUROLOGY | Age: 65
End: 2020-04-20

## 2020-04-20 DIAGNOSIS — G70.00 MYASTHENIA GRAVIS (HCC): Primary | ICD-10-CM

## 2020-04-21 ENCOUNTER — TELEPHONE (OUTPATIENT)
Dept: NEUROLOGY | Age: 65
End: 2020-04-21

## 2020-04-21 NOTE — TELEPHONE ENCOUNTER
Called and left a detailed message on patient's home # that after his virtual visit yesterday, I mailed him the lab slip for his CBC and that Dr. Bryon Burnham wants him to f/u in 3 months in the office so to call back and schedule.

## 2020-06-05 DIAGNOSIS — G70.00 MYASTHENIA GRAVIS (HCC): ICD-10-CM

## 2020-06-05 DIAGNOSIS — G70.00 OCULAR MYASTHENIA GRAVIS (HCC): ICD-10-CM

## 2020-06-08 RX ORDER — PYRIDOSTIGMINE BROMIDE 60 MG/1
TABLET ORAL
Qty: 630 TAB | Refills: 3 | Status: SHIPPED | OUTPATIENT
Start: 2020-06-08 | End: 2021-06-03

## 2020-06-09 ENCOUNTER — TELEPHONE (OUTPATIENT)
Dept: NEUROLOGY | Age: 65
End: 2020-06-09

## 2020-06-09 NOTE — TELEPHONE ENCOUNTER
----- Message from Jayashree Duron sent at 6/8/2020  3:47 PM EDT -----  Regarding: Dr Cher Persaud  Patient return call    Caller's first and last name and relationship (if not the patient):      Best contact number(s):[phone]      Whose call is being returned: nurse      Details to clarify the request: regarding scheduling a follow up appt from [facility] 04/20/20      Jayashree Duron

## 2020-06-22 DIAGNOSIS — G70.00 MYASTHENIA GRAVIS (HCC): ICD-10-CM

## 2020-06-22 DIAGNOSIS — G70.00 OCULAR MYASTHENIA GRAVIS (HCC): ICD-10-CM

## 2020-06-22 RX ORDER — MYCOPHENOLATE MOFETIL 500 MG/1
TABLET ORAL
Qty: 360 TAB | Refills: 1 | Status: SHIPPED | OUTPATIENT
Start: 2020-06-22 | End: 2020-12-08 | Stop reason: SDUPTHER

## 2020-07-22 ENCOUNTER — VIRTUAL VISIT (OUTPATIENT)
Dept: NEUROLOGY | Age: 65
End: 2020-07-22

## 2020-07-22 DIAGNOSIS — G70.00 MYASTHENIA GRAVIS (HCC): Primary | ICD-10-CM

## 2020-07-22 NOTE — LETTER
7/22/2020 1:34 PM 
 
Patient:  Javier García YOB: 1955 Date of Visit: 7/22/2020 Dear Cb Hebert MD 
[facility] [place] [address] [id number] VIA Facsimile: [phone]: Thank you for referring Mr. Irineo Hammond to me for evaluation/treatment. Below are the relevant portions of my assessment and plan of care. If you have questions, please do not hesitate to call me. I look forward to following Jessika Jefferson Cole along with you. Sincerely, Yina Hernandez MD

## 2020-07-22 NOTE — PROGRESS NOTES
Gordon Regalado is a 77 y.o. male who was seen by synchronous (real-time) audio-video technology on 7/22/2020. Subjective:   Mr. Gordon Regalado is a  Male with Asthma; H/O seasonal allergies; Hyperlipemia; and Hypertension who was admitted at [facility] for [address] started last 8/6/16, noticeable on R gaze, associated with R eye discomfort. Patient (+) AChR Abs. CT chest negative for thymoma. His allergy to ragweed can cause flare ups described as increased blurred vision and has to adjust his Prednisone. On Glynn 3, 2020, patient had open thymectomy (due to large thymus) complicated with pneumothorax. Patient is doing well on Cellcept 500 mg 2 tabs BID and Prednisone 10 mg every day. Will take Mestinon 4 times/ day. ROS:  Per HPI-  Otherwise 12 point ROS was negative    Social Hx:  Social History     Socioeconomic History    Marital status:      Spouse name: Not on file    Number of children: Not on file    Years of education: Not on file    Highest education level: Not on file   Tobacco Use    Smoking status: Former Smoker    Smokeless tobacco: Never Used   Substance and Sexual Activity    Alcohol use: No       Meds:  Current Outpatient Medications on File Prior to Visit   Medication Sig Dispense Refill    mycophenolate (CELLCEPT) 500 mg tablet TAKE 2 TABLETS BY MOUTH TWICE DAILY 360 Tab 1    pyridostigmine (MESTINON) 60 mg tablet TAKE 1 TABLET BEFORE MEALS, AFTER MEALS AND AT BEDTIME (MAY TAKE UP TO 7 TIMES DAILY) 630 Tab 3    predniSONE (DELTASONE) 10 mg tablet TAKE 4 TABLETS BY MOUTH DAILY 360 Tab 3    amitriptyline (ELAVIL) 10 mg tablet Take 10 mg by mouth daily. 3    finasteride (PROSCAR) 5 mg tablet Take 5 mg by mouth daily. 3    gabapentin (NEURONTIN) 600 mg tablet Take  by mouth daily as needed.  tamsulosin (FLOMAX) 0.4 mg capsule Take 0.4 mg by mouth as needed.  furosemide (LASIX) 20 mg tablet Take 20 mg by mouth daily.       fluticasone (FLONASE) 50 mcg/actuation nasal spray 2 Sprays by Both Nostrils route daily as needed.  montelukast (SINGULAIR) 10 mg tablet Take 10 mg by mouth daily.  levocetirizine (XYZAL) 5 mg tablet Take 5 mg by mouth daily.  albuterol (PROVENTIL HFA) 90 mcg/actuation inhaler Take 2 Puffs by inhalation every four (4) hours as needed.  aspirin delayed-release 81 mg tablet Take  by mouth daily.  cholecalciferol (VITAMIN D3) 1,000 unit cap Take 1,000 Units by mouth daily.  amLODIPine-atorvastatin (CADUET) 10-20 mg per tablet Take 1 Tab by mouth nightly.  irbesartan (AVAPRO) 300 mg tablet Take 300 mg by mouth nightly.  fenofibric acid (TRILIPIX ER) 135 mg capsule Take 135 mg by mouth nightly.  niacin-inositol (NIACIN FLUSH FREE) 400 mg niacin (500 mg) cap Take 1 Cap by mouth nightly.  potassium chloride SR (KLOR-CON 10) 10 mEq tablet Take 10 mEq by mouth two (2) times a day.  fluticasone-salmeterol (ADVAIR DISKUS) 250-50 mcg/dose diskus inhaler Take 1 Puff by inhalation every twelve (12) hours. No current facility-administered medications on file prior to visit. Imaging:    CT Results (recent):  Results from Hospital Encounter encounter on 05/24/19   CT CHEST W CONT    Narrative Indication: Myasthenia gravis evaluate for thymoma. COMPARISON: 10/11/2016    Multiple axial images were obtained from the lung apices to the adrenal glands  following the uneventful administration of 100 mL of Isovue 300. Images  reformatted in sagittal coronal plane. CT dose reduction was achieved through  use of a standardized protocol tailored for this examination and automatic  exposure control for dose modulation. The thyroid gland is unremarkable. No axillary adenopathy. There is no mediastinal or hilar adenopathy. No evidence for anterior  mediastinal mass or thymoma. Small pericardial effusion has decreased. No  pleural effusions. The lungs are clear of an acute process.  The 3 mm pulmonary nodule in the left  upper lobe with smooth margins is stable. No new pulmonary nodules. Review of bone windows is unremarkable. Incidental imaging through the upper abdomen reveals a new oval-shaped  low-density possibly cystic lesion adjacent to the pancreas and possibly arising  from the pancreas. This projects just inferior to the stomach as well. Exact  origin of this is uncertain. This was not present previously. This measures 2.2  x 1.6 cm. Further evaluation is suggested. MRI of the abdomen is more  information. In the anterior aspect of the spleen is a new rounded hypodensity measuring 8  mm. This was not present previously. Posteriorly in the spleen is a small  hypodensity that has not changed significantly when compared to previous exam.    No adrenal lesions. Impression 1. No evidence for thymoma  2. Stable pulmonary nodule in the left upper lobe. 3. There is a new low density probably cystic lesion in the upper abdomen  adjacent to the pancreas and stomach. MRI of the pancreas is suggested. Exact  origin of this new lesion is uncertain. 4. New small hypodensity in the spleen too small to characterize. 23X           MRI Results (recent):  Results from [place] encounter on 03/19/20   MRI ABD W WO CONT    Narrative EXAM:  MRI ABD W WO CONT    INDICATION:   Follow-up pancreatic cyst    COMPARISON: 6/18/2019    CONTRAST:  10 mL of Gadavist.    TECHNIQUE:   MR of the abdomen was performed and the following sequences were obtained:  Coronal HASTE, multiplanar MRCP, axial in and out-of-phase T1, axial T1  fat-saturated, axial T2, and pre- and post contrast T1-weighted images. FINDINGS:  The liver is normal. There is a 5 mm cyst in segment 2 . Patient status post  cholecystectomy. The spleen is normal. There are 2 tiny cysts measuring 5 mm  and 6 mm. There is no pancreatic ductal dilatation. . There is a 4 x 2 mm cyst in the  pancreatic head.  There is a 1.5 x 0.6 cm cyst in pancreatic neck. There is a 2.1  x 1.7 cm cyst projected off of the pancreatic body superiorly. There is a 1.1 x  0.3 cm cyst in the pancreatic body inferiorly. There is a 1.0 x 0.7cm cyst in  pancreatic tail. There is no adrenal lesion. .  The kidneys are without evidence of mass lesion or  hydronephrosis. . Right renal cyst.    There is no intra or extrahepatic biliary ductal dilatation. No biliary  strictures or filling defects are seen. There is no pancreas divisum. There is no lymphadenopathy or ascites. Impression IMPRESSION:    Stable scattered pancreatic T2 hyperdense foci the largest measuring 2.1 x 1.7  cm projected off the pancreatic body superiorly and most likely represent side  branch type IPMN. The remainder of the examination is also stable. No acute process is identified. Follow-up imaging in 12-24 months is suggested. IR Results (recent):  No results found for this or any previous visit. VAS/US Results (recent):  No results found for this or any previous visit. Reviewed records in [facility] [employer] [facility] tab today    Lab Review     No visits with results within 3 Month(s) from this visit. Latest known visit with results is:   Telephone on 10/24/2019   Component Date Value Ref Range Status    WBC 10/24/2019 7.5  3.4 - 10.8 x10E3/uL Final    RBC 10/24/2019 4.39  4.14 - 5.80 x10E6/uL Final    HGB 10/24/2019 13.1  13.0 - 17.7 g/dL Final    HCT 10/24/2019 38.7  37.5 - 51.0 % Final    MCV 10/24/2019 88  79 - 97 fL Final    MCH 10/24/2019 29.8  26.6 - 33.0 pg Final    MCHC 10/24/2019 33.9  31.5 - 35.7 g/dL Final    RDW 10/24/2019 14.6  12.3 - 15.4 % Final    PLATELET 05/13/4695 045  150 - 450 x10E3/uL Final    NEUTROPHILS 10/24/2019 82  Not Estab. % Final    Comment: Occasional myelocyte seen on scanning. Occasional metamyelocyte seen on scan.       Lymphocytes 10/24/2019 14  Not Estab. % Final    MONOCYTES 10/24/2019 4  Not Estab. % Final    EOSINOPHILS 10/24/2019 0  Not Estab. % Final    BASOPHILS 10/24/2019 0  Not Estab. % Final    ABS. NEUTROPHILS 10/24/2019 6.2  1.4 - 7.0 x10E3/uL Final    Abs Lymphocytes 10/24/2019 1.1  0.7 - 3.1 x10E3/uL Final    ABS. MONOCYTES 10/24/2019 0.3  0.1 - 0.9 x10E3/uL Final    ABS. EOSINOPHILS 10/24/2019 0.0  0.0 - 0.4 x10E3/uL Final    ABS. BASOPHILS 10/24/2019 0.0  0.0 - 0.2 x10E3/uL Final    Hematology comments: 10/24/2019 Note:   Final    Verified by microscopic examination. Objective:     General: alert, cooperative, no distress   Mental  status: mental status: alert, oriented to person, place, and time, normal mood, behavior, speech, dress, motor activity, and thought processes   Resp: resp: normal effort and no respiratory distress   Neuro: neuro: no gross deficits   Skin: skin: no discoloration or lesions of concern on visible areas     Due to this being a TeleHealth evaluation, many elements of the physical examination are unable to be assessed. Assessment:       ICD-10-CM ICD-9-CM    1. My[facility]henia gravis ([facility]  G70.00 358.00      Oculopharyngeal     (+) AChR Abs. S/p thymectomy      Plan:   1. Discussed reducing Prednisone and patient agrees. Will reduce to 5 mg every day. Patient to call for worsening  2. Conitnue Cellcept 500 mg 2 tabs BID  3. Will need to follow up with dermatologist to monitor for skin CA (history of Basal Cell CA0  4. Continue Mestinon 60 mg 4 times a day   5. GI and bone prophylaxis      Follow-up and Dispositions    · Return in about 3 months (around 10/22/2020). CPT Codes 41295-59409 for Established Patients may apply to this Telehealth Visit      We discussed the expected course, resolution and complications of the diagnosis(es) in detail. Medication risks, benefits, costs, interactions, and alternatives were discussed as indicated. I advised him to contact the office if his condition worsens, changes or fails to improve as anticipated.  He expressed understanding with the diagnosis(es) and plan. Pursuant to the emergency declaration under the [facility], [facility] waiver authority and the [employer] and [employer] Act, this Virtual  Visit was conducted, with patient's consent, to reduce the patient's risk of exposure to COVID-19 and provide continuity of care for an established patient. Services were provided through a video synchronous discussion virtually to substitute for in-person clinic visit.        Himanshu Davenport MD  Diplomate, American Board of Psychiatry and Neurology  Diplomate, Neuromuscular Medicine  Diplomate, American Board of Electrodiagnostic Medicine

## 2020-08-17 ENCOUNTER — HOSPITAL ENCOUNTER (OUTPATIENT)
Dept: LAB | Age: 65
Discharge: HOME | End: 2020-08-17
Payer: MEDICARE

## 2020-08-17 DIAGNOSIS — U07.1 COVID-19: ICD-10-CM

## 2020-08-17 PROCEDURE — 87635 SARS-COV-2 COVID-19 AMP PRB: CPT

## 2020-08-18 LAB — SARS-COV-2, COV2NT: NOT DETECTED

## 2020-08-19 NOTE — PROGRESS NOTES
[address]  Endoscopy Preprocedure Instructions      1. On the day of your procedure, please report to registration located on the 2nd floor of the  [facility]. yes    2. You must have a responsible adult to drive you to the hospital, stay at the hospital during your procedure and drive you home. If they leave your procedure will not be started (no exceptions). yes    3. Do not have anything to eat or drink (including water, gum, mints, coffee, and juice) after midnight. This does not apply to the medications you were instructed to take by your physician. yesIf you are currently taking Plavix, Coumadin, Aspirin, or other blood-thinning agents, contact your physician for special instructions. yes    4. If you are having a procedure that requires bowel prep: We recommend that you have only clear liquids the day before your procedure and begin your bowel prep by 4:00 pm.  You may continue to drink clear liquids until midnight. If for any reason you are not able to complete your prep please notify your physician immediately. yes    5. Have a list of all current medications, including vitamins, herbal supplements and any other over the counter medications. yes    6. If you wear glasses, contacts, dentures and/or hearing aids, they may be removed prior to procedure, please bring a case to store them in. yes    7. You should understand that if you do not follow these instructions your procedure may be cancelled. If your physical condition changes (I.e. fever, cold or flu) please contact your doctor as soon as possible. 8. It is important that you be on time.   If for any reason you are unable to keep your appointment please call the physicians office prior to your scheduled procedure

## 2020-08-20 NOTE — H&P
77 y.o. male for open access colonoscopy for screening   Additional data for completion of the targeted pre-endoscopy H&P will be provided under 'H&P interval notes'. Please see that document which will be attached to this.   Rere South MD  Last colonoscopy Louann 2014 tubular adenoma

## 2020-08-21 ENCOUNTER — HOSPITAL ENCOUNTER (OUTPATIENT)
Age: 65
Setting detail: OUTPATIENT SURGERY
Discharge: HOME | End: 2020-08-21
Attending: SPECIALIST | Admitting: SPECIALIST
Payer: MEDICARE

## 2020-08-21 ENCOUNTER — ANESTHESIA EVENT (OUTPATIENT)
Dept: ENDOSCOPY | Age: 65
End: 2020-08-21
Payer: MEDICARE

## 2020-08-21 ENCOUNTER — ANESTHESIA (OUTPATIENT)
Dept: ENDOSCOPY | Age: 65
End: 2020-08-21
Payer: MEDICARE

## 2020-08-21 VITALS
RESPIRATION RATE: 18 BRPM | OXYGEN SATURATION: 100 % | WEIGHT: 237.88 LBS | SYSTOLIC BLOOD PRESSURE: 133 MMHG | DIASTOLIC BLOOD PRESSURE: 77 MMHG | HEART RATE: 82 BPM | HEIGHT: 71 IN | TEMPERATURE: 98 F | BODY MASS INDEX: 33.3 KG/M2

## 2020-08-21 PROCEDURE — 74011250636 HC RX REV CODE- 250/636: Performed by: NURSE ANESTHETIST, CERTIFIED REGISTERED

## 2020-08-21 PROCEDURE — 88305 TISSUE EXAM BY PATHOLOGIST: CPT

## 2020-08-21 PROCEDURE — 74011250637 HC RX REV CODE- 250/637: Performed by: SPECIALIST

## 2020-08-21 PROCEDURE — 77030013992 HC SNR POLYP ENDOSC BSC -B: Performed by: SPECIALIST

## 2020-08-21 PROCEDURE — 76040000019: Performed by: SPECIALIST

## 2020-08-21 PROCEDURE — 76060000031 HC ANESTHESIA FIRST 0.5 HR: Performed by: SPECIALIST

## 2020-08-21 PROCEDURE — 74011000250 HC RX REV CODE- 250: Performed by: NURSE ANESTHETIST, CERTIFIED REGISTERED

## 2020-08-21 RX ORDER — FENTANYL CITRATE 50 UG/ML
25 INJECTION, SOLUTION INTRAMUSCULAR; INTRAVENOUS AS NEEDED
Status: DISCONTINUED | OUTPATIENT
Start: 2020-08-21 | End: 2020-08-21 | Stop reason: HOSPADM

## 2020-08-21 RX ORDER — PROPOFOL 10 MG/ML
INJECTION, EMULSION INTRAVENOUS
Status: DISCONTINUED | OUTPATIENT
Start: 2020-08-21 | End: 2020-08-21 | Stop reason: HOSPADM

## 2020-08-21 RX ORDER — FLUMAZENIL 0.1 MG/ML
0.2 INJECTION INTRAVENOUS
Status: DISCONTINUED | OUTPATIENT
Start: 2020-08-21 | End: 2020-08-21 | Stop reason: HOSPADM

## 2020-08-21 RX ORDER — NALOXONE HYDROCHLORIDE 0.4 MG/ML
0.4 INJECTION, SOLUTION INTRAMUSCULAR; INTRAVENOUS; SUBCUTANEOUS
Status: DISCONTINUED | OUTPATIENT
Start: 2020-08-21 | End: 2020-08-21 | Stop reason: HOSPADM

## 2020-08-21 RX ORDER — SODIUM CHLORIDE 9 MG/ML
50 INJECTION, SOLUTION INTRAVENOUS CONTINUOUS
Status: DISCONTINUED | OUTPATIENT
Start: 2020-08-21 | End: 2020-08-21 | Stop reason: HOSPADM

## 2020-08-21 RX ORDER — DEXTROMETHORPHAN/PSEUDOEPHED 2.5-7.5/.8
1.2 DROPS ORAL
Status: DISCONTINUED | OUTPATIENT
Start: 2020-08-21 | End: 2020-08-21 | Stop reason: HOSPADM

## 2020-08-21 RX ORDER — MIDAZOLAM HYDROCHLORIDE 1 MG/ML
.25-5 INJECTION, SOLUTION INTRAMUSCULAR; INTRAVENOUS AS NEEDED
Status: DISCONTINUED | OUTPATIENT
Start: 2020-08-21 | End: 2020-08-21 | Stop reason: HOSPADM

## 2020-08-21 RX ORDER — SODIUM CHLORIDE 9 MG/ML
INJECTION, SOLUTION INTRAVENOUS
Status: DISCONTINUED | OUTPATIENT
Start: 2020-08-21 | End: 2020-08-21 | Stop reason: HOSPADM

## 2020-08-21 RX ORDER — LIDOCAINE HYDROCHLORIDE 20 MG/ML
INJECTION, SOLUTION EPIDURAL; INFILTRATION; INTRACAUDAL; PERINEURAL AS NEEDED
Status: DISCONTINUED | OUTPATIENT
Start: 2020-08-21 | End: 2020-08-21 | Stop reason: HOSPADM

## 2020-08-21 RX ORDER — PROPOFOL 10 MG/ML
INJECTION, EMULSION INTRAVENOUS AS NEEDED
Status: DISCONTINUED | OUTPATIENT
Start: 2020-08-21 | End: 2020-08-21 | Stop reason: HOSPADM

## 2020-08-21 RX ADMIN — LIDOCAINE HYDROCHLORIDE 40 MG: 20 INJECTION, SOLUTION INTRAVENOUS at 10:39

## 2020-08-21 RX ADMIN — SODIUM CHLORIDE: 9 INJECTION, SOLUTION INTRAVENOUS at 10:36

## 2020-08-21 RX ADMIN — PROPOFOL 140 MCG/KG/MIN: 10 INJECTION, EMULSION INTRAVENOUS at 10:39

## 2020-08-21 RX ADMIN — PROPOFOL 80 MG: 10 INJECTION, EMULSION INTRAVENOUS at 10:39

## 2020-08-21 NOTE — DISCHARGE INSTRUCTIONS
[address] [facility] PARESH Peters MD  [phone]      August 21, 2020    Chalo Dumont  YOB: 1955    COLONOSCOPY DISCHARGE INSTRUCTIONS    If there is redness at IV site you should apply warm compress to area. If redness or soreness persist contact Dr. Bin Peters' or your primary care doctor. There may be a slight amount of blood passed from the rectum. Gaseous discomfort may develop, but walking, belching will help relieve this. You may not operate a vehicle for 12 hours  You may not operate machinery or dangerous appliances for rest of today  You may not drink alcoholic beverages for 12 hours  Avoid making any critical decisions for 24 hours    DIET:  You may resume your normal diet, but some patients find that heavy or large meals may lead to indigestion or vomiting. I suggest a light meal as first food intake. MEDICATIONS:  The use of some over-the-counter pain medication may lead to bleeding after colon biopsies or polyp removal.  Tylenol (also called acetaminophen) is safe to take even if you have had colonoscopy with polyp removal.  Based on the procedure you had today you may not safely take aspirin or aspirin-like products for the next ten (10) days. Remember that Tylenol (also called acetaminophen) is safe to take after colonoscopy even if you have had biopsies or polyps removed. ACTIVITY:  You may resume your normal household activities, but it is recommended that you spend the remainder of the day resting -  avoid any strenuous activity. CALL DR. Julieta García' OFFICE IF:  Increasing pain, nausea, vomiting  Abdominal distension (swelling)  Significant new or increased bleeding (oral or rectal)  Fever/Chills  Chest pain/shortness of breath                       Additional instructions:   No aspirin 10 days. We found two polyps and removed them.   I will contact you with the polyp results in about a week.  For the hemorrhoidal swelling you note from time to time I suggest high fiber diet and that you take metamucil powder, one heaping teaspoon mixed in water twice daily. Do that for 2 months and then re-assess your anal symptoms. If they persist despite this and the symptoms are bothersome enough that you would like intervention, let me know and I could refer you to a surgeon for hemorrhoidectomy. It was an honor to be your doctor today. Please let me or my office staff know if you have any feedback about today's procedure. Eileen Dwyer MD    Colonoscopy saves lives, and can prevent colon cancer. Everyone aged 48 or older needs colonoscopy.   Tell your family and friends: get the test!

## 2020-08-21 NOTE — PROCEDURES
[address] [facility] PARESH Berry MD  [phone]      2020    Colonoscopy Procedure Note  Tori Armendariz  :  1955  [facility] Medical Record Number: [id number]    Indications:     Personal history of colon polyps (screening only), Screening colonoscopy  PCP:  Rosalio English MD  Anesthesia/Sedation: Conscious Sedation/Moderate Sedation/GETA, see notes  Endoscopist:  Dr. Eligio Amaral  Complications:  None  Estimated Blood Loss:  None    Permit:  The indications, risks, benefits and alternatives were reviewed with the patient or their decision maker who was provided an opportunity to ask questions and all questions were answered. The specific risks of colonoscopy with conscious sedation were reviewed, including but not limited to anesthetic complication, bleeding, adverse drug reaction, missed lesion, infection, IV site reactions, and intestinal perforation which would lead to the need for surgical repair. Alternatives to colonoscopy including radiographic imaging, observation without testing, or laboratory testing were reviewed including the limitations of those alternatives. After considering the options and having all their questions answered, the patient or their decision maker provided both verbal and written consent to proceed. Procedure in Detail:  After obtaining informed consent, positioning of the patient in the left lateral decubitus position, and conduction of a pre-procedure pause or \"time out\" the endoscope was introduced into the anus and advanced to the cecum, which was identified by the ileocecal valve and appendiceal orifice. The quality of the colonic preparation was good. A careful inspection was made as the colonoscope was withdrawn, findings and interventions are described below.     Findings:   Ascending polyp 6mm and transverse polyp 5mm, both taken with cold snare, samples retrieved, and hemostasis confirmed. There is diverticulosis in the sigmoid colon without complications such as bleeding, inflammatory change, or luminal narrowing. In the rectum, medium internal hemorrhoids are noted without bleeding. Specimens:    See above    Complications:   None; patient tolerated the procedure well. Impression:  Colon polyps, diverticulosis, and hemorrhoids. Recommendations:     - Await pathology. - High fiber diet and metamucil twice daily    Thank you for entrusting me with this patient's care. Please do not hesitate to contact me with any questions or if I can be of assistance with any of your other patients' GI needs. Signed By: Azucena Flood MD                        August 21, 2020      Surgical assistant none. Implants none unless specified.

## 2020-08-21 NOTE — INTERVAL H&P NOTE
Pre-Endoscopy H&P Update Chief complaint/HPI/ROS:  The indication for the procedure, the patient's history and the patient's current medications are reviewed prior to the procedure and that data is reported on the H&P to which this document is attached. Any significant complaints with regard to organ systems will be noted, and if not mentioned then a review of systems is not contributory. Past Medical History:  
Diagnosis Date  Adverse effect of anesthesia   
 if there is NMBA in medication patient needs to receive medication to flush out of system  Asthma  H/O seasonal allergies  Hyperlipemia  Hypertension  Myasthenia gravis ([address] Past Surgical History:  
Procedure Laterality Date  HX CHOLECYSTECTOMY  HX COLONOSCOPY    
 HX HEENT    
 HX ORTHOPAEDIC    
 HX TONSILLECTOMY  HX TONSILLECTOMY  HX VASECTOMY  SINUS SURGERY PROC UNLISTED Tymectomy Social  
Social History Tobacco Use  Smoking status: Former Smoker  Smokeless tobacco: Never Used Substance Use Topics  Alcohol use: No  
  
Family History Problem Relation Age of Onset  Cancer Mother  Dementia Father  Cancer Father  Neuropathy Brother  Diabetes Brother  Stroke Maternal Grandmother  Diabetes Sister Allergies Allergen Reactions  Ciprofloxacin Other (comments) It aggravates myasthenia gravis Prior to Admission Medications Prescriptions Last Dose Informant Patient Reported? Taking? albuterol (PROVENTIL HFA) 90 mcg/actuation inhaler Unknown at Unknown time Self Yes No  
Sig: Take 2 Puffs by inhalation every four (4) hours as needed. amLODIPine-atorvastatin (CADUET) 10-20 mg per tablet 8/20/2020 at Unknown time Self Yes Yes Sig: Take 1 Tab by mouth nightly. amitriptyline (ELAVIL) 10 mg tablet 8/20/2020 at Unknown time  Yes Yes Sig: Take 10 mg by mouth daily. cholecalciferol (VITAMIN D3) 1,000 unit cap 2020 at Unknown time Self Yes Yes Sig: Take 1,000 Units by mouth daily. fenofibric acid (TRILIPIX ER) 135 mg capsule 2020 at Unknown time Self Yes Yes Sig: Take 135 mg by mouth nightly. finasteride (PROSCAR) 5 mg tablet 2020 at Unknown time  Yes Yes Sig: Take 5 mg by mouth daily. fluticasone (FLONASE) 50 mcg/actuation nasal spray 2020 at Unknown time Self Yes Yes Si Sprays by Both Nostrils route daily as needed. fluticasone-salmeterol (ADVAIR DISKUS) 250-50 mcg/dose diskus inhaler Unknown at Unknown time Self Yes No  
Sig: Take 1 Puff by inhalation every twelve (12) hours. furosemide (LASIX) 20 mg tablet 2020 at Unknown time Self Yes Yes Sig: Take 20 mg by mouth daily. gabapentin (NEURONTIN) 600 mg tablet Unknown at Unknown time  Yes No  
Sig: Take  by mouth daily as needed. irbesartan (AVAPRO) 300 mg tablet 2020 at Unknown time Self Yes Yes Sig: Take 300 mg by mouth nightly. levocetirizine (XYZAL) 5 mg tablet 2020 at Unknown time Self Yes Yes Sig: Take 5 mg by mouth daily. montelukast (SINGULAIR) 10 mg tablet 2020 at Unknown time Self Yes Yes Sig: Take 10 mg by mouth daily. mycophenolate (CELLCEPT) 500 mg tablet 2020 at 0630  No Yes Sig: TAKE 2 TABLETS BY MOUTH TWICE DAILY  
niacin-inositol (NIACIN FLUSH FREE) 400 mg niacin (500 mg) cap 2020 at Unknown time Self Yes Yes Sig: Take 1 Cap by mouth nightly. potassium chloride SR (KLOR-CON 10) 10 mEq tablet 2020 at Unknown time Self Yes Yes Sig: Take 10 mEq by mouth two (2) times a day. predniSONE (DELTASONE) 10 mg tablet 2020 at 0715  No Yes Sig: TAKE 4 TABLETS BY MOUTH DAILY Patient taking differently: 5 mg daily. pyridostigmine (MESTINON) 60 mg tablet 2020 at 0715  No Yes Sig: TAKE 1 TABLET BEFORE MEALS, AFTER MEALS AND AT BEDTIME (MAY TAKE UP TO 7 TIMES DAILY) Facility-Administered Medications: None PHYSICAL EXAM:  The patient is examined immediately prior to the procedure. Visit Vitals /77 Pulse 98 Temp 98.8 °F (37.1 °C) Resp 14 Ht 5' 11\" (1.803 m) Wt 107.9 kg (237 lb 14 oz) SpO2 99% BMI 33.18 kg/m² Gen: Appears comfortable, no distress. Pulm: comfortable respirations with no abnormal audible breath sounds HEART: well perfused, no abnormal audible heart sounds GI: abdomen flat. PLAN:  Informed consent discussion held, patient afforded an opportunity to ask questions and all questions answered. After being advised of the risks, benefits, and alternatives, the patient requested that we proceed and indicated so on a written consent form. Will proceed with procedure as planned.  
Adeola Yepez MD

## 2020-08-21 NOTE — ANESTHESIA PREPROCEDURE EVALUATION
Relevant Problems   No relevant active problems       Anesthetic History   No history of anesthetic complications            Review of Systems / Medical History  Patient summary reviewed and pertinent labs reviewed    Pulmonary  Within defined limits            Pertinent negatives: No asthma     Neuro/Psych   Within defined limits           Cardiovascular    Hypertension: well controlled          Hyperlipidemia    Exercise tolerance: >4 METS     GI/Hepatic/Renal                Endo/Other             Other Findings   Comments: For repeat colonoscopy today. Myasthenia Gravis on mestanon, steroids and cellsept. Thymectomy within  The last 12 months.            Physical Exam    Airway  Mallampati: II  TM Distance: 4 - 6 cm  Neck ROM: normal range of motion   Mouth opening: Normal     Cardiovascular    Rhythm: regular  Rate: normal         Dental    Dentition: Caps/crowns and Implants     Pulmonary  Breath sounds clear to auscultation               Abdominal  GI exam deferred       Other Findings            Anesthetic Plan    ASA: 3  Anesthesia type: MAC          Induction: Intravenous  Anesthetic plan and risks discussed with: Patient

## 2020-08-21 NOTE — PROGRESS NOTES
Anesthesia staff at patient's bedside administering anesthesia and monitoring patients vital signs throughout procedure. See anesthesia note. ABD remains soft and non-tender post procedure. Pt has no complaints at this time and tolerated the procedure well. Endoscope was pre-cleaned at bedside immediately following procedure by Gilberto Cabot.

## 2020-08-21 NOTE — ANESTHESIA POSTPROCEDURE EVALUATION
Procedure(s):  COLONOSCOPY  POLYPECTOMY. MAC    Anesthesia Post Evaluation      Multimodal analgesia: multimodal analgesia not used between 6 hours prior to anesthesia start to PACU discharge  Patient location during evaluation: PACU  Patient participation: complete - patient participated  Level of consciousness: awake and alert  Pain score: 0  Pain management: satisfactory to patient  Airway patency: patent  Anesthetic complications: no  Cardiovascular status: acceptable  Respiratory status: acceptable  Hydration status: acceptable  Post anesthesia nausea and vomiting:  none  Final Post Anesthesia Temperature Assessment:  Normothermia (36.0-37.5 degrees C)      INITIAL Post-op Vital signs:   Vitals Value Taken Time   /77 8/21/2020 11:18 AM   Temp 36.7 °C (98 °F) 8/21/2020 11:03 AM   Pulse 82 8/21/2020 11:18 AM   Resp 19 8/21/2020 11:18 AM   SpO2 100 % 8/21/2020 11:18 AM   Vitals shown include unvalidated device data.

## 2020-08-21 NOTE — PROGRESS NOTES
Endoscopy discharge instructions have been reviewed and given to patient. The patient verbalized understanding and acceptance of instructions. Dr. Bin Peters  discussed with spouse procedure findings and next steps.

## 2020-08-21 NOTE — PROGRESS NOTES
[facility]  1955  [id number]    Situation:  Verbal report received from:   Oracio Villanueva RN   Procedure: Procedure(s):  COLONOSCOPY  POLYPECTOMY    Background:    Preoperative diagnosis: HX COLON POLYPS  Postoperative diagnosis: Hemorrhoids  Diverticulosis  Ascending Colon Polyp  Transverse Colon Polyp    :  Dr. Antonieta Pineda  Assistant(s): Endoscopy Technician-1: Rufino Leyva  Endoscopy RN-1: David Hodge    Specimens:   ID Type Source Tests Collected by Time Destination   1 : Polyp Preservative Colon, Ascending  Jenni Santos MD 8/21/2020 1049 Pathology   2 : Polyp Preservative Colon, Transverse  Jenni Santos MD 8/21/2020 1051 Pathology     H. Pylori  no    Assessment:  Intra-procedure medications       Anesthesia gave intra-procedure sedation and medications, see anesthesia flow sheet yes    Intravenous fluids: NS@ KVO     Vital signs stable   yes    Abdominal assessment: round and soft   yes    Recommendation:  Discharge patient per MD order  yes.   Return to floor  outpatient  Family or Friend   spouse  Permission to share finding with family or friend yes

## 2020-09-01 DIAGNOSIS — G70.00 MYASTHENIA GRAVIS (HCC): ICD-10-CM

## 2020-10-15 ENCOUNTER — OFFICE VISIT (OUTPATIENT)
Dept: NEUROLOGY | Age: 65
End: 2020-10-15
Payer: MEDICARE

## 2020-10-15 VITALS — TEMPERATURE: 97.3 F | BODY MASS INDEX: 34.03 KG/M2 | WEIGHT: 244 LBS

## 2020-10-15 DIAGNOSIS — G70.00 MYASTHENIA GRAVIS (HCC): Primary | ICD-10-CM

## 2020-10-15 PROCEDURE — 1101F PT FALLS ASSESS-DOCD LE1/YR: CPT | Performed by: PSYCHIATRY & NEUROLOGY

## 2020-10-15 PROCEDURE — G8432 DEP SCR NOT DOC, RNG: HCPCS | Performed by: PSYCHIATRY & NEUROLOGY

## 2020-10-15 PROCEDURE — 3017F COLORECTAL CA SCREEN DOC REV: CPT | Performed by: PSYCHIATRY & NEUROLOGY

## 2020-10-15 PROCEDURE — G8536 NO DOC ELDER MAL SCRN: HCPCS | Performed by: PSYCHIATRY & NEUROLOGY

## 2020-10-15 PROCEDURE — G8756 NO BP MEASURE DOC: HCPCS | Performed by: PSYCHIATRY & NEUROLOGY

## 2020-10-15 PROCEDURE — G8427 DOCREV CUR MEDS BY ELIG CLIN: HCPCS | Performed by: PSYCHIATRY & NEUROLOGY

## 2020-10-15 PROCEDURE — 99215 OFFICE O/P EST HI 40 MIN: CPT | Performed by: PSYCHIATRY & NEUROLOGY

## 2020-10-15 PROCEDURE — G8419 CALC BMI OUT NRM PARAM NOF/U: HCPCS | Performed by: PSYCHIATRY & NEUROLOGY

## 2020-10-15 RX ORDER — PYRIDOSTIGMINE BROMIDE 180 MG/1
180 TABLET, EXTENDED RELEASE ORAL
Qty: 90 TAB | Refills: 3 | Status: SHIPPED | OUTPATIENT
Start: 2020-10-15 | End: 2021-09-13 | Stop reason: DRUGHIGH

## 2020-10-15 RX ORDER — AMITRIPTYLINE HYDROCHLORIDE 25 MG/1
TABLET, FILM COATED ORAL
COMMUNITY
Start: 2020-08-02

## 2020-10-15 NOTE — PROGRESS NOTES
Neurology Progress Note    Patient ID:  Kojo Millan  [id number]  89 y.o.  1955      Subjective:   History:  Mr. Kojo Millan is a  Male with Asthma; H/O seasonal allergies; Hyperlipemia; and Hypertension who was admitted at [address] [place] [address] for [address] started last 8/6/16, noticeable on R gaze, associated with R eye discomfort. Patient (+) AChR Abs. CT chest negative for thymoma. His allergy to ragweed can cause flare ups described as increased blurred vision and has to adjust his Prednisone. On Glynn 3, 2020, patient had open thymectomy (due to large thymus) complicated with pneumothorax.      Last time, Prednisone was decreased to 5 mg every day with no worsening. Still on Cellcept 500 mg 2 tabs BID and Mestinon 4 times/ day. Still having tightness and pain of the L shoulder since the thymectomy. ROS:  Per HPI-  Otherwise the remainder of ROS was negative    Social Hx  Social History     Socioeconomic History    Marital status:      Spouse name: Not on file    Number of children: Not on file    Years of education: Not on file    Highest education level: Not on file   Tobacco Use    Smoking status: Former Smoker    Smokeless tobacco: Never Used   Substance and Sexual Activity    Alcohol use: No    Drug use: Not Currently       Meds:  Current Outpatient Medications on File Prior to Visit   Medication Sig Dispense Refill    amitriptyline (ELAVIL) 25 mg tablet       mycophenolate (CELLCEPT) 500 mg tablet TAKE 2 TABLETS BY MOUTH TWICE DAILY 360 Tab 1    pyridostigmine (MESTINON) 60 mg tablet TAKE 1 TABLET BEFORE MEALS, AFTER MEALS AND AT BEDTIME (MAY TAKE UP TO 7 TIMES DAILY) 630 Tab 3    predniSONE (DELTASONE) 10 mg tablet TAKE 4 TABLETS BY MOUTH DAILY (Patient taking differently: 5 mg daily.) 360 Tab 3    finasteride (PROSCAR) 5 mg tablet Take 5 mg by mouth daily. 3    gabapentin (NEURONTIN) 600 mg tablet Take  by mouth daily as needed.       furosemide (LASIX) 20 mg tablet Take 20 mg by mouth daily.  fluticasone (FLONASE) 50 mcg/actuation nasal spray 2 Sprays by Both Nostrils route daily as needed.  montelukast (SINGULAIR) 10 mg tablet Take 10 mg by mouth daily.  levocetirizine (XYZAL) 5 mg tablet Take 5 mg by mouth daily.  albuterol (PROVENTIL HFA) 90 mcg/actuation inhaler Take 2 Puffs by inhalation every four (4) hours as needed.  cholecalciferol (VITAMIN D3) 1,000 unit cap Take 1,000 Units by mouth daily.  amLODIPine-atorvastatin (CADUET) 10-20 mg per tablet Take 1 Tab by mouth nightly.  irbesartan (AVAPRO) 300 mg tablet Take 300 mg by mouth nightly.  fenofibric acid (TRILIPIX ER) 135 mg capsule Take 135 mg by mouth nightly.  niacin-inositol (NIACIN FLUSH FREE) 400 mg niacin (500 mg) cap Take 1 Cap by mouth nightly.  potassium chloride SR (KLOR-CON 10) 10 mEq tablet Take 10 mEq by mouth two (2) times a day.  fluticasone-salmeterol (ADVAIR DISKUS) 250-50 mcg/dose diskus inhaler Take 1 Puff by inhalation every twelve (12) hours. No current facility-administered medications on file prior to visit. Imaging:    CT Results (recent):  Results from Hospital Encounter encounter on 05/24/19   CT CHEST W CONT    Narrative Indication: Myasthenia gravis evaluate for thymoma. COMPARISON: 10/11/2016    Multiple axial images were obtained from the lung apices to the adrenal glands  following the uneventful administration of 100 mL of Isovue 300. Images  reformatted in sagittal coronal plane. CT dose reduction was achieved through  use of a standardized protocol tailored for this examination and automatic  exposure control for dose modulation. The thyroid gland is unremarkable. No axillary adenopathy. There is no mediastinal or hilar adenopathy. No evidence for anterior  mediastinal mass or thymoma. Small pericardial effusion has decreased. No  pleural effusions. The lungs are clear of an acute process.  The 3 mm pulmonary nodule in the left  upper lobe with smooth margins is stable. No new pulmonary nodules. Review of bone windows is unremarkable. Incidental imaging through the upper abdomen reveals a new oval-shaped  low-density possibly cystic lesion adjacent to the pancreas and possibly arising  from the pancreas. This projects just inferior to the stomach as well. Exact  origin of this is uncertain. This was not present previously. This measures 2.2  x 1.6 cm. Further evaluation is suggested. MRI of the abdomen is more  information. In the anterior aspect of the spleen is a new rounded hypodensity measuring 8  mm. This was not present previously. Posteriorly in the spleen is a small  hypodensity that has not changed significantly when compared to previous exam.    No adrenal lesions. Impression 1. No evidence for thymoma  2. Stable pulmonary nodule in the left upper lobe. 3. There is a new low density probably cystic lesion in the upper abdomen  adjacent to the pancreas and stomach. MRI of the pancreas is suggested. Exact  origin of this new lesion is uncertain. 4. New small hypodensity in the spleen too small to characterize. 23X           MRI Results (recent):  Results from [place] encounter on 03/19/20   MRI ABD W WO CONT    Narrative EXAM:  MRI ABD W WO CONT    INDICATION:   Follow-up pancreatic cyst    COMPARISON: 6/18/2019    CONTRAST:  10 mL of Gadavist.    TECHNIQUE:   MR of the abdomen was performed and the following sequences were obtained:  Coronal HASTE, multiplanar MRCP, axial in and out-of-phase T1, axial T1  fat-saturated, axial T2, and pre- and post contrast T1-weighted images. FINDINGS:  The liver is normal. There is a 5 mm cyst in segment 2 . Patient status post  cholecystectomy. The spleen is normal. There are 2 tiny cysts measuring 5 mm  and 6 mm. There is no pancreatic ductal dilatation. . There is a 4 x 2 mm cyst in the  pancreatic head.  There is a 1.5 x 0.6 cm cyst in pancreatic neck. There is a 2.1  x 1.7 cm cyst projected off of the pancreatic body superiorly. There is a 1.1 x  0.3 cm cyst in the pancreatic body inferiorly. There is a 1.0 x 0.7cm cyst in  pancreatic tail. There is no adrenal lesion. .  The kidneys are without evidence of mass lesion or  hydronephrosis. . Right renal cyst.    There is no intra or extrahepatic biliary ductal dilatation. No biliary  strictures or filling defects are seen. There is no pancreas divisum. There is no lymphadenopathy or ascites. Impression IMPRESSION:    Stable scattered pancreatic T2 hyperdense foci the largest measuring 2.1 x 1.7  cm projected off the pancreatic body superiorly and most likely represent side  branch type IPMN. The remainder of the examination is also stable. No acute process is identified. Follow-up imaging in 12-24 months is suggested. IR Results (recent):  No results found for this or any previous visit. VAS/US Results (recent):  No results found for this or any previous visit. Reviewed records in [facility] and media tab today    Lab Review     Hospital Outpatient Visit on 08/17/2020   Component Date Value Ref Range Status    SARS-CoV-2 08/17/2020 Not Detected  Not Detected   Final    Comment: (NOTE)  This test was developed and its performance characteristics  determined by [employer] This test has not been FDA  cleared or approved. This test has been authorized by FDA under an  Emergency Use Authorization (EUA). This test is only authorized for  the duration of time the declaration that circumstances exist  justifying the authorization of the emergency use of in vitro  diagnostic tests for detection of SARS-CoV-2 virus and/or diagnosis  of COVID-19 infection under section 564(b)(1) of the Act, 21 U. S.C.  316SNP-2(B)(7), unless the authorization is terminated or revoked  sooner.  When diagnostic testing is negative, the possibility of a  false negative result should be considered in the context of a  patient's recent exposures and the presence of clinical signs and  symptoms consistent with COVID-19. An individual without symptoms of  COVID-19 and who is not shedding SARS-CoV-2 virus would expect to  have a negative (not detected) result in this assay. Performed                            At: [address] [id number]  Melia Zavala MD YN:[id number]           Objective:       Exam:  Visit Vitals  Temp 97.3 °F (36.3 °C)   Wt 110.7 kg (244 lb)   BMI 34.03 kg/m²     Gen: Awake, alert, follows commands  Appropriate appearance, normal speech. Oriented to all spheres. No visual field defect on confrontation exam.  Full eyes movement, with no nystagmus, no diplopia, no ptosis. Normal gag and swallow. All remaining cranial nerves were normal  Motor function: 5/5 in all extremities  (+) tightness and decrease ROM of L shoulder  Sensory: intact to LT, PP and JPS  Good FTN and HTS   Gait: Normal    Assessment:       ICD-10-CM ICD-9-CM    1. [facility]  G70.00 358.00 REFERRAL TO PHYSICAL THERAPY       Oculopharyngeal      (+) AChR Abs.     S/p thymectomy    MG ADL 5    L shoulder tightness and pain, possible frozen shoulder s/p thymectomy      Plan:   1. Will refer to physical therapy to work on L shoulder  2. Check CBC. Patient will get it done via his PCP  3. Continue Prednisone 5 mg every day 4. Conitnue Cellcept 500 mg 2 tabs BID  5. Will need to follow up with dermatologist to monitor for skin CA (history of Basal Cell CA) and \"lumps\"  5. Continue Mestinon 60 mg 4 times a day  6. Will add Timespan 180 mg QHS  7. GI and bone prophylaxis       Follow-up and Dispositions    · Return in about 4 months (around 2/15/2021).                Dennis Cantu MD  Diplomate, American Board of Psychiatry and Neurology  Diplomate, Neuromuscular Medicine  Diplomate, American Board of Electrodiagnostic Medicine

## 2020-10-26 ENCOUNTER — HOSPITAL ENCOUNTER (OUTPATIENT)
Dept: PHYSICAL THERAPY | Age: 65
Discharge: HOME | End: 2020-10-26
Payer: MEDICARE

## 2020-10-26 PROCEDURE — 97162 PT EVAL MOD COMPLEX 30 MIN: CPT | Performed by: PHYSICAL THERAPIST

## 2020-10-26 PROCEDURE — 97110 THERAPEUTIC EXERCISES: CPT | Performed by: PHYSICAL THERAPIST

## 2020-10-26 NOTE — PROGRESS NOTES
PT INITIAL EVALUATION NOTE - [facility] -15    Patient Name: Paul Markham  Date:10/26/2020  : 1955  [x]  Patient  Verified  [place]or: [place] MEDICARE / Plan: [place] MEDICARE PART A & B / Product Type: Medicare /    In time:9:20 AM  Out time:10:10 AM  Total Treatment Time (min): 50  Total Timed Codes (min): 10  1:1 Treatment Time ( only): 10  Visit #: 1     Treatment Area: Bilateral shoulder pain [M25.511, M25.512]    SUBJECTIVE  Pain Level (0-10 scale): 0  At worst: 8/10  At best: 0/10  Any medication changes, allergies to medications, adverse drug reactions, diagnosis change, or new procedure performed?: [] No    [x] Yes (see summary sheet for update)  Subjective:    Pt c/o B shoulder pain, began in January after open chest surgery, Thymectomy. \"I can't get my left hand behind the plane of my body, pain runs down the arm and across my forearm, a little in the right arm but not as bad. \" Pain has worsened since onset. Pain is increased by laying on his left side. Pt has trouble sleeping because it hurts to lay on either side. Pt denies numbness/ tingling. Pt with R SLAP injury 5-6 years ago. No imaging. Pt takes Tramadol when its at its worse. Pt takes Tylenol which lasts 3-4 hours. PLOF: Retired. Pt enjoys playing golf, going to his beach house.     Mechanism of Injury: Insidious onset  Previous Treatment/Compliance: Massage  PMHx/Surgical Hx: HTN, asthma, ocular myasthenia gravis, 2 lumbar microdiscectomy (early ) , gallbladder removed, allergies (ragweed)  Work Hx: Retired  Living Situation: Lives with his wife  Pt Goals: \"free up range of motion and eliminate pain\"  Barriers: Chronic nature of condition  Motivation: Good  Substance use: None  Cognition: A & O x 3        OBJECTIVE/EXAMINATION   Posture:  Sitting with left leg crossed over right   L shoulder higher, R shoulder winged  Other Observations:  Hip ER in supine  Functional  and Pinch:  L hand weakness    Upper Extremity AROM:         R   L  Shoulder Flexion  150   140 p! Shoulder Abduction   180    170 p! Shoulder Extension  70   60  Shoulder ER   85 p!   70  Shoulder IR   T7 p! T11 p! Cervical AROM  Flexion WNL  Extension 35  SB 25 B  Rotation R 50 L 60    UPPER QUARTER   MUSCLE STRENGTH  KEY       R  L  0 - No Contraction  C1, C2 Neck Flex 5    1 - Trace   C3 Side Flex  5  5    2 - Poor   C4 Sh Elev  5  5    3 - Fair    C5 Deltoid/Biceps 5  5    4 - Good   C6 Wrist Ext  5  5    5 - Normal   C7 Triceps  5  5         C8 Thumb Ext  5  5        T1 Hand Inst  5  4      MMT: See above  Shoulder ER 5/5 p! On L  Shoulder IR 5/5 p!  On L    Special Tests:   Shoulder PROM, R flexion 180 L 150, R IR/ER 90, L 45/45, L horizontal abd 0      Modality rationale: decrease pain and increase tissue extensibility to improve the patients ability to sit, stand, transfer, ambulate, lift, carry, reach, complete ADLs   Min Type Additional Details    [] Estim: []Att   []Unatt        []TENS instruct                  []IFC  []Premod   []NMES                     []Other:  []w/US   []w/ice   []w/heat  Position:  Location:    []  Traction: [] Cervical       []Lumbar                       [] Prone          []Supine                       []Intermittent   []Continuous Lbs:  [] before manual  [] after manual  []w/heat    []  Ultrasound: []Continuous   [] Pulsed at:                           []1MHz   []3MHz Location:  W/cm2:    [] Paraffin         Location:   []w/heat   5 []  Ice     [x]  Heat  []  Ice massage Position: sitting  Location: shoulder and back    []  Laser  []  Other: Position:  Location:      []  Vasopneumatic Device Pressure:       [] lo [] med [] hi   Temperature:      [x] Skin assessment post-treatment:  [x]intact []redness- no adverse reaction    []redness  adverse reaction:     10 min Therapeutic Exercise:  [x] See flow sheet :   Rationale: increase ROM, increase strength and improve coordination to improve the patients ability to sit, stand, transfer, ambulate, lift, carry, reach, complete ADLs        With   [x] TE   [] TA   [] neuro   [] other: Patient Education: [x] Review HEP    [] Progressed/Changed HEP based on:   [x] positioning   [x] body mechanics   [] transfers   [x] heat/ice application    [] other:        Pain Level (0-10 scale) post treatment: 0    ASSESSMENT/Changes in Function:     [x]  See Plan of [address] [place] [address], PT 10/26/2020

## 2020-10-26 NOTE — PROGRESS NOTES
[address]  Phone: [phone]  Fax: [phone]    Plan of Care/Statement of Necessity for Physical Therapy Services  2-15    Patient name: Urmila Yarbrough  : 1955  Provider#: [id number]  Referral source: Cheryl Coleman MD      Medical/Treatment Diagnosis: Bilateral shoulder pain [M25.511, M25.512]     Prior Hospitalization: see medical history     Comorbidities: Thymectomy in January, HTN, asthma, ocular myasthenia gravis, 2 lumbar microdiscectomy (early ) , gallbladder removed, allergies (ragweed)  Prior Level of Function: Retired. Pt enjoys playing golf, going to his beach house. Medications: Verified on Patient Summary List  Start of Care: 10/26/20      Onset Date: January   The [address] [place] [address] and following information is based on the information from the initial evaluation. Assessment/ key information: The patient presents with B shoulder pain which began in January following Thymectomy surgery. The patient's condition affects his ability to reach back and lay on his side. The patient's condition has progressively worsened since onset and is complicated by complex PMH and cervical tightness. The patient would benefit from outpatient PT to address pain and restore ROM for improved ease with ADls and household chores. Evaluation Complexity History HIGH Complexity :3+ comorbidities / personal factors will impact the outcome/ POC ; Examination HIGH Complexity : 4+ Standardized tests and measures addressing body structure, function, activity limitation and / or participation in recreation  ;Presentation MEDIUM Complexity : Evolving with changing characteristics  ; Clinical Decision Making MEDIUM Complexity : FOTO score of 26-74  Overall Complexity Rating: MEDIUM    Problem List: pain affecting function, decrease ROM, decrease strength, edema affecting function, impaired gait/ balance, decrease ADL/ functional abilitiies, decrease activity tolerance, decrease flexibility/ joint mobility and decrease transfer abilities   Treatment Plan may include any combination of the following: Therapeutic exercise, Therapeutic activities, Neuromuscular re-education, Physical agent/modality, Gait/balance training, Manual therapy, Patient education and Functional mobility training  Patient / Family readiness to learn indicated by: asking questions, trying to perform skills and interest  Persons(s) to be included in education: patient (P)  Barriers to Learning/Limitations: None  Patient Goal (s): free up ROM and eliminate pain  Patient Self Reported Health Status: fair  Rehabilitation Potential: good    Short Term Goals: To be accomplished in 4 weeks:  1) The patient will be independent with introductory HEP  2) The patient will improve L shoulder flexion AROM to 145 without an increase in pain  3) The patient will improve L shoulder extension to 65 deg without an increase in pain  Long Term Goals: To be accomplished in 12 weeks:  1) The patient will report ability to sleep through the night without an increase in pain  2) The patient will demonstrate pain free shoulder AROM [facility] multidirectionally without an increase in pain to improve ease with household chores  3) The patient will demonstrate ability to lift 10# overhead without an increase in pain  Frequency / Duration: Patient to be seen 1 times per week for 12 weeks. Patient/ Caregiver education and instruction: self care, activity modification and exercises    [x]  Plan of care has been reviewed with PTA        Certification Period: 10/26/20-1/26/21  Rubia Hartman, PT 10/26/2020     ________________________________________________________________________    I certify that the above Therapy Services are being furnished while the patient is under my care. I agree with the treatment plan and certify that this therapy is necessary.     [facility] Signature:____________________  Date:____________Time: _________

## 2020-10-28 ENCOUNTER — HOSPITAL ENCOUNTER (OUTPATIENT)
Dept: PHYSICAL THERAPY | Age: 65
Discharge: HOME | End: 2020-10-28
Payer: MEDICARE

## 2020-10-28 PROCEDURE — 97140 MANUAL THERAPY 1/> REGIONS: CPT

## 2020-10-28 PROCEDURE — 97110 THERAPEUTIC EXERCISES: CPT

## 2020-10-28 NOTE — PROGRESS NOTES
PT DAILY TREATMENT NOTE - [facility] 2-15    Patient Name: Abdi Kelly  Date:10/28/2020  : 1955  [x]  Patient  Verified  Payor: [place] MEDICARE / Plan: [place] MEDICARE PART A & B / Product Type: Medicare /    In time:1:55p  Out time:2:50p  Total Treatment Time (min): 50  Total Timed Codes (min): 45  1:1 Treatment Time ( [address] only): 45   Visit #:  2    Treatment Area: Bilateral shoulder pain [M25.511, M25.512]    SUBJECTIVE  Pain Level (0-10 scale): 0  Any medication changes, allergies to medications, adverse drug reactions, diagnosis change, or new procedure performed?: [x] No    [] Yes (see summary sheet for update)  Subjective functional status/changes:   [] No changes reported  Patient reports he continues to get pain at end range of certain movements but feels it is better than when he first came in. Patient reports he was a little ache after last session, but had a massage yesterday and feels much better. Patient will be out of town on vacation for a few weeks.     OBJECTIVE    Modality rationale: decrease pain and increase tissue extensibility to improve the patients ability to lift, carry, reach and complete ADL's   Min Type Additional Details       [] Estim: []Att   []Unatt    []TENS instruct                  []IFC  []Premod   []NMES                     []Other:  []w/US   []w/ice   []w/heat  Position:  Location:       []  Traction: [] Cervical       []Lumbar                       [] Prone          []Supine                       []Intermittent   []Continuous Lbs:  [] before manual  [] after manual  []w/heat    []  Ultrasound: []Continuous   [] Pulsed                       at: []1MHz   []3MHz Location:  W/cm2:    [] Paraffin         Location:   []w/heat   5 []  Ice     [x]  Heat  []  Ice massage Position: supine  Location: bilateral shoulder    []  Laser  []  Other: Position:  Location:      []  Vasopneumatic Device Pressure:       [] lo [] med [] hi   Temperature:      [x] Skin assessment post-treatment: [x]intact []redness- no adverse reaction    []redness  adverse reaction:     30 min Therapeutic Exercise:  [x] See flow sheet :   Rationale: increase ROM and increase strength to improve the patients ability to lift, carry, reach and complete ADL's    15 min Manual Therapy: MFR posterior shoulder stabilizers, GH jt mobs grad II-III, PROM to tolerance, mobs with movement    Rationale: decrease pain, increase ROM, increase tissue extensibility and decrease trigger points to improve the patients ability to lift, carry, reach and complete ADL's      With   [] TE   [] TA   [] neuro   [] other: Patient Education: [x] Review HEP    [] Progressed/Changed HEP based on:   [] positioning   [] body mechanics   [] transfers   [] heat/ice application    [] other:      Other Objective/Functional Measures: min cues for proper postural awareness, no pain with advanced exercises     Pain Level (0-10 scale) post treatment: 0    ASSESSMENT/Changes in Function:     Patient will continue to benefit from skilled PT services to modify and progress therapeutic interventions, address functional mobility deficits, address ROM deficits, address strength deficits, analyze and address soft tissue restrictions, analyze and cue movement patterns, analyze and modify body mechanics/ergonomics and assess and modify postural abnormalities to attain remaining goals. []  See Plan of Care  []  See progress note/recertification  []  See Discharge Summary         Progress towards goals / Updated goals:  Patient did well with advance exercises and is making good initial progress towards goals.     PLAN  [x]  Upgrade activities as tolerated     [x]  Continue plan of care  [x]  Update interventions per flow sheet       []  Discharge due to:_  []  Other:_      Leni Rodney, PTA 10/28/2020

## 2020-12-08 DIAGNOSIS — G70.00 OCULAR MYASTHENIA GRAVIS (HCC): ICD-10-CM

## 2020-12-08 DIAGNOSIS — G70.00 MYASTHENIA GRAVIS (HCC): ICD-10-CM

## 2020-12-08 RX ORDER — MYCOPHENOLATE MOFETIL 500 MG/1
TABLET ORAL
Qty: 360 TAB | Refills: 3 | Status: SHIPPED | OUTPATIENT
Start: 2020-12-08 | End: 2021-02-07

## 2020-12-08 NOTE — TELEPHONE ENCOUNTER
Received a fax from [address] [place] [address] patient requesting a refill of mycophenolate 500mg tabs. 90 day supply sent with refills.

## 2020-12-14 ENCOUNTER — HOSPITAL ENCOUNTER (OUTPATIENT)
Dept: PHYSICAL THERAPY | Age: 65
Discharge: HOME | End: 2020-12-14
Payer: MEDICARE

## 2020-12-14 PROCEDURE — 97140 MANUAL THERAPY 1/> REGIONS: CPT | Performed by: PHYSICAL THERAPIST

## 2020-12-14 PROCEDURE — 97110 THERAPEUTIC EXERCISES: CPT | Performed by: PHYSICAL THERAPIST

## 2020-12-14 NOTE — PROGRESS NOTES
PT DAILY TREATMENT NOTE - [facility] 2-15    Patient Name: Walt Augustine  Date:2020  : 1955  [x]  Patient  Verified  [place]or: [place] MED[place] / Plan: [place] MEDICARE PART A & B / Product Type: Medicare /    In time: 11:00 AM  Out time: 11:55 AM  Total Treatment Time (min): 55  Total Timed Codes (min): 45  1:1 Treatment Time ([facility] only): 40  Visit #:  3    Treatment Area: Bilateral shoulder pain [M25.511, M25.512]    SUBJECTIVE  Pain Level (0-10 scale): 0  Any medication changes, allergies to medications, adverse drug reactions, diagnosis change, or new procedure performed?: [x] No    [] Yes (see summary sheet for update)  Subjective functional status/changes:   [] No changes reported  Patient reports his shoulders are getting better but his back is feeling sore  Pt reports compliance with HEP    OBJECTIVE    Modality rationale: decrease pain and increase tissue extensibility to improve the patients ability to lift, carry, reach and complete ADL's   Min Type Additional Details       [] Estim: []Att   []Unatt    []TENS instruct                  []IFC  []Premod   []NMES                     []Other:  []w/US   []w/ice   []w/heat  Position:  Location:       []  Traction: [] Cervical       []Lumbar                       [] Prone          []Supine                       []Intermittent   []Continuous Lbs:  [] before manual  [] after manual  []w/heat    []  Ultrasound: []Continuous   [] Pulsed                       at: []1MHz   []3MHz Location:  W/cm2:    [] Paraffin         Location:   []w/heat   10 []  Ice     [x]  Heat  []  Ice massage Position: supine  Location: bilateral shoulder    []  Laser  []  Other: Position:  Location:      []  Vasopneumatic Device Pressure:       [] lo [] med [] hi   Temperature:      [x] Skin assessment post-treatment:  [x]intact []redness- no adverse reaction    []redness  adverse reaction:     30 min Therapeutic Exercise:  [x] See flow sheet :   Rationale: increase ROM and increase strength to improve the patients ability to lift, carry, reach and complete ADL's    15 min Manual Therapy: MFR posterior shoulder stabilizers, GH jt mobs grad II-III, PROM to tolerance, mobs with movement    Rationale: decrease pain, increase ROM, increase tissue extensibility and decrease trigger points to improve the patients ability to lift, carry, reach and complete ADL's      With   [] TE   [] TA   [] neuro   [] other: Patient Education: [x] Review HEP    [] Progressed/Changed HEP based on:   [] positioning   [] body mechanics   [] transfers   [] heat/ice application    [] other:      Other Objective/Functional Measures:   No pain with today's interventions    Pain Level (0-10 scale) post treatment: 0    ASSESSMENT/Changes in Function:     Patient will continue to benefit from skilled PT services to modify and progress therapeutic interventions, address functional mobility deficits, address ROM deficits, address strength deficits, analyze and address soft tissue restrictions, analyze and cue movement patterns, analyze and modify body mechanics/ergonomics and assess and modify postural abnormalities to attain remaining goals. []  See Plan of Care  []  See progress note/recertification  []  See Discharge Summary         Progress towards goals / Updated goals:  Able to advance several exercises. Discussed walking shoes.     PLAN  [x]  Upgrade activities as tolerated     [x]  Continue plan of care  [x]  Update interventions per flow sheet       []  Discharge due to:_  []  Other:_      Sunny Hope, PT, DPT 12/14/2020

## 2020-12-17 ENCOUNTER — HOSPITAL ENCOUNTER (OUTPATIENT)
Dept: PHYSICAL THERAPY | Age: 65
Discharge: HOME | End: 2020-12-17
Payer: MEDICARE

## 2020-12-17 PROCEDURE — 97110 THERAPEUTIC EXERCISES: CPT | Performed by: PHYSICAL THERAPIST

## 2020-12-17 NOTE — PROGRESS NOTES
PT DAILY TREATMENT NOTE - [facility] 2-15    Patient Name: Gavi Esparza  Date:2020  : 1955  [x]  Patient  Verified  Payor: [place] MEDICARE / Plan: [place] MEDICARE PART A & B / Product Type: Medicare /    In time: 11:45 AM  Out time: 12:35 AM  Total Treatment Time (min): 50  Total Timed Codes (min): 50  1:1 Treatment Time ( only): 40  Visit #:  4    Treatment Area: Bilateral shoulder pain [M25.511, M25.512]    SUBJECTIVE  Pain Level (0-10 scale): 0  Any medication changes, allergies to medications, adverse drug reactions, diagnosis change, or new procedure performed?: [x] No    [] Yes (see summary sheet for update)  Subjective functional status/changes:   [] No changes reported  Patient reports he has had 3 good nights of sleep    OBJECTIVE    Modality rationale: decrease pain and increase tissue extensibility to improve the patients ability to lift, carry, reach and complete ADL's   Min Type Additional Details       [] Estim: []Att   []Unatt    []TENS instruct                  []IFC  []Premod   []NMES                     []Other:  []w/US   []w/ice   []w/heat  Position:  Location:       []  Traction: [] Cervical       []Lumbar                       [] Prone          []Supine                       []Intermittent   []Continuous Lbs:  [] before manual  [] after manual  []w/heat    []  Ultrasound: []Continuous   [] Pulsed                       at: []1MHz   []3MHz Location:  W/cm2:    [] Paraffin         Location:   []w/heat   0 []  Ice     [x]  Heat  []  Ice massage Position: supine  Location: bilateral shoulder    []  Laser  []  Other: Position:  Location:      []  Vasopneumatic Device Pressure:       [] lo [] med [] hi   Temperature:      [x] Skin assessment post-treatment:  [x]intact []redness- no adverse reaction    []redness  adverse reaction:     50 min Therapeutic Exercise:  [x] See flow sheet :   Rationale: increase ROM and increase strength to improve the patients ability to lift, carry, reach and complete ADL's    0 min Manual Therapy: MFR posterior shoulder stabilizers, GH jt mobs grad II-III, PROM to tolerance, mobs with movement    Rationale: decrease pain, increase ROM, increase tissue extensibility and decrease trigger points to improve the patients ability to lift, carry, reach and complete ADL's      With   [] TE   [] TA   [] neuro   [] other: Patient Education: [x] Review HEP    [] Progressed/Changed HEP based on:   [] positioning   [] body mechanics   [] transfers   [] heat/ice application    [] other:      Other Objective/Functional Measures:   Full and pain free shoulder PROM    Pain Level (0-10 scale) post treatment: 0    ASSESSMENT/Changes in Function:     Patient will continue to benefit from skilled PT services to modify and progress therapeutic interventions, address functional mobility deficits, address ROM deficits, address strength deficits, analyze and address soft tissue restrictions, analyze and cue movement patterns, analyze and modify body mechanics/ergonomics and assess and modify postural abnormalities to attain remaining goals. []  See Plan of Care  []  See progress note/recertification  []  See Discharge Summary         Progress towards goals / Updated goals:  Updated HEP.  Several exercises advanced without an increase in pain    PLAN  [x]  Upgrade activities as tolerated     [x]  Continue plan of care  [x]  Update interventions per flow sheet       []  Discharge due to:_  []  Other:_      Ian Howell, PT, DPT 12/17/2020

## 2021-01-27 ENCOUNTER — TELEPHONE (OUTPATIENT)
Dept: NEUROLOGY | Age: 66
End: 2021-01-27

## 2021-01-27 NOTE — TELEPHONE ENCOUNTER
Received: Today  Message Contents   MD Lali Lacey, April M, LPN   Caller: Unspecified (Today,  2:15 PM)             Yes. He can get the COVID vaccine         Called patient back and left a detailed message with MDs advice.

## 2021-01-27 NOTE — TELEPHONE ENCOUNTER
----- Message from Danlio Jj sent at 1/27/2021  2:04 PM EST -----  Regarding: Dr Barbra Reed Message/Vendor Calls    Caller's first and last name: N/A      Reason for call: Pt would like to discuss whether he should or should not get the COVID vaccine      Callback required yes/no and why: yes, to discuss whether he should or should not get the COVID vaccine       Best contact number(s): [phone]      Details to clarify the request: Pt would like to discuss whether he should or should not get the COVID vaccine      Danilo Jj

## 2021-02-05 DIAGNOSIS — G70.00 MYASTHENIA GRAVIS (HCC): ICD-10-CM

## 2021-02-05 DIAGNOSIS — G70.00 OCULAR MYASTHENIA GRAVIS (HCC): ICD-10-CM

## 2021-02-07 RX ORDER — MYCOPHENOLATE MOFETIL 500 MG/1
TABLET ORAL
Qty: 360 TAB | Refills: 3 | Status: SHIPPED | OUTPATIENT
Start: 2021-02-07 | End: 2021-09-13 | Stop reason: DRUGHIGH

## 2021-02-10 ENCOUNTER — HOSPITAL ENCOUNTER (OUTPATIENT)
Dept: PHYSICAL THERAPY | Age: 66
Discharge: HOME | End: 2021-02-10
Payer: MEDICARE

## 2021-02-10 PROCEDURE — 97110 THERAPEUTIC EXERCISES: CPT | Performed by: PHYSICAL THERAPIST

## 2021-02-10 NOTE — PROGRESS NOTES
PT DAILY TREATMENT NOTE - [facility] 2-15    Patient Name: Ary Bell  Date:2/10/2021  : 1955  [x]  Patient  Verified  Payor: [place] MEDICARE / Plan: [place] MEDICARE PART A & B / Product Type: Medicare /    In time:1:50 PM  Out time:2:55 PM  Total Treatment Time (min): 65  Total Timed Codes (min): 65  1:1 Treatment Time ( [address] only): 35   Visit #:  5    Treatment Area: Bilateral shoulder pain [M25.511, M25.512]    SUBJECTIVE  Pain Level (0-10 scale): 1/10 \"less than 1\"  Any medication changes, allergies to medications, adverse drug reactions, diagnosis change, or new procedure performed?: [x] No    [] Yes (see summary sheet for update)  Subjective functional status/changes:   [] No changes reported  Pt reported that he initially felt a pop doing B ER at the wall but it felt good. He then felt another pop recently and it caused a lot of pain and he was unable to sleep at night because of it. He is able to mobilize his shoulder back in place. He reported numbness in his 4th and 5th digit after using the iPad. He also feels tingling down his arm when resting his elbow on a surface. OBJECTIVE    65 min Therapeutic Exercise:  [x] See flow sheet :   Rationale: increase ROM and increase strength to improve the patients ability to complete ADLs without an increase in pain           With   [x] TE   [] TA   [] Neuro   [] SC   [] other: Patient Education: [x] Review HEP    [] Progressed/Changed HEP based on:   [] positioning   [] body mechanics   [] transfers   [] heat/ice application    [] other:      Other Objective/Functional Measures:     Shoulder AROM:     R  L  Flexion 165  155  Abduction  165  165  Extension  75  65  ER   120  110  IR  T7  T12 p!      Cervical AROM:  Flexion  40  Extension 45  Sidebending 25  30  Rotation  45  55    UE MMT:  All testing 5/5, except 4+/5 on L side for C6    Pain Level (0-10 scale) post treatment: nothing reported above baseline     ASSESSMENT/Changes in Function:   Pt had good tolerance of session today with emphasis on reviewing exercises from previous sessions, in order to perform at the beach in his therapy gap. Good performance of strengthening exercises today, was able to perform all exercises without an increase in pain. Good demonstration of MMT and AROM, showing improved scores since initial visit. Pt continues to be limited by AROM with LUE and cervical spine towards right side. Pt limited by pain with L popping in chest and creation of neurological symptoms. However, pt tested negative with neurodynamic exercises. Will continue to monitor and address his limitations with emphasis on improving ROM and strength in order for him to perform ADLs without an increase in pain. Patient will continue to benefit from skilled PT services to modify and progress therapeutic interventions, address functional mobility deficits, address ROM deficits, address strength deficits, analyze and cue movement patterns, analyze and modify body mechanics/ergonomics and instruct in home and community integration to attain remaining goals.      [x]  See Plan of Care  [x]  See progress note/recertification  []  See Discharge Summary         Progress towards goals / Updated goals:  See recertification    PLAN  [x]  Upgrade activities as tolerated     [x]  Continue plan of care  [x]  Update interventions per flow sheet       []  Discharge due to:_  []  Other:_      Yeyo Parham, SPT 2/10/2021   2 TE

## 2021-02-10 NOTE — PROGRESS NOTES
Physical Therapy at [facility],   a part of [address]  [address] [facility], [address]  Phone: [phone]      Fax:  [phone]    Continued Plan of Care/ Re-certification for Physical Therapy Services    Michell Guillermo1955   Bre Pereira MD   Provider # [id number]                    Diagnosis: Bilateral shoulder pain [M25.511, M25.512]  Onset Date: January 2019  Prior Hospitalization: None since start of care     Visits from Start of Care: 5     Missed Visits: 0  Start of Care: 10/26/21  Prior Level of Function: Retired.  Pt enjoys playing golf, going to his beach house.      The Plan of Care and following information is based on the patient's current status: Pt has not been seen in 2 months secondary to being out of town. He has made excellent progress towards goals but requests additional therapy to gain strength and mobility to decrease chance of pain recurrence. He has met all short term goals and 2/3 long term goals. Short Term Goals: To be accomplished in 4 weeks:  1) The patient will be independent with introductory HEP- MET  2) The patient will improve L shoulder flexion AROM to 145 without an increase in pain - MET  3) The patient will improve L shoulder extension to 65 deg without an increase in pain - MET  Long Term Goals:  To be accomplished in 12 weeks:  1) The patient will report ability to sleep through the night without an increase in pain - MET  2) The patient will demonstrate pain free shoulder AROM [facility] multidirectionally without an increase in pain to improve ease with household chores - MET  3) The patient will demonstrate ability to lift 10# overhead without an increase in pain - NOT MET    Key functional changes: Improved shoulder mobility for improved ease with lifting tasks and household chores      Problems/ barriers to goal attainment: Flare up one month ago when he \"over did it\" with one of his exercises Problem List: pain affecting function, decrease ROM, decrease strength, edema affecting function, impaired gait/ balance, decrease ADL/ functional abilitiies, decrease activity tolerance, decrease flexibility/ joint mobility and decrease transfer abilities    Treatment Plan: Therapeutic exercise, Therapeutic activities, Neuromuscular re-education, Physical agent/modality, Gait/balance training, Manual therapy, Patient education, Self Care training, Functional mobility training, Home safety training and Stair training     Patient Goal (s) has been updated and includes: \"to get better mobility and get stronger so that I don't have any more problems going forward\"       Goals for this certification period to be accomplished in 12 weeks:  1) The patient will be independent with finalized strengthening HEP  2) The patient will improve L shoulder flexion AROM to 160 deg to improve ease reaching top shelves  3) The patient will demonstrate ability to lift 10# overhead without an increase in pain    Frequency / Duration: Patient to be seen 1 times per week for 12 weeks:    Assessment / Recommendations: The patient returns from 8 week gap in care with improved shoulder ROM and activity tolerance. He had one flare up of pain during his time completing his homework independently and would like to get stronger so the pain does not come back again. The patient would benefit from continued PT 1x/ week for up to 12 additional visits to reach updated goals. Certification Period: 2/10/21-5/10/21    Jne Solis, PT 2/10/2021    ________________________________________________________________________  I certify that the above Therapy Services are being furnished while the patient is under my care. I agree with the treatment plan and certify that this therapy is necessary. Y or N I have read the above and request that my patient continue as recommended.   Y or N I have read the above report and request that my patient continue therapy with the following changes/special instructions  Y or N I have read the above report and request that my patient be discharged from therapy    Physician's Signature:_________________ Date:___________Time:__________

## 2021-03-01 ENCOUNTER — OFFICE VISIT (OUTPATIENT)
Dept: NEUROLOGY | Age: 66
End: 2021-03-01
Payer: MEDICARE

## 2021-03-01 VITALS
DIASTOLIC BLOOD PRESSURE: 72 MMHG | BODY MASS INDEX: 34.16 KG/M2 | OXYGEN SATURATION: 99 % | WEIGHT: 244 LBS | TEMPERATURE: 97.3 F | SYSTOLIC BLOOD PRESSURE: 120 MMHG | HEIGHT: 71 IN

## 2021-03-01 DIAGNOSIS — G70.00 MYASTHENIA GRAVIS (HCC): ICD-10-CM

## 2021-03-01 DIAGNOSIS — G70.00 MYASTHENIA GRAVIS (HCC): Primary | ICD-10-CM

## 2021-03-01 PROCEDURE — G8427 DOCREV CUR MEDS BY ELIG CLIN: HCPCS | Performed by: PSYCHIATRY & NEUROLOGY

## 2021-03-01 PROCEDURE — 99215 OFFICE O/P EST HI 40 MIN: CPT | Performed by: PSYCHIATRY & NEUROLOGY

## 2021-03-01 PROCEDURE — G8432 DEP SCR NOT DOC, RNG: HCPCS | Performed by: PSYCHIATRY & NEUROLOGY

## 2021-03-01 PROCEDURE — G8536 NO DOC ELDER MAL SCRN: HCPCS | Performed by: PSYCHIATRY & NEUROLOGY

## 2021-03-01 PROCEDURE — 1101F PT FALLS ASSESS-DOCD LE1/YR: CPT | Performed by: PSYCHIATRY & NEUROLOGY

## 2021-03-01 PROCEDURE — G8419 CALC BMI OUT NRM PARAM NOF/U: HCPCS | Performed by: PSYCHIATRY & NEUROLOGY

## 2021-03-01 PROCEDURE — 3017F COLORECTAL CA SCREEN DOC REV: CPT | Performed by: PSYCHIATRY & NEUROLOGY

## 2021-03-01 PROCEDURE — G8754 DIAS BP LESS 90: HCPCS | Performed by: PSYCHIATRY & NEUROLOGY

## 2021-03-01 PROCEDURE — G8752 SYS BP LESS 140: HCPCS | Performed by: PSYCHIATRY & NEUROLOGY

## 2021-03-01 NOTE — LETTER
3/1/2021 Patient: John Muir YOB: 1955 Date of Visit: 3/1/2021 Ava Mei MD 
[facility] [address] [id number] [address] Fax: [phone] Dear Ava Mei MD, Thank you for referring Mr. Kathya Lange to [facility] for evaluation. My notes for this consultation are attached. If you have questions, please do not hesitate to call me. I look forward to following your patient along with you. Sincerely, Felipe Rausch MD

## 2021-03-01 NOTE — PROGRESS NOTES
Neurology Progress Note    Patient ID:  Hipolito Stevenson  [id number]  35 y.o.  1955      Subjective:   History:  Mr. Hipolito Stevenson is a  Male with Asthma; H/O seasonal allergies; Hyperlipemia; and Hypertension who was admitted at [address] [place] [address] for [address] started last 8/6/16, noticeable on R gaze, associated with R eye discomfort. Patient (+) AChR Abs. CT chest negative for thymoma. His allergy to ragweed can cause flare ups described as increased blurred vision and has to adjust his Prednisone. On Glynn 3, 2020, patient had open thymectomy (due to large thymus) complicated with pneumothorax.     Patient is now on Prednisone 5 mg every day, Cellcept 500 mg 2 tabs BID, Timespan 180 mg QHS and Mestinon prn and has been doing well with only some problem standing up from sitting position but may be related to hip issues. Getting physical therapy for L shoulder with benefit.         ROS:  Per HPI-  Otherwise the remainder of ROS was negative    Social Hx  Social History     Socioeconomic History    Marital status:      Spouse name: Not on file    Number of children: Not on file    Years of education: Not on file    Highest education level: Not on file   Tobacco Use    Smoking status: Former Smoker    Smokeless tobacco: Never Used   Substance and Sexual Activity    Alcohol use: No    Drug use: Not Currently       Meds:  Current Outpatient Medications on File Prior to Visit   Medication Sig Dispense Refill    mycophenolate (CELLCEPT) 500 mg tablet TAKE 2 TABLETS BY MOUTH TWICE DAILY 360 Tab 3    amitriptyline (ELAVIL) 25 mg tablet       pyridostigmine bromide (Mestinon Timespan) 180 mg SR tablet Take 1 Tab by mouth nightly.  90 Tab 3    pyridostigmine (MESTINON) 60 mg tablet TAKE 1 TABLET BEFORE MEALS, AFTER MEALS AND AT BEDTIME (MAY TAKE UP TO 7 TIMES DAILY) 630 Tab 3    predniSONE (DELTASONE) 10 mg tablet TAKE 4 TABLETS BY MOUTH DAILY (Patient taking differently: 5 mg daily.) 360 Tab 3    finasteride (PROSCAR) 5 mg tablet Take 5 mg by mouth daily. 3    gabapentin (NEURONTIN) 600 mg tablet Take  by mouth daily as needed.  furosemide (LASIX) 20 mg tablet Take 20 mg by mouth daily.  fluticasone (FLONASE) 50 mcg/actuation nasal spray 2 Sprays by Both Nostrils route daily as needed.  montelukast (SINGULAIR) 10 mg tablet Take 10 mg by mouth daily.  levocetirizine (XYZAL) 5 mg tablet Take 5 mg by mouth daily.  albuterol (PROVENTIL HFA) 90 mcg/actuation inhaler Take 2 Puffs by inhalation every four (4) hours as needed.  cholecalciferol (VITAMIN D3) 1,000 unit cap Take 1,000 Units by mouth daily.  amLODIPine-atorvastatin (CADUET) 10-20 mg per tablet Take 1 Tab by mouth nightly.  irbesartan (AVAPRO) 300 mg tablet Take 300 mg by mouth nightly.  fenofibric acid (TRILIPIX ER) 135 mg capsule Take 135 mg by mouth nightly.  niacin-inositol (NIACIN FLUSH FREE) 400 mg niacin (500 mg) cap Take 1 Cap by mouth nightly.  potassium chloride SR (KLOR-CON 10) 10 mEq tablet Take 10 mEq by mouth two (2) times a day.  fluticasone-salmeterol (ADVAIR DISKUS) 250-50 mcg/dose diskus inhaler Take 1 Puff by inhalation every twelve (12) hours. No current facility-administered medications on file prior to visit. Imaging:    CT Results (recent):  Results from Hospital Encounter encounter on 05/24/19   CT CHEST W CONT    Narrative Indication: Myasthenia gravis evaluate for thymoma. COMPARISON: 10/11/2016    Multiple axial images were obtained from the lung apices to the adrenal glands  following the uneventful administration of 100 mL of Isovue 300. Images  reformatted in sagittal coronal plane. CT dose reduction was achieved through  use of a standardized protocol tailored for this examination and automatic  exposure control for dose modulation. The thyroid gland is unremarkable. No axillary adenopathy. There is no mediastinal or hilar adenopathy.  No evidence for anterior  mediastinal mass or thymoma. Small pericardial effusion has decreased. No  pleural effusions.    The lungs are clear of an acute process. The 3 mm pulmonary nodule in the left  upper lobe with smooth margins is stable. No new pulmonary nodules.    Review of bone windows is unremarkable.    Incidental imaging through the upper abdomen reveals a new oval-shaped  low-density possibly cystic lesion adjacent to the pancreas and possibly arising  from the pancreas. This projects just inferior to the stomach as well. Exact  origin of this is uncertain. This was not present previously. This measures 2.2  x 1.6 cm. Further evaluation is suggested. MRI of the abdomen is more  information.    In the anterior aspect of the spleen is a new rounded hypodensity measuring 8  mm. This was not present previously. Posteriorly in the spleen is a small  hypodensity that has not changed significantly when compared to previous exam.    No adrenal lesions.      Impression 1. No evidence for thymoma  2. Stable pulmonary nodule in the left upper lobe.  3. There is a new low density probably cystic lesion in the upper abdomen  adjacent to the pancreas and stomach. MRI of the pancreas is suggested. Exact  origin of this new lesion is uncertain.  4. New small hypodensity in the spleen too small to characterize. 23X           MRI Results (recent):  Results from Hospital Encounter encounter on 03/19/20   MRI ABD W WO CONT    Narrative EXAM:  MRI ABD W WO CONT    INDICATION:   Follow-up pancreatic cyst    COMPARISON: 6/18/2019    CONTRAST:  10 mL of Gadavist.    TECHNIQUE:   MR of the abdomen was performed and the following sequences were obtained:  Coronal HASTE, multiplanar MRCP, axial in and out-of-phase T1, axial T1  fat-saturated, axial T2, and pre- and post contrast T1-weighted images.      FINDINGS:  The liver is normal. There is a 5 mm cyst in segment 2 . Patient status post  cholecystectomy.  The spleen is normal.  There are 2 tiny cysts measuring 5 mm  and 6 mm. There is no pancreatic ductal dilatation. . There is a 4 x 2 mm cyst in the  pancreatic head. There is a 1.5 x 0.6 cm cyst in pancreatic neck. There is a 2.1  x 1.7 cm cyst projected off of the pancreatic body superiorly. There is a 1.1 x  0.3 cm cyst in the pancreatic body inferiorly. There is a 1.0 x 0.7cm cyst in  pancreatic tail. There is no adrenal lesion. .  The kidneys are without evidence of mass lesion or  hydronephrosis. . Right renal cyst.    There is no intra or extrahepatic biliary ductal dilatation. No biliary  strictures or filling defects are seen. There is no pancreas divisum. There is no lymphadenopathy or ascites. Impression IMPRESSION:    Stable scattered pancreatic T2 hyperdense foci the largest measuring 2.1 x 1.7  cm projected off the pancreatic body superiorly and most likely represent side  branch type IPMN. The remainder of the examination is also stable. No acute process is identified. Follow-up imaging in 12-24 months is suggested. IR Results (recent):  No results found for this or any previous visit. VAS/US Results (recent):  No results found for this or any previous visit. Reviewed records in [employer] and media tab today    Lab Review     No visits with results within 3 Month(s) from this visit. Latest known visit with results is:   Hospital Outpatient Visit on 08/17/2020   Component Date Value Ref Range Status    SARS-CoV-2 08/17/2020 Not Detected  Not Detected   Final    Comment: (NOTE)  This test was developed and its performance characteristics  determined by [employer]. This test has not been FDA  cleared or approved. This test has been authorized by FDA under an  Emergency Use Authorization (EUA).  This test is only authorized for  the duration of time the declaration that circumstances exist  justifying the authorization of the emergency use of in vitro  diagnostic tests for detection of SARS-CoV-2 virus and/or diagnosis  of COVID-19 infection under section 564(b)(1) of the Act, 21 U. S.C.  430KUM-3(E)(7), unless the authorization is terminated or revoked  sooner. When diagnostic testing is negative, the possibility of a  false negative result should be considered in the context of a  patient's recent exposures and the presence of clinical signs and  symptoms consistent with COVID-19. An individual without symptoms of  COVID-19 and who is not shedding SARS-CoV-2 virus would expect to  have a negative (not detected) result in this assay. Performed                            At: [address] [id number]  Serge Stoddard MD MW:[phone]           Objective:       Exam:  Visit Vitals  /72   Temp 97.3 °F (36.3 °C)   Ht 5' 11\" (1.803 m)   Wt 110.7 kg (244 lb)   SpO2 99%   BMI 34.03 kg/m²     Gen: Awake, alert, follows commands  Appropriate appearance, normal speech. Oriented to all spheres. No visual field defect on confrontation exam.  Full eyes movement, with no nystagmus, no diplopia, no ptosis. Normal gag and swallow. All remaining cranial nerves were normal  Motor function: 5/5 in all extremities  More ROM of L shoulder  Sensory: intact to LT, PP and JPS  Good FTN and HTS   Gait: Normal    Assessment:       ICD-10-CM ICD-9-CM    1. Myasthenia gravis ([facility] [address]  G70.00 358.00 CBC WITH AUTOMATED DIFF       Oculopharyngeal      (+) AChR Abs.     S/p thymectomy     MG ADL 1 (previously 5)     L shoulder tightness and pain, possible frozen shoulder s/p thymectomy      Plan:   1. Continue physical therapy to work on L shoulder  2. Check CBC. Patient will get it done via his PCP  3. Continue Prednisone 5 mg every day   4. Conitnue Cellcept 500 mg 2 tabs BID  5. Will need to follow up with dermatologist to monitor for skin CA (history of Basal Cell CA) and \"lumps\"  6. Continue Mestinon 60 mg prn  7.  Continue Timespan 180 mg QHS  8. GI and bone prophylaxis    Follow-up and Dispositions    · Return in about 6 months (around 9/1/2021).                Yonis Painter MD  Diplomate, American Board of Psychiatry and Neurology  Diplomate, Neuromuscular Medicine  Diplomate, American Board of Electrodiagnostic Medicine

## 2021-03-02 ENCOUNTER — HOSPITAL ENCOUNTER (OUTPATIENT)
Dept: PHYSICAL THERAPY | Age: 66
Discharge: HOME | End: 2021-03-02
Payer: MEDICARE

## 2021-03-02 PROCEDURE — 97110 THERAPEUTIC EXERCISES: CPT | Performed by: PHYSICAL THERAPIST

## 2021-03-02 PROCEDURE — 97140 MANUAL THERAPY 1/> REGIONS: CPT | Performed by: PHYSICAL THERAPIST

## 2021-03-02 NOTE — PROGRESS NOTES
PT DAILY TREATMENT NOTE - [facility] 2-15    Patient Name: Zachary Nesha  Date:3/2/2021  : 1955  [x]  Patient  Verified  [place]or: [place] MEDICARE / Plan: [place] MEDICARE PART A & B / Product Type: Medicare /    In time:10:45 AM  Out time:11:50 AM  Total Treatment Time (min): 65  Total Timed Codes (min): 55  1:1 Treatment Time ( [address] only): 54   Visit #:  6    Treatment Area: Bilateral shoulder pain [M25.511, M25.512]    SUBJECTIVE  Pain Level (0-10 scale): 0/10  Any medication changes, allergies to medications, adverse drug reactions, diagnosis change, or new procedure performed?: [x] No    [] Yes (see summary sheet for update)  Subjective functional status/changes:   [] No changes reported  Pt reports that he was able to sleep on his side last night with no increase in pain. Reported rib/lower back pain after last session from being in the car ride for 4 hours. He saw a massage therapist last night and got some relief but it came back an hour later.      OBJECTIVE    Modality rationale: decrease inflammation and decrease pain to improve the patients ability to ambulate out of the facility without an increase in LBP   Min Type Additional Details       [] Estim: []Att   []Unatt    []TENS instruct                  []IFC  []Premod   []NMES                     []Other:  []w/US   []w/ice   []w/heat  Position:  Location:       []  Traction: [] Cervical       []Lumbar                       [] Prone          []Supine                       []Intermittent   []Continuous Lbs:  [] before manual  [] after manual  []w/heat    []  Ultrasound: []Continuous   [] Pulsed                       at: []1MHz   []3MHz Location:  W/cm2:    [] Paraffin         Location:   []w/heat   10 []  Ice     [x]  Heat  []  Ice massage Position: supine  Location: lumbar     []  Laser  []  Other: Position:  Location:      []  Vasopneumatic Device Pressure:       [] lo [] med [] hi   Temperature:      [x] Skin assessment post-treatment:  [x]intact []redness- no adverse reaction    []redness  adverse reaction:     45 min Therapeutic Exercise:  [x] See flow sheet :   Rationale: increase ROM and increase strength to improve the patients ability to complete ADLs without an increase in pain. 10 min Manual Therapy: joint mob, L3-4 transverse vertebrae on R; Grade III    Rationale: increase ROM to improve the patients ability to complete ADLs without limitations          With   [] TE   [] TA   [] Neuro   [] SC   [] other: Patient Education: [x] Review HEP    [] Progressed/Changed HEP based on:   [] positioning   [] body mechanics   [] transfers   [] heat/ice application    [] other:      Other Objective/Functional Measures: Pt was able to complete increased reps on many exercises this session. Pain Level (0-10 scale) post treatment: 0/10    ASSESSMENT/Changes in Function:   Pt had good tolerance of session today with emphasis on increasing shoulder ROM and strength, to improve QOL. Good performance of exercises today with increased repetitions, was able to complete all reps without an increase in pain. Pt reported back pain today, it was found that L3-L5 transverse vertebrae was out of place. Joint mobs were performed to address this vertebrae, pt reported relief afterwards. Overall, pt is making progress in therapy with reduction of pain and improvements in ROM and repetitions he can complete. He would continue to benefit from skilled PT with emphasis on improving shoulder/back ROM and strength in order to complete ADLs without limitations. Patient will continue to benefit from skilled PT services to modify and progress therapeutic interventions, address functional mobility deficits, address ROM deficits, address strength deficits, analyze and cue movement patterns, analyze and modify body mechanics/ergonomics and instruct in home and community integration to attain remaining goals.      [x]  See Plan of Care  []  See progress note/recertification  []  See Discharge Summary         Progress towards goals / Updated goals:  See above    PLAN  [x]  Upgrade activities as tolerated     [x]  Continue plan of care  [x]  Update interventions per flow sheet       []  Discharge due to:_  []  Other:_      Sammie Lopez, ALF 3/2/2021

## 2021-03-04 ENCOUNTER — HOSPITAL ENCOUNTER (OUTPATIENT)
Dept: PHYSICAL THERAPY | Age: 66
Discharge: HOME | End: 2021-03-04
Payer: MEDICARE

## 2021-03-04 PROCEDURE — 97110 THERAPEUTIC EXERCISES: CPT | Performed by: PHYSICAL THERAPIST

## 2021-03-04 NOTE — PROGRESS NOTES
PT DAILY TREATMENT NOTE - [facility] 2-15    Patient Name: Zaida Alex  Date:3/4/2021  : 1955  [x]  Patient  Verified  Payor: [place] MEDICARE / Plan: [place] MEDICARE PART A & B / Product Type: Medicare /    In time:10:45 AM  Out time:12:00 PM  Total Treatment Time (min): 75  Total Timed Codes (min): 65  1:1 Treatment Time ( [address] only): 60   Visit #:  7    Treatment Area: Bilateral shoulder pain [M25.511, M25.512]    SUBJECTIVE  Pain Level (0-10 scale): 0/10; irritation in the back  Any medication changes, allergies to medications, adverse drug reactions, diagnosis change, or new procedure performed?: [x] No    [] Yes (see summary sheet for update)  Subjective functional status/changes:   [] No changes reported  Pt reports that he feels the most irritation on the lateral side of his back. He felt better after last session but then the pain came back.      OBJECTIVE    Modality rationale: decrease inflammation and decrease pain to improve the patients ability to ambulate out of the facility without an increase in pain    Min Type Additional Details       [] Estim: []Att   []Unatt    []TENS instruct                  []IFC  []Premod   []NMES                     []Other:  []w/US   []w/ice   []w/heat  Position:  Location:       []  Traction: [] Cervical       []Lumbar                       [] Prone          []Supine                       []Intermittent   []Continuous Lbs:  [] before manual  [] after manual  []w/heat    []  Ultrasound: []Continuous   [] Pulsed                       at: []1MHz   []3MHz Location:  W/cm2:    [] Paraffin         Location:   []w/heat   10 []  Ice     [x]  Heat  []  Ice massage Position:   Location: lumbar    []  Laser  []  Other: Position:  Location:      []  Vasopneumatic Device Pressure:       [] lo [] med [] hi   Temperature:      [x] Skin assessment post-treatment:  [x]intact []redness- no adverse reaction    []redness  adverse reaction:     60 min Therapeutic Exercise:  [x] See flow sheet : Rationale: increase ROM and increase strength to improve the patients ability to complete ADLs without an increase in pain     5 min Manual Therapy: QL MFR    Rationale: decrease trigger points to improve the patients ability to ambulate without an increase in pain          With   [x] TE   [] TA   [] Neuro   [] SC   [] other: Patient Education: [x] Review HEP    [x] Progressed/Changed HEP based on:   [x] positioning   [x] body mechanics   [] transfers   [x] heat/ice application    [] other:      Other Objective/Functional Measures: Pt was able to complete two new exercises today without an increase in pain. Pain Level (0-10 scale) post treatment: nothing reported above baseline    ASSESSMENT/Changes in Function:   Pt has good tolerance of session today with emphasis on improving mobility of shoulders and back, in order to complete ADLs without an increase in pain. Good performance of new exercises today, was able to perform full reps without an increase in pain. Good demonstration of back exercises, was able to find exercises to address his QL tightness leading to increased pain. Pt will try to perform exercises before bed to reduce chances of waking up during the night due to pain. Overall, pt is showing progress in his shoulder pain, however continues to be limited by lower back pain. Will continue to benefit from skilled PT with emphasis on improving mobility and strength, in order to improve QOL. Patient will continue to benefit from skilled PT services to modify and progress therapeutic interventions, address functional mobility deficits, address ROM deficits, address strength deficits, analyze and cue movement patterns, analyze and modify body mechanics/ergonomics, address imbalance/dizziness and instruct in home and community integration to attain remaining goals.      [x]  See Plan of Care  []  See progress note/recertification  []  See Discharge Summary         Progress towards goals / Updated goals:  See above    PLAN  [x]  Upgrade activities as tolerated     [x]  Continue plan of care  [x]  Update interventions per flow sheet       []  Discharge due to:_  []  Other:_      Charito Mujica, ALF 3/4/2021   4 TE

## 2021-03-09 LAB
BASOPHILS # BLD AUTO: 0 X10E3/UL (ref 0–0.2)
BASOPHILS NFR BLD AUTO: 1 %
EOSINOPHIL # BLD AUTO: 0.1 X10E3/UL (ref 0–0.4)
EOSINOPHIL NFR BLD AUTO: 2 %
ERYTHROCYTE [DISTWIDTH] IN BLOOD BY AUTOMATED COUNT: 12.9 % (ref 11.6–15.4)
HCT VFR BLD AUTO: 41.3 % (ref 37.5–51)
HGB BLD-MCNC: 13.5 G/DL (ref 13–17.7)
IMM GRANULOCYTES # BLD AUTO: 0 X10E3/UL (ref 0–0.1)
IMM GRANULOCYTES NFR BLD AUTO: 1 %
LYMPHOCYTES # BLD AUTO: 1.9 X10E3/UL (ref 0.7–3.1)
LYMPHOCYTES NFR BLD AUTO: 35 %
MCH RBC QN AUTO: 29.5 PG (ref 26.6–33)
MCHC RBC AUTO-ENTMCNC: 32.7 G/DL (ref 31.5–35.7)
MCV RBC AUTO: 90 FL (ref 79–97)
MONOCYTES # BLD AUTO: 0.4 X10E3/UL (ref 0.1–0.9)
MONOCYTES NFR BLD AUTO: 7 %
NEUTROPHILS # BLD AUTO: 2.9 X10E3/UL (ref 1.4–7)
NEUTROPHILS NFR BLD AUTO: 54 %
PLATELET # BLD AUTO: 233 X10E3/UL (ref 150–450)
RBC # BLD AUTO: 4.58 X10E6/UL (ref 4.14–5.8)
WBC # BLD AUTO: 5.3 X10E3/UL (ref 3.4–10.8)

## 2021-03-11 ENCOUNTER — HOSPITAL ENCOUNTER (OUTPATIENT)
Dept: PHYSICAL THERAPY | Age: 66
Discharge: HOME | End: 2021-03-11
Payer: MEDICARE

## 2021-03-11 PROCEDURE — 97110 THERAPEUTIC EXERCISES: CPT

## 2021-03-11 NOTE — PROGRESS NOTES
PT DAILY TREATMENT NOTE - [facility] 2-15    Patient Name: Rahul Lott  Date:3/11/2021  : 1955  [x]  Patient  Verified  Payor: [place] MEDICARE / Plan: VA MEDICARE PART A & B / Product Type: Medicare /    In time:10:45a  Out time:11: 40a  Total Treatment Time (min): 55  Total Timed Codes (min): 45  1:1 Treatment Time ( [address] only): 45   Visit #:  8    Treatment Area: Bilateral shoulder pain [M25.511, M25.512]    SUBJECTIVE  Pain Level (0-10 scale): 0/10; irritation in the back  Any medication changes, allergies to medications, adverse drug reactions, diagnosis change, or new procedure performed?: [x] No    [] Yes (see summary sheet for update)  Subjective functional status/changes:   [] No changes reported  Patient reports he is tired as he did not get much sleep last night, but not due to shoulders or back. Patient reports he still has some irritation through his left mid back.       OBJECTIVE    Modality rationale: decrease inflammation and decrease pain to improve the patients ability to ambulate out of the facility without an increase in pain    Min Type Additional Details       [] Estim: []Att   []Unatt    []TENS instruct                  []IFC  []Premod   []NMES                     []Other:  []w/US   []w/ice   []w/heat  Position:  Location:       []  Traction: [] Cervical       []Lumbar                       [] Prone          []Supine                       []Intermittent   []Continuous Lbs:  [] before manual  [] after manual  []w/heat    []  Ultrasound: []Continuous   [] Pulsed                       at: []1MHz   []3MHz Location:  W/cm2:    [] Paraffin         Location:   []w/heat   10 []  Ice     [x]  Heat  []  Ice massage Position:   Location: lumbar    []  Laser  []  Other: Position:  Location:      []  Vasopneumatic Device Pressure:       [] lo [] med [] hi   Temperature:      [x] Skin assessment post-treatment:  [x]intact []redness- no adverse reaction    []redness  adverse reaction:     45 min Therapeutic Exercise:  [x] See flow sheet :   Rationale: increase ROM and increase strength to improve the patients ability to complete ADLs without an increase in pain           With   [x] TE   [] TA   [] Neuro   [] SC   [] other: Patient Education: [x] Review HEP    [x] Progressed/Changed HEP based on:   [x] positioning   [x] body mechanics   [] transfers   [x] heat/ice application    [] other:      Other Objective/Functional Measures: good tolerance for advanced interventions    Pain Level (0-10 scale) post treatment: 0    ASSESSMENT/Changes in Function:   Pt able to advance several exercises to include PNF movements and improve reaching across body movements. Patient will continue to benefit from skilled PT services to modify and progress therapeutic interventions, address functional mobility deficits, address ROM deficits, address strength deficits, analyze and cue movement patterns, analyze and modify body mechanics/ergonomics, address imbalance/dizziness and instruct in home and community integration to attain remaining goals. []  See Plan of Care  []  See progress note/recertification  []  See Discharge Summary         Progress towards goals / Updated goals:  Patient making steady improvement and will do well with continued progression as tolerated.      PLAN  [x]  Upgrade activities as tolerated     [x]  Continue plan of care  [x]  Update interventions per flow sheet       []  Discharge due to:_  []  Other:_      Nonda Nip, PTA 3/11/2021

## 2021-03-18 ENCOUNTER — HOSPITAL ENCOUNTER (OUTPATIENT)
Dept: PHYSICAL THERAPY | Age: 66
Discharge: HOME | End: 2021-03-18
Payer: MEDICARE

## 2021-03-18 ENCOUNTER — TRANSCRIBE ORDER (OUTPATIENT)
Dept: SCHEDULING | Age: 66
End: 2021-03-18

## 2021-03-18 DIAGNOSIS — D37.9 NEOPLASM OF UNCERTAIN BEHAVIOR OF DIGESTIVE ORGAN: ICD-10-CM

## 2021-03-18 DIAGNOSIS — D49.0 INTRADUCTAL PAPILLARY MUCINOUS NEOPLASM OF PANCREAS: Primary | ICD-10-CM

## 2021-03-18 PROCEDURE — 97110 THERAPEUTIC EXERCISES: CPT | Performed by: PHYSICAL THERAPIST

## 2021-03-18 NOTE — PROGRESS NOTES
PT DAILY TREATMENT NOTE - [facility] 2-15    Patient Name: Bill Varghese  Date:3/18/2021  : 1955  [x]  Patient  Verified  Payor: [place] MEDICARE / Plan: [place] MEDICARE PART A & B / Product Type: Medicare /    In time:10:45 AM  Out time:11:40 AM  Total Treatment Time (min): 55  Total Timed Codes (min): 55  1:1 Treatment Time ( [address] only): 54   Visit #:  9    Treatment Area: Bilateral shoulder pain [M25.511, M25.512]    SUBJECTIVE  Pain Level (0-10 scale): 0/10  Any medication changes, allergies to medications, adverse drug reactions, diagnosis change, or new procedure performed?: [x] No    [] Yes (see summary sheet for update)  Subjective functional status/changes:   [] No changes reported  Pt reported that he did a lot of yard work over the past week. His back was a little tight but with his back exercises, he was able to stretch out his back after. OBJECTIVE    55 min Therapeutic Exercise:  [x] See flow sheet :   Rationale: increase ROM and increase strength to improve the patients ability to complete ADLs without an increase in pain. With   [x] TE   [] TA   [] Neuro   [] SC   [] other: Patient Education: [x] Review HEP    [x] Progressed/Changed HEP based on:   [x] positioning   [x] body mechanics   [] transfers   [] heat/ice application    [] other:      Other Objective/Functional Measures: Pt was able to increase sets      Pain Level (0-10 scale) post treatment: 0/10    ASSESSMENT/Changes in Function:   Pt had good tolerance of session today with emphasis on improving ROM and strength, in order to complete ADLs without an increase in pain. Good performance on strengthening exercises today, was able to progress to more reps. Good demonstration mobility exercises, added new exercise this session to prevent back extension with shoulder flexion; pt responded well. Overall, pt is showing progress in therapy but continues to be limited by inability to achieve normal ROM without back extension.  Would continue to benefit from skilled PT with emphasis on improving mobility and strength of back and shoulders, in order to improve QOL. Patient will continue to benefit from skilled PT services to modify and progress therapeutic interventions, address functional mobility deficits, address ROM deficits, address strength deficits, analyze and cue movement patterns, analyze and modify body mechanics/ergonomics and instruct in home and community integration to attain remaining goals.      []  See Plan of Care  [x]  See progress note/recertification  []  See Discharge Summary         Progress towards goals / Updated goals:  See progress note    PLAN  [x]  Upgrade activities as tolerated     [x]  Continue plan of care  [x]  Update interventions per flow sheet       []  Discharge due to:_  []  Other:_      ALF Barrientos 3/18/2021   4 TE

## 2021-03-18 NOTE — PROGRESS NOTES
Physical Therapy at [facility],   a part of  [facility]  [address] [facility], [address] [place] [address] Drive  Phone: [phone]      Fax:  [phone]    Progress Note    Name: Zachary Jeffries   : 1955   MD: Rome Wyatt MD       Treatment Diagnosis: Bilateral shoulder pain [M25.511, M25.512]  Start of Care: 10/26/21    Visits from Start of Care: 9  Missed Visits: 0    Summary of Care: therapeutic exercise, therapeutic activity, manual therapy, modalities     Assessment / Recommendations:   Wendy Chew is a pleasant 74 y.o. male who presented to OPPT with bilateral shoulder pain and LBP. He has completed 9 visits thus far and has made significant improvements. He has met 1/3 of his goals for this 12 weeks. He continues to be limited by shoulder ROM without use of back extension. However, he has reported being pain free for weeks now. He would continue to benefit from skilled PT with emphasis on improving lower back/bilateral shoulder ROM and strength, in order to improve QOL.      Goals  to be accomplished in 12 weeks:  1) The patient will be independent with finalized strengthening HEP - NOT MET  2) The patient will improve L shoulder flexion AROM to 160 deg to improve ease reaching top shelves - NOT MET; achieved 155 degrees   3) The patient will demonstrate ability to lift 10# overhead without an increase in pain - MET        Matthew Gaitan, ALF 3/18/2021

## 2021-03-25 ENCOUNTER — HOSPITAL ENCOUNTER (OUTPATIENT)
Dept: MRI IMAGING | Age: 66
Discharge: HOME | End: 2021-03-25
Attending: SPECIALIST
Payer: MEDICARE

## 2021-03-25 ENCOUNTER — HOSPITAL ENCOUNTER (OUTPATIENT)
Dept: PHYSICAL THERAPY | Age: 66
Discharge: HOME | End: 2021-03-25
Payer: MEDICARE

## 2021-03-25 VITALS — BODY MASS INDEX: 33.89 KG/M2 | WEIGHT: 243 LBS

## 2021-03-25 DIAGNOSIS — D37.9 NEOPLASM OF UNCERTAIN BEHAVIOR OF DIGESTIVE ORGAN: ICD-10-CM

## 2021-03-25 DIAGNOSIS — D49.0 INTRADUCTAL PAPILLARY MUCINOUS NEOPLASM OF PANCREAS: ICD-10-CM

## 2021-03-25 PROCEDURE — 74183 MRI ABD W/O CNTR FLWD CNTR: CPT

## 2021-03-25 PROCEDURE — A9585 GADOBUTROL INJECTION: HCPCS | Performed by: RADIOLOGY

## 2021-03-25 PROCEDURE — 74011250636 HC RX REV CODE- 250/636: Performed by: RADIOLOGY

## 2021-03-25 PROCEDURE — 77030021566

## 2021-03-25 PROCEDURE — 97110 THERAPEUTIC EXERCISES: CPT

## 2021-03-25 RX ADMIN — GADOBUTROL 10 ML: 604.72 INJECTION INTRAVENOUS at 13:14

## 2021-03-25 NOTE — PROGRESS NOTES
PT DAILY TREATMENT NOTE - [facility] 2-15    Patient Name: Troy Cazares  Date:3/25/2021  : 1955  [x]  Patient  Verified  Payor: [place] MEDICARE / Plan: [place] MEDICARE PART A & B / Product Type: Medicare /    In time:10:45a  Out time:11:30a  Total Treatment Time (min): 45  Total Timed Codes (min): 45  1:1 Treatment Time ( [address] only): 35   Visit #:  10    Treatment Area: Bilateral shoulder pain [M25.511, M25.512]    SUBJECTIVE  Pain Level (0-10 scale): 0/10  Any medication changes, allergies to medications, adverse drug reactions, diagnosis change, or new procedure performed?: [x] No    [] Yes (see summary sheet for update)  Subjective functional status/changes:   [] No changes reported  Patient reports no issues since last visit and feels he is able to reach higher without increased pain or tightness. OBJECTIVE    45 min Therapeutic Exercise:  [x] See flow sheet :   Rationale: increase ROM and increase strength to improve the patients ability to complete ADLs without an increase in pain. With   [x] TE   [] TA   [] Neuro   [] SC   [] other: Patient Education: [x] Review HEP    [x] Progressed/Changed HEP based on:   [x] positioning   [x] body mechanics   [] transfers   [] heat/ice application    [] other:      Other Objective/Functional Measures:       Pain Level (0-10 scale) post treatment: 0/10    ASSESSMENT/Changes in Function:   Patient able to advance several overhead reaching exercises with mod to max shoulder fatigue. Patient given new exercises for HEP    Patient will continue to benefit from skilled PT services to modify and progress therapeutic interventions, address functional mobility deficits, address ROM deficits, address strength deficits, analyze and cue movement patterns, analyze and modify body mechanics/ergonomics and instruct in home and community integration to attain remaining goals.      []  See Plan of Care  []  See progress note/recertification  []  See Discharge Summary Progress towards goals / Updated goals:  Patient will do well with continued progression to reach remaining goals and improved functional mobility.      PLAN  [x]  Upgrade activities as tolerated     [x]  Continue plan of care  [x]  Update interventions per flow sheet       []  Discharge due to:_  []  Other:_      Sotero Grey, PTA 3/25/2021

## 2021-03-29 NOTE — LETTER
10/15/20 Patient: Lit Evans YOB: 1955 Date of Visit: 10/15/2020 Shaista Vitale MD 
[facility] [address] [place] [id number] VIA Facsimile: [phone] Dear Shaista Vitale MD, Thank you for referring Mr. Te Freeman to [facility] for evaluation. My notes for this consultation are attached. If you have questions, please do not hesitate to call me. I look forward to following your patient along with you. Sincerely, Daniel Vang MD 
 
 received pt from evening nurse, pt a&0, airway patent and shows no signs of respiratory distress, pt is in the hallway and has no complaints at this time, pending admission to the floor, bed in lowest position.

## 2021-04-01 ENCOUNTER — HOSPITAL ENCOUNTER (OUTPATIENT)
Dept: PHYSICAL THERAPY | Age: 66
Discharge: HOME | End: 2021-04-01
Payer: MEDICARE

## 2021-04-01 PROCEDURE — 97110 THERAPEUTIC EXERCISES: CPT | Performed by: PHYSICAL THERAPIST

## 2021-04-01 NOTE — PROGRESS NOTES
PT DAILY TREATMENT NOTE - [facility] 2-15    Patient Name: Griselda Gaines  Date:2021  : 1955  [x]  Patient  Verified  Payor: VA MEDICARE / Plan: VA MEDICARE PART A & B / Product Type: Medicare /    In time:10:40 AM  Out time:11:40 AM  Total Treatment Time (min): 60  Total Timed Codes (min): 60  1:1 Treatment Time ( only): 40   Visit #:  11    Treatment Area: Bilateral shoulder pain [M25.511, M25.512]    SUBJECTIVE  Pain Level (0-10 scale): 0/10  Any medication changes, allergies to medications, adverse drug reactions, diagnosis change, or new procedure performed?: [x] No    [] Yes (see summary sheet for update)  Subjective functional status/changes:   [] No changes reported  Pt reports that during his endoscopy, he had to lay on his side for a while. He feels stiff today and took medication for the stiffness. OBJECTIVE    60 min Therapeutic Exercise:  [x] See flow sheet :   Rationale: increase ROM and increase strength to improve the patients ability to complete ADLs without an increase in pain. With   [x] TE   [] TA   [] Neuro   [] SC   [] other: Patient Education: [x] Review HEP    [x] Progressed/Changed HEP based on:   [x] positioning   [x] body mechanics   [] transfers   [] heat/ice application    [] other:      Other Objective/Functional Measures: Pt was able to perform multiple strengthening exercises with black TB this session. Was able to complete full sets and reps without pain. Pain Level (0-10 scale) post treatment: \"I feel a lot better\"    ASSESSMENT/Changes in Function:   Pt had good tolerance of session today with emphasis on improving mobility and strength of shoulders, in order to complete ADLs without an increase in pain. Good performance of strengthening exercises, was able to progress to increased resistance this session on multiple exercises without an increase in pain. Good demonstration of mobility exercises, was able to perform correctly and within a good range. Overall, pt is showing progress but continues to be limited by random flare ups. Will continue to benefit from skilled PT with emphasis on addressing limitations, in order to improve QOL. Patient will continue to benefit from skilled PT services to modify and progress therapeutic interventions, address functional mobility deficits, address ROM deficits, address strength deficits, analyze and modify body mechanics/ergonomics and instruct in home and community integration to attain remaining goals.      [x]  See Plan of Care  []  See progress note/recertification  []  See Discharge Summary         Progress towards goals / Updated goals:  See above    PLAN  [x]  Upgrade activities as tolerated     [x]  Continue plan of care  [x]  Update interventions per flow sheet       []  Discharge due to:_  []  Other:_      Jose Luis Dickson, SPT 4/1/2021   3 TE

## 2021-04-08 ENCOUNTER — HOSPITAL ENCOUNTER (OUTPATIENT)
Dept: PHYSICAL THERAPY | Age: 66
Discharge: HOME | End: 2021-04-08
Payer: MEDICARE

## 2021-04-08 PROCEDURE — 97110 THERAPEUTIC EXERCISES: CPT | Performed by: PHYSICAL THERAPIST

## 2021-04-08 NOTE — PROGRESS NOTES
PT DAILY TREATMENT NOTE - [facility] 2-15    Patient Name: Juan David Pearson  Date:2021  : 1955  [x]  Patient  Verified  Payor: VA MEDICARE / Plan: VA MEDICARE PART A & B / Product Type: Medicare /    In time:10:45 AM  Out time:11:40 AM  Total Treatment Time (min): 55  Total Timed Codes (min): 55  1:1 Treatment Time ( [address] only): 54  Visit #:  12    Treatment Area: Bilateral shoulder pain [M25.511, M25.512]    SUBJECTIVE  Pain Level (0-10 scale): 0/10  Any medication changes, allergies to medications, adverse drug reactions, diagnosis change, or new procedure performed?: [x] No    [] Yes (see summary sheet for update)  Subjective functional status/changes:   [] No changes reported  Pt reports he is feeling good and ready for d/c    OBJECTIVE    55 min Therapeutic Exercise:  [x] See flow sheet :   Rationale: increase ROM and increase strength to improve the patients ability to complete ADLs without an increase in pain.         With   [x] TE   [] TA   [] Neuro   [] SC   [] other: Patient Education: [x] Review HEP    [x] Progressed/Changed HEP based on:   [x] positioning   [x] body mechanics   [] transfers   [] heat/ice application    [] other:      Other Objective/Functional Measures:   Shoulder AROM  Flexion R 170 L 162    Pt able to push 10# weight over his head without an increase in pain B    Pain Level (0-10 scale) post treatment: 0    ASSESSMENT/Changes in Function:      []  See Plan of Care  []  See progress note/recertification  [x]  See Discharge Summary    PLAN  []  Upgrade activities as tolerated     []  Continue plan of care  []  Update interventions per flow sheet       [x]  Discharge due to:_ pt has met all short/ long term goals  []  Other:_      Chasity Menjivar, PT 2021

## 2021-04-08 NOTE — ANCILLARY DISCHARGE INSTRUCTIONS
Physical Therapy at [facility],  
a part of  [place] [address], [address] [place] Phone: [phone] Fax: [phone] Discharge Summary 2-15 Patient name: Maciel Poe  : 1955  Provider#: [id number] Referral source: Mylene Esposito MD     
Medical/Treatment Diagnosis: Bilateral shoulder pain [M25.511, M25.512] Prior Hospitalization: see medical history Comorbidities: See Plan of Care Prior Level of Function: See Plan of Care Medications: Verified on Patient Summary List 
 
Start of Care: 10/26/2020, updated POC 2/10/21      Onset Date:2019 Visits from Start of Care: 12        Missed Visits: 0 Reporting Period : 10/26/2020 to 21 Assessment/Summary of care: The patient has completed 12 physical therapy visits and has met all short and long term goals. The patient scored a 63% on the FOTO outcomes survey, a significant improvement from initial evaluation score of 83%. The patient has maximized therapeutic benefit at this time and is ready for discharge to independent HEP. Goals for this certification period (2/10/21-5/10/21) to be accomplished in 12 weeks:  
1) The patient will be independent with finalized strengthening HEP - MET 2) The patient will improve L shoulder flexion AROM to 160 deg to improve ease reaching top shelves - MET 3) The patient will demonstrate ability to lift 10# overhead without an increase in pain - MET 
 
 
RECOMMENDATIONS: 
[x]Discontinue therapy: [x]Patient has reached or is progressing toward set goals []Patient is non-compliant or has abdicated 
   []Due to lack of appreciable progress towards set goals []Other Kahlil Ricardo, PT 2021

## 2021-06-03 DIAGNOSIS — G70.00 OCULAR MYASTHENIA GRAVIS (HCC): ICD-10-CM

## 2021-06-03 DIAGNOSIS — G70.00 MYASTHENIA GRAVIS (HCC): ICD-10-CM

## 2021-06-03 RX ORDER — PYRIDOSTIGMINE BROMIDE 60 MG/1
TABLET ORAL
Qty: 630 TABLET | Refills: 3 | Status: SHIPPED | OUTPATIENT
Start: 2021-06-03

## 2021-06-09 LAB — HBA1C MFR BLD HPLC: 5.8 %

## 2021-09-13 ENCOUNTER — OFFICE VISIT (OUTPATIENT)
Dept: NEUROLOGY | Age: 66
End: 2021-09-13
Payer: MEDICARE

## 2021-09-13 VITALS
OXYGEN SATURATION: 99 % | TEMPERATURE: 97.4 F | WEIGHT: 245 LBS | HEART RATE: 97 BPM | SYSTOLIC BLOOD PRESSURE: 110 MMHG | BODY MASS INDEX: 34.17 KG/M2 | DIASTOLIC BLOOD PRESSURE: 68 MMHG

## 2021-09-13 DIAGNOSIS — G70.00 MYASTHENIA GRAVIS (HCC): ICD-10-CM

## 2021-09-13 DIAGNOSIS — G70.00 OCULAR MYASTHENIA GRAVIS (HCC): ICD-10-CM

## 2021-09-13 PROCEDURE — G8536 NO DOC ELDER MAL SCRN: HCPCS | Performed by: PSYCHIATRY & NEUROLOGY

## 2021-09-13 PROCEDURE — G8417 CALC BMI ABV UP PARAM F/U: HCPCS | Performed by: PSYCHIATRY & NEUROLOGY

## 2021-09-13 PROCEDURE — G8752 SYS BP LESS 140: HCPCS | Performed by: PSYCHIATRY & NEUROLOGY

## 2021-09-13 PROCEDURE — G8754 DIAS BP LESS 90: HCPCS | Performed by: PSYCHIATRY & NEUROLOGY

## 2021-09-13 PROCEDURE — 3017F COLORECTAL CA SCREEN DOC REV: CPT | Performed by: PSYCHIATRY & NEUROLOGY

## 2021-09-13 PROCEDURE — G8510 SCR DEP NEG, NO PLAN REQD: HCPCS | Performed by: PSYCHIATRY & NEUROLOGY

## 2021-09-13 PROCEDURE — 1101F PT FALLS ASSESS-DOCD LE1/YR: CPT | Performed by: PSYCHIATRY & NEUROLOGY

## 2021-09-13 PROCEDURE — G8427 DOCREV CUR MEDS BY ELIG CLIN: HCPCS | Performed by: PSYCHIATRY & NEUROLOGY

## 2021-09-13 PROCEDURE — 99214 OFFICE O/P EST MOD 30 MIN: CPT | Performed by: PSYCHIATRY & NEUROLOGY

## 2021-09-13 RX ORDER — PREDNISONE 5 MG/1
5 TABLET ORAL DAILY
Qty: 90 TABLET | Refills: 3 | Status: SHIPPED | OUTPATIENT
Start: 2021-09-13

## 2021-09-13 RX ORDER — PYRIDOSTIGMINE BROMIDE 180 MG/1
180 TABLET, EXTENDED RELEASE ORAL
Qty: 90 TABLET | Refills: 3 | Status: SHIPPED | OUTPATIENT
Start: 2021-09-13 | End: 2022-09-27

## 2021-09-13 RX ORDER — MYCOPHENOLATE MOFETIL 500 MG/1
1000 TABLET ORAL 2 TIMES DAILY
Qty: 360 TABLET | Refills: 3 | Status: SHIPPED | OUTPATIENT
Start: 2021-09-13 | End: 2022-10-18

## 2021-09-13 NOTE — PROGRESS NOTES
Chief Complaint   Patient presents with    Follow-up     Follow up myasthenia gravis, notes improvement     Visit Vitals  /68 (BP 1 Location: Left arm, BP Patient Position: Sitting, BP Cuff Size: Large adult)   Pulse 97   Temp 97.4 °F (36.3 °C) (Temporal)   Wt 111.1 kg (245 lb)   SpO2 99%   BMI 34.17 kg/m²

## 2021-09-13 NOTE — LETTER
9/13/2021    Patient: Montezuma Sniff   YOB: 1955   Date of Visit: 9/13/2021     Andry Horne MD  [address] [facility] [address]  Via Fax: [phone]    Dear Andry Horne MD,      Thank you for referring Mr. Coni Emmanuel to [facility] for evaluation. My notes for this consultation are attached. If you have questions, please do not hesitate to call me. I look forward to following your patient along with you.       Sincerely,    Balwinder Weinstein MD

## 2021-09-13 NOTE — PROGRESS NOTES
Neurology Progress Note    Patient ID:  Mary Carreon  [id number]  45 y.o.  1955      Subjective:   History:  Mr. Mary Carreon is a  Male with Asthma; H/O seasonal allergies; Hyperlipemia; and Hypertension who was admitted at [facility] for [address] started last 8/6/16, noticeable on R gaze, associated with R eye discomfort. Patient (+) AChR Abs. CT chest negative for thymoma. His allergy to ragweed can cause flare ups described as increased blurred vision and has to adjust his Prednisone. On Glynn 3, 2020, patient had open thymectomy (due to large thymus) complicated with pneumothorax.      Patient continues to take Prednisone 5 mg every day, Cellcept 500 mg 2 tabs BID, Timespan 180 mg QHS and Mestinon prn and has been doing well with only minimal problem with eyelid droop.     Getting physical therapy for L shoulder with benefit. ROS:  Per HPI-  Otherwise the remainder of ROS was negative    Social Hx  Social History     Socioeconomic History    Marital status:      Spouse name: Not on file    Number of children: Not on file    Years of education: Not on file    Highest education level: Not on file   Tobacco Use    Smoking status: Former Smoker    Smokeless tobacco: Never Used   Substance and Sexual Activity    Alcohol use: No    Drug use: Not Currently     Social Determinants of Health     Financial Resource Strain:     Difficulty of Paying Living Expenses:    Food Insecurity:     Worried About Running Out of Food in the Last Year:     [address] in the Last Year:    Transportation Needs:     Lack of Transportation (Medical):      Lack of Transportation (Non-Medical):    Physical Activity:     Days of Exercise per Week:     Minutes of Exercise per Session:    Stress:     Feeling of Stress :    Social Connections:     Frequency of Communication with Friends and Family:     Frequency of Social Gatherings with Friends and Family:     Attends [employer] Services:     Active Member of Clubs or Organizations:     Attends Club or Organization Meetings:     Marital Status:        Meds:  Current Outpatient Medications on File Prior to Visit   Medication Sig Dispense Refill    pyridostigmine (MESTINON) 60 mg tablet TAKE 1 TABLET BEFORE MEALS, AFTER MEALS AND AT BEDTIME (MAY TAKE UP TO 7 TIMES DAILY) 630 Tablet 3    amitriptyline (ELAVIL) 25 mg tablet       finasteride (PROSCAR) 5 mg tablet Take 5 mg by mouth daily. 3    furosemide (LASIX) 20 mg tablet Take 20 mg by mouth daily. Takes 40mg on Mon and Tues      fluticasone (FLONASE) 50 mcg/actuation nasal spray 2 Sprays by Both Nostrils route daily as needed.  montelukast (SINGULAIR) 10 mg tablet Take 10 mg by mouth daily.  levocetirizine (XYZAL) 5 mg tablet Take 5 mg by mouth daily.  cholecalciferol (VITAMIN D3) 1,000 unit cap Take 1,000 Units by mouth daily.  amLODIPine-atorvastatin (CADUET) 10-20 mg per tablet Take 1 Tab by mouth nightly.  irbesartan (AVAPRO) 300 mg tablet Take 300 mg by mouth nightly.  fenofibric acid (TRILIPIX ER) 135 mg capsule Take 135 mg by mouth nightly.  niacin-inositol (NIACIN FLUSH FREE) 400 mg niacin (500 mg) cap Take 1 Cap by mouth nightly.  potassium chloride SR (KLOR-CON 10) 10 mEq tablet Take 10 mEq by mouth two (2) times a day.  gabapentin (NEURONTIN) 600 mg tablet Take  by mouth daily as needed. (Patient not taking: Reported on 9/13/2021)      albuterol (PROVENTIL HFA) 90 mcg/actuation inhaler Take 2 Puffs by inhalation every four (4) hours as needed. (Patient not taking: Reported on 9/13/2021)      fluticasone-salmeterol (ADVAIR DISKUS) 250-50 mcg/dose diskus inhaler Take 1 Puff by inhalation every twelve (12) hours. (Patient not taking: Reported on 9/13/2021)       No current facility-administered medications on file prior to visit.        Imaging:    CT Results (recent):  Results from Hospital Encounter encounter on 05/24/19    CT CHEST W CONT    Narrative  Indication: Myasthenia gravis evaluate for thymoma. COMPARISON: 10/11/2016    Multiple axial images were obtained from the lung apices to the adrenal glands  following the uneventful administration of 100 mL of Isovue 300. Images  reformatted in sagittal coronal plane. CT dose reduction was achieved through  use of a standardized protocol tailored for this examination and automatic  exposure control for dose modulation. The thyroid gland is unremarkable. No axillary adenopathy. There is no mediastinal or hilar adenopathy. No evidence for anterior  mediastinal mass or thymoma. Small pericardial effusion has decreased. No  pleural effusions. The lungs are clear of an acute process. The 3 mm pulmonary nodule in the left  upper lobe with smooth margins is stable. No new pulmonary nodules. Review of bone windows is unremarkable. Incidental imaging through the upper abdomen reveals a new oval-shaped  low-density possibly cystic lesion adjacent to the pancreas and possibly arising  from the pancreas. This projects just inferior to the stomach as well. Exact  origin of this is uncertain. This was not present previously. This measures 2.2  x 1.6 cm. Further evaluation is suggested. MRI of the abdomen is more  information. In the anterior aspect of the spleen is a new rounded hypodensity measuring 8  mm. This was not present previously. Posteriorly in the spleen is a small  hypodensity that has not changed significantly when compared to previous exam.    No adrenal lesions. Impression  1. No evidence for thymoma  2. Stable pulmonary nodule in the left upper lobe. 3. There is a new low density probably cystic lesion in the upper abdomen  adjacent to the pancreas and stomach. MRI of the pancreas is suggested. Exact  origin of this new lesion is uncertain. 4. New small hypodensity in the spleen too small to characterize.  23X      MRI Results (recent):  Results from [facility] encounter on 03/25/21    MRI ABD W WO CONT    Narrative  Procedure: MRI of the abdomen with MRCP    DATE: 3/25/2021 1:15 PM    TECHNIQUE: Multiplanar MRI of the abdomen was performed with and without  contrast including T2-weighted fat-sat and nonfat sat images, axial in phase and  out of phase imaging, and multiphase postcontrast imaging as well as 3-D MRCP. Contrast: 10 mL of Gadavist    COMPARISON: MRI dated 6/18/2019 and 3/19/2020    INDICATION: Intraductal papillary mucinous neoplasm    FINDINGS: Evaluation is limited by motion    Liver: No focal liver lesions. Gallbladder: Not visualized    Biliary tree: No biliary dilatation. Spleen: Small T2 hyperintense lesions in the spleen which are unchanged. Pancreas: T2 hyperintense lesions in the pancreas which are unchanged. The  exophytic lesion in the superior aspect of the body/tail junction is slightly  decreased in size. Adrenals: Unremarkable    Kidneys: Nonenhancing T2 hyperintense lesion in the right kidney consistent with  a simple cyst.    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 [email or web address]    [id number]      IR Results (recent):  No results found for this or any previous visit. VAS/US Results (recent):  No results found for this or any previous visit. Reviewed records in [facility] and [facility] tab today    Lab Review     No visits with results within 3 Month(s) from this visit.    Latest known visit with results is:   Orders Only on 03/01/2021   Component Date Value Ref Range Status    WBC 03/08/2021 5.3  3.4 - 10.8 x10E3/uL Final    RBC 03/08/2021 4.58  4.14 - 5.80 x10E6/uL Final    HGB 03/08/2021 13.5  13.0 - 17.7 g/dL Final    HCT 03/08/2021 41.3  37.5 - 51.0 % Final    MCV 03/08/2021 90  79 - 97 fL Final    MCH 03/08/2021 29.5  26.6 - 33.0 pg Final    MCHC 03/08/2021 32.7  31.5 - 35.7 g/dL Final    RDW 03/08/2021 12.9  11.6 - 15.4 % Final    PLATELET 93/68/1224 782  150 - 450 x10E3/uL Final    NEUTROPHILS 03/08/2021 54  Not Estab. % Final    Lymphocytes 03/08/2021 35  Not Estab. % Final    MONOCYTES 03/08/2021 7  Not Estab. % Final    EOSINOPHILS 03/08/2021 2  Not Estab. % Final    BASOPHILS 03/08/2021 1  Not Estab. % Final    ABS. NEUTROPHILS 03/08/2021 2.9  1.4 - 7.0 x10E3/uL Final    Abs Lymphocytes 03/08/2021 1.9  0.7 - 3.1 x10E3/uL Final    ABS. MONOCYTES 03/08/2021 0.4  0.1 - 0.9 x10E3/uL Final    ABS. EOSINOPHILS 03/08/2021 0.1  0.0 - 0.4 x10E3/uL Final    ABS. BASOPHILS 03/08/2021 0.0  0.0 - 0.2 x10E3/uL Final    IMMATURE GRANULOCYTES 03/08/2021 1  Not Estab. % Final    ABS. IMM. GRANS. 03/08/2021 0.0  0.0 - 0.1 x10E3/uL Final         Objective:       Exam:  Visit Vitals  /68 (BP 1 Location: Left arm, BP Patient Position: Sitting, BP Cuff Size: Large adult)   Pulse 97   Temp 97.4 °F (36.3 °C) (Temporal)   Wt 111.1 kg (245 lb)   SpO2 99%   BMI 34.17 kg/m²     Gen: Awake, alert, follows commands  Appropriate appearance, normal speech. Oriented to all spheres. No visual field defect on confrontation exam.  Full eyes movement, with no nystagmus, no diplopia, no ptosis. Normal gag and swallow. All remaining cranial nerves were normal  Motor function: 5/5 in all extremities  Sensory: intact to LT, PP and JPS  Good FTN and HTS   Gait: Normal    Assessment:       ICD-10-CM ICD-9-CM    1. Ocular myasthenia gravis (HCC)  G70.00 358.00 mycophenolate (CELLCEPT) 500 mg tablet   2. Myasthenia gravis ([facility] [address]  G70.00 358.00 mycophenolate (CELLCEPT) 500 mg tablet       Oculopharyngeal      (+) AChR Abs.     S/p thymectomy     MG ADL 1 (previously 1)     L shoulder tightness and pain, possible frozen shoulder s/p thymectomy     Plan:   1. Check CBC. Patient will get it done via his PCP  2. Patient will try stopping Timespan 180 to see if he needs it at all and will take regular Mestinon 60 mg prn instead. Will update me. 3. Continue Prednisone 5 mg every day. Will consider reducing Prednisone to 2.5 mg every day on next follow up depending on above  4. Conitnue Cellcept 500 mg 2 tabs BID. Monitor for toxicity. 5. Will need to follow up with dermatologist to monitor for skin CA (history of Basal Cell CA) and \"lumps\"  6. GI and bone prophylaxis        Follow-up and Dispositions    · Return in about 1 year (around 9/13/2022).                Nicholas Brar MD  Diplomate, American Board of Psychiatry and Neurology  Diplomate, Neuromuscular Medicine  Diplomate, American Board of Electrodiagnostic Medicine

## 2022-04-21 ENCOUNTER — TRANSCRIBE ORDER (OUTPATIENT)
Dept: SCHEDULING | Age: 67
End: 2022-04-21

## 2022-04-21 DIAGNOSIS — D49.0 INTRADUCTAL PAPILLARY MUCINOUS NEOPLASM: Primary | ICD-10-CM

## 2022-05-10 ENCOUNTER — HOSPITAL ENCOUNTER (OUTPATIENT)
Dept: MRI IMAGING | Age: 67
Discharge: HOME | End: 2022-05-10
Attending: SPECIALIST
Payer: MEDICARE

## 2022-05-10 VITALS — WEIGHT: 245 LBS | BODY MASS INDEX: 34.17 KG/M2

## 2022-05-10 DIAGNOSIS — D49.0 INTRADUCTAL PAPILLARY MUCINOUS NEOPLASM: ICD-10-CM

## 2022-05-10 PROCEDURE — 74183 MRI ABD W/O CNTR FLWD CNTR: CPT

## 2022-05-10 PROCEDURE — 77030021566

## 2022-05-10 PROCEDURE — 74011250636 HC RX REV CODE- 250/636: Performed by: RADIOLOGY

## 2022-05-10 PROCEDURE — A9576 INJ PROHANCE MULTIPACK: HCPCS | Performed by: RADIOLOGY

## 2022-05-10 RX ADMIN — GADOTERIDOL 20 ML: 279.3 INJECTION, SOLUTION INTRAVENOUS at 13:41

## 2022-09-13 ENCOUNTER — TELEPHONE (OUTPATIENT)
Dept: NEUROLOGY | Age: 67
End: 2022-09-13

## 2022-09-13 NOTE — TELEPHONE ENCOUNTER
Called pt. Verified. Inform pt that the appt scheduled for 9/15/22 at 11:00AM has to be rescheduled due to Dr. Vani Nevarez is on call and will not be in until 1:00PM. Pt verbalizes understanding and ask to call office back to reschedule.

## 2022-09-26 NOTE — PROGRESS NOTES
Neurology Progress Note    Patient ID:  Bill Varghese  [id number]  24 y.o.  1955      Subjective:   History:  Mr. Bill Varghese is a  Male with Asthma; H/O seasonal allergies; Hyperlipemia; and Hypertension who was admitted at [address] [place] [address] for diplopia that started last 8/6/16, noticeable on R gaze, associated with R eye discomfort. Patient (+) AChR Abs. CT chest negative for thymoma. His allergy to ragweed can cause flare ups described as increased blurred vision and has to adjust his Prednisone. On Glynn 3, 2020, patient had open thymectomy (due to large thymus) complicated with pneumothorax. Patient now off Carmie Nissen and takes Mestinon 60 mg BID, Prednisone 5 mg every day,  and Cellcept 500 mg 2 tabs BID and has been doing well with only minimal problem with eyelid droop. ROS:  Per HPI-  Otherwise the remainder of ROS was negative    Social Hx  Social History     Socioeconomic History    Marital status:    Tobacco Use    Smoking status: Former    Smokeless tobacco: Never   Substance and Sexual Activity    Alcohol use: No    Drug use: Not Currently       Meds:  Current Outpatient Medications on File Prior to Visit   Medication Sig Dispense Refill    mycophenolate (CELLCEPT) 500 mg tablet Take 2 Tablets by mouth two (2) times a day. 360 Tablet 3    predniSONE (DELTASONE) 5 mg tablet Take 1 Tablet by mouth daily. 90 Tablet 3    pyridostigmine (MESTINON) 60 mg tablet TAKE 1 TABLET BEFORE MEALS, AFTER MEALS AND AT BEDTIME (MAY TAKE UP TO 7 TIMES DAILY) 630 Tablet 3    amitriptyline (ELAVIL) 25 mg tablet       finasteride (PROSCAR) 5 mg tablet Take 5 mg by mouth daily. 3    furosemide (LASIX) 20 mg tablet Take 20 mg by mouth daily. Takes 40mg on Mon and Tues      fluticasone propionate (FLONASE) 50 mcg/actuation nasal spray 2 Sprays by Both Nostrils route daily as needed. montelukast (SINGULAIR) 10 mg tablet Take 10 mg by mouth daily.       levocetirizine (XYZAL) 5 mg tablet Take 5 mg by mouth daily.      cholecalciferol (VITAMIN D3) 25 mcg (1,000 unit) cap Take 1,000 Units by mouth daily. amLODIPine-atorvastatin (CADUET) 10-20 mg per tablet Take 1 Tab by mouth nightly. irbesartan (AVAPRO) 300 mg tablet Take 300 mg by mouth nightly. fenofibric acid (TRILIPIX ER) 135 mg capsule Take 135 mg by mouth nightly. niacin-inositol 400 mg niacin (500 mg) cap Take 1 Cap by mouth nightly. potassium chloride SR (KLOR-CON 10) 10 mEq tablet Take 10 mEq by mouth two (2) times a day. pyridostigmine bromide (Mestinon Timespan) 180 mg SR tablet Take 1 Tablet by mouth nightly. 90 Tablet 3     No current facility-administered medications on file prior to visit. Imaging:    CT Results (recent):  Results from Hospital Encounter encounter on 05/24/19    CT CHEST W CONT    Narrative  Indication: Myasthenia gravis evaluate for thymoma. COMPARISON: 10/11/2016    Multiple axial images were obtained from the lung apices to the adrenal glands  following the uneventful administration of 100 mL of Isovue 300. Images  reformatted in sagittal coronal plane. CT dose reduction was achieved through  use of a standardized protocol tailored for this examination and automatic  exposure control for dose modulation. The thyroid gland is unremarkable. No axillary adenopathy. There is no mediastinal or hilar adenopathy. No evidence for anterior  mediastinal mass or thymoma. Small pericardial effusion has decreased. No  pleural effusions. The lungs are clear of an acute process. The 3 mm pulmonary nodule in the left  upper lobe with smooth margins is stable. No new pulmonary nodules. Review of bone windows is unremarkable. Incidental imaging through the upper abdomen reveals a new oval-shaped  low-density possibly cystic lesion adjacent to the pancreas and possibly arising  from the pancreas. This projects just inferior to the stomach as well. Exact  origin of this is uncertain.  This was not present previously. This measures 2.2  x 1.6 cm. Further evaluation is suggested. MRI of the abdomen is more  information. In the anterior aspect of the spleen is a new rounded hypodensity measuring 8  mm. This was not present previously. Posteriorly in the spleen is a small  hypodensity that has not changed significantly when compared to previous exam.    No adrenal lesions. Impression  1. No evidence for thymoma  2. Stable pulmonary nodule in the left upper lobe. 3. There is a new low density probably cystic lesion in the upper abdomen  adjacent to the pancreas and stomach. MRI of the pancreas is suggested. Exact  origin of this new lesion is uncertain. 4. New small hypodensity in the spleen too small to characterize. 23X      MRI Results (recent):  Results from Hospital Encounter encounter on 05/10/22    MRI ABD W MRCP W WO CONT    Narrative  EXAM: MRI ABD W MRCP W WO CONT    INDICATION: Follow-up pancreatic lesion. COMPARISON: MRI 12/25/2021. CONTRAST: 20 mL ProHance IV. TECHNIQUE: Multisequence and multiplanar MRI of the abdomen was performed prior  to and after the administration of IV contrast. Multiphase postcontrast imaging  obtained. Heavily T2-weighted thick slab and thin slice images were obtained in  the oblique coronal plane through the biliary tree (MRCP). FINDINGS:    VISUALIZED LOWER CHEST: Susceptibility of median sternotomy wires noted. Otherwise unremarkable. LIVER: Unremarkable. GALLBLADDER: Unremarkable. BILIARY DUCTS: The bile ducts are nondilated with no evident filling defect,  stricture, or mucosal abnormality. SPLEEN: Unremarkable. PANCREAS: No significant change in multiple signal intensity cystic lesions of  the pancreas measuring 14 mm in the head, 18 mm in the body, and 7 mm in the  tail. No ductal dilation or new lesion. No enhancing mass or suspicious  enhancing nodularity. ADRENALS: Unremarkable.   KIDNEYS: No significant change in simple fluid signal intensity interpolar cyst  of the right kidney for which no further follow-up is thought to be required. No  enhancing mass, hydronephrosis, or other abnormality. STOMACH: Unremarkable. VISUALIZED BOWEL: No dilatation or wall thickening. PERITONEUM: No ascites or pneumoperitoneum. RETROPERITONEUM: No lymphadenopathy or aortic aneurysm. VISUALIZED PELVIS: Unremarkable. BONES AND SOFT TISSUES: No acute fracture or aggressive lesion. ADDITIONAL COMMENTS: N/A    Impression  No significant change or evidence of malignant transformation in  multiple pancreatic cystic lesions likely reflective of intraductal papillary  mucinous neoplasms. IR Results (recent):  No results found for this or any previous visit. VAS/US Results (recent):  No results found for this or any previous visit. Reviewed records in [facility] and [facility] tab today    Lab Review     Abstract on 08/04/2022   Component Date Value Ref Range Status    Hemoglobin A1c, External 06/09/2021 5.8  % Final         Objective:       Exam:  Visit Vitals  /60   Pulse 98   Ht 5' 11\" (1.803 m)   Wt 107 kg (236 lb)   SpO2 99%   BMI 32.92 kg/m²     Gen: Awake, alert, follows commands  Appropriate appearance, normal speech. Oriented to all spheres. No visual field defect on confrontation exam.  Full eyes movement, with no nystagmus, no diplopia, no ptosis. Normal gag and swallow. All remaining cranial nerves were normal  Motor function: 5/5 in all extremities  Sensory: intact to LT, PP and JPS  DTRs ++ in all extremities, (-) Babinski  Good FTN and HTS   Gait: Normal    Assessment:       ICD-10-CM ICD-9-CM    1. Ocular myasthenia gravis (HCC)  G70.00 358.00 CBC WITH AUTOMATED DIFF      CBC WITH AUTOMATED DIFF          Oculopharyngeal      (+) AChR Abs. S/p thymectomy     MG ADL 1 (previously 1)     L shoulder tightness and pain, possible frozen shoulder s/p thymectomy     Plan:   1. Will check CBC.    2. Continue Mestinon 60 mg BID and 1 tab extra prn  3. Continue Prednisone 5 mg every day. 4. Conitnue Cellcept 500 mg 2 tabs BID. Monitor for toxicity.    5. Will need to follow up with dermatologist to monitor for skin CA (history of Basal Cell CA) and \"lumps\"  6. GI and bone prophylaxis                      Akosua Moffett MD  Diplomate, American Board of Psychiatry and Neurology  Diplomate, Neuromuscular Medicine  Diplomate, American Board of Electrodiagnostic Medicine

## 2022-09-27 ENCOUNTER — OFFICE VISIT (OUTPATIENT)
Dept: NEUROLOGY | Age: 67
End: 2022-09-27
Payer: MEDICARE

## 2022-09-27 VITALS
HEART RATE: 98 BPM | SYSTOLIC BLOOD PRESSURE: 120 MMHG | DIASTOLIC BLOOD PRESSURE: 60 MMHG | BODY MASS INDEX: 33.04 KG/M2 | HEIGHT: 71 IN | OXYGEN SATURATION: 99 % | WEIGHT: 236 LBS

## 2022-09-27 DIAGNOSIS — G70.00 OCULAR MYASTHENIA GRAVIS (HCC): Primary | ICD-10-CM

## 2022-09-27 PROCEDURE — G8510 SCR DEP NEG, NO PLAN REQD: HCPCS | Performed by: PSYCHIATRY & NEUROLOGY

## 2022-09-27 PROCEDURE — G8754 DIAS BP LESS 90: HCPCS | Performed by: PSYCHIATRY & NEUROLOGY

## 2022-09-27 PROCEDURE — G8536 NO DOC ELDER MAL SCRN: HCPCS | Performed by: PSYCHIATRY & NEUROLOGY

## 2022-09-27 PROCEDURE — 1101F PT FALLS ASSESS-DOCD LE1/YR: CPT | Performed by: PSYCHIATRY & NEUROLOGY

## 2022-09-27 PROCEDURE — 1123F ACP DISCUSS/DSCN MKR DOCD: CPT | Performed by: PSYCHIATRY & NEUROLOGY

## 2022-09-27 PROCEDURE — G8419 CALC BMI OUT NRM PARAM NOF/U: HCPCS | Performed by: PSYCHIATRY & NEUROLOGY

## 2022-09-27 PROCEDURE — G8752 SYS BP LESS 140: HCPCS | Performed by: PSYCHIATRY & NEUROLOGY

## 2022-09-27 PROCEDURE — G8427 DOCREV CUR MEDS BY ELIG CLIN: HCPCS | Performed by: PSYCHIATRY & NEUROLOGY

## 2022-09-27 PROCEDURE — 3017F COLORECTAL CA SCREEN DOC REV: CPT | Performed by: PSYCHIATRY & NEUROLOGY

## 2022-09-27 PROCEDURE — 99214 OFFICE O/P EST MOD 30 MIN: CPT | Performed by: PSYCHIATRY & NEUROLOGY

## 2022-10-08 LAB
BASOPHILS # BLD AUTO: 0.1 X10E3/UL (ref 0–0.2)
BASOPHILS NFR BLD AUTO: 1 %
EOSINOPHIL # BLD AUTO: 0 X10E3/UL (ref 0–0.4)
EOSINOPHIL NFR BLD AUTO: 0 %
ERYTHROCYTE [DISTWIDTH] IN BLOOD BY AUTOMATED COUNT: 13.5 % (ref 11.6–15.4)
HCT VFR BLD AUTO: 40.7 % (ref 37.5–51)
HGB BLD-MCNC: 13.4 G/DL (ref 13–17.7)
IMM GRANULOCYTES # BLD AUTO: 0.1 X10E3/UL (ref 0–0.1)
IMM GRANULOCYTES NFR BLD AUTO: 1 %
LYMPHOCYTES # BLD AUTO: 1.9 X10E3/UL (ref 0.7–3.1)
LYMPHOCYTES NFR BLD AUTO: 23 %
MCH RBC QN AUTO: 29.8 PG (ref 26.6–33)
MCHC RBC AUTO-ENTMCNC: 32.9 G/DL (ref 31.5–35.7)
MCV RBC AUTO: 90 FL (ref 79–97)
MONOCYTES # BLD AUTO: 0.4 X10E3/UL (ref 0.1–0.9)
MONOCYTES NFR BLD AUTO: 5 %
NEUTROPHILS # BLD AUTO: 5.7 X10E3/UL (ref 1.4–7)
NEUTROPHILS NFR BLD AUTO: 70 %
PLATELET # BLD AUTO: 274 X10E3/UL (ref 150–450)
RBC # BLD AUTO: 4.5 X10E6/UL (ref 4.14–5.8)
WBC # BLD AUTO: 8.1 X10E3/UL (ref 3.4–10.8)

## 2022-10-10 ENCOUNTER — TELEPHONE (OUTPATIENT)
Dept: NEUROLOGY | Age: 67
End: 2022-10-10

## 2022-10-10 NOTE — TELEPHONE ENCOUNTER
----- Message from Enrique Roy MD sent at 10/8/2022  2:25 PM EDT -----  Pls inform patient CBC looks okay    ----- Message -----  From: Maxime Lablaila Lab Results In  Sent: 10/8/2022   7:37 AM EDT  To: Enrique Roy MD

## 2022-10-17 DIAGNOSIS — G70.00 MYASTHENIA GRAVIS (HCC): ICD-10-CM

## 2022-10-17 DIAGNOSIS — G70.00 OCULAR MYASTHENIA GRAVIS (HCC): ICD-10-CM

## 2022-10-18 RX ORDER — MYCOPHENOLATE MOFETIL 500 MG/1
TABLET ORAL
Qty: 360 TABLET | Refills: 3 | Status: SHIPPED | OUTPATIENT
Start: 2022-10-18

## 2023-06-19 ENCOUNTER — CLINICAL DOCUMENTATION (OUTPATIENT)
Age: 68
End: 2023-06-19

## 2023-10-12 RX ORDER — MYCOPHENOLATE MOFETIL 500 MG/1
1000 TABLET ORAL 2 TIMES DAILY
Qty: 360 TABLET | Refills: 1 | Status: SHIPPED | OUTPATIENT
Start: 2023-10-12

## 2023-11-09 NOTE — PROGRESS NOTES
MG tablet Take 1 tablet by mouth nightly      levocetirizine (XYZAL) 5 MG tablet Take 1 tablet by mouth daily      montelukast (SINGULAIR) 10 MG tablet Take 1 tablet by mouth daily      Niacin-Inositol 400-100 MG CAPS Take 1 capsule by mouth      potassium chloride (KLOR-CON) 10 MEQ extended release tablet Take 1 tablet by mouth 2 times daily      predniSONE (DELTASONE) 5 MG tablet Take 1 tablet by mouth daily      pyridostigmine (MESTINON) 60 MG tablet TAKE 1 TABLET BEFORE MEALS, AFTER MEALS AND AT BEDTIME (MAY TAKE UP TO 7 TIMES DAILY)       No current facility-administered medications on file prior to visit. Imaging:    CT Results (recent):  @NexPlanarT([id number]:1)@    MRI Results (recent):  @NuevolutionSTIMGCAT([id number]:1)@    IR Results (recent):  @NuevolutionSTIMGCAT([id number]:1)@    VAS/US Results (recent):  @NuevolutionSTIMGCAT([id number]:1)@    Reviewed records in [facility] and [facility] tab today    Lab Review     No visits with results within 3 Month(s) from this visit. Latest known visit with results is:   Office Visit on 09/27/2022   Component Date Value Ref Range Status    WBC 10/07/2022 8.1  3.4 - 10.8 x10E3/uL Final    RBC 10/07/2022 4.50  4. 14 - 5.80 x10E6/uL Final    Hemoglobin 10/07/2022 13.4  13.0 - 17.7 g/dL Final    Hematocrit 10/07/2022 40.7  37.5 - 51.0 % Final    MCV 10/07/2022 90  79 - 97 fL Final    MCH 10/07/2022 29.8  26.6 - 33.0 pg Final    MCHC 10/07/2022 32.9  31.5 - 35.7 g/dL Final    RDW 10/07/2022 13.5  11.6 - 15.4 % Final    Platelets 27/27/5778 274  150 - 450 x10E3/uL Final    Neutrophils % 10/07/2022 70  Not Estab. % Final    Lymphocytes % 10/07/2022 23  Not Estab. % Final    Monocytes % 10/07/2022 5  Not Estab. % Final    Eosinophils % 10/07/2022 0  Not Estab. % Final    Basophils % 10/07/2022 1  Not Estab. % Final    Neutrophils Absolute 10/07/2022 5.7  1.4 - 7.0 x10E3/uL Final    Lymphocytes Absolute 10/07/2022 1.9  0.7 - 3.1 x10E3/uL Final    Monocytes Absolute 10/07/2022 0.4  0.1 -

## 2023-11-10 ENCOUNTER — OFFICE VISIT (OUTPATIENT)
Age: 68
End: 2023-11-10
Payer: MEDICARE

## 2023-11-10 VITALS
HEART RATE: 60 BPM | TEMPERATURE: 97 F | SYSTOLIC BLOOD PRESSURE: 110 MMHG | HEIGHT: 71 IN | OXYGEN SATURATION: 100 % | DIASTOLIC BLOOD PRESSURE: 60 MMHG | WEIGHT: 228 LBS | BODY MASS INDEX: 31.92 KG/M2

## 2023-11-10 DIAGNOSIS — G70.00 MYASTHENIA GRAVIS WITHOUT (ACUTE) EXACERBATION (HCC): Primary | ICD-10-CM

## 2023-11-10 PROCEDURE — 99214 OFFICE O/P EST MOD 30 MIN: CPT | Performed by: PSYCHIATRY & NEUROLOGY

## 2023-11-10 PROCEDURE — 3078F DIAST BP <80 MM HG: CPT | Performed by: PSYCHIATRY & NEUROLOGY

## 2023-11-10 PROCEDURE — 3074F SYST BP LT 130 MM HG: CPT | Performed by: PSYCHIATRY & NEUROLOGY

## 2023-11-10 PROCEDURE — 1123F ACP DISCUSS/DSCN MKR DOCD: CPT | Performed by: PSYCHIATRY & NEUROLOGY

## 2024-04-10 RX ORDER — MYCOPHENOLATE MOFETIL 500 MG/1
1000 TABLET ORAL 2 TIMES DAILY
Qty: 360 TABLET | Refills: 1 | Status: SHIPPED | OUTPATIENT
Start: 2024-04-10

## 2024-06-05 NOTE — PROGRESS NOTES
Jolie Severe is a 77 y.o. male who was seen by synchronous (real-time) audio-video technology on 4/20/2020. Subjective:   Mr. Jolie Severe is a  Male with Asthma; H/O seasonal allergies; Hyperlipemia; and Hypertension who was admitted at [address] [place] [address] for [address] started last 8/6/16, noticeable on R gaze, associated with R eye discomfort. Patient (+) AChR Abs. CT chest negative for thymoma. His allergy to ragweed can cause flare ups described as increased blurred vision and has to adjust his Prednisone. On Glynn 3, 2020, patient had open thymectomy (due to large thymus) complicated with pneumothorax. Cellept was also increased to 500 mg BID. Overall patient feels better. Still on Prednisone 10 mg every day.     Takes Mestinon 4-5 times/ day. ROS:  Per HPI-  Otherwise 12 point ROS was negative    Social Hx:  Social History     Socioeconomic History    Marital status:      Spouse name: Not on file    Number of children: Not on file    Years of education: Not on file    Highest education level: Not on file   Tobacco Use    Smoking status: Former Smoker    Smokeless tobacco: Never Used   Substance and Sexual Activity    Alcohol use: No       Meds:  Current Outpatient Medications on File Prior to Visit   Medication Sig Dispense Refill    mycophenolate (CELLCEPT) 500 mg tablet Take 2 Tabs by mouth two (2) times a day. 360 Tab 1    pyridostigmine (MESTINON) 60 mg tablet TAKE 1 TABLET BY MOUTH BEFORE MEALS, AFTER MEALS, AND AT BEDTIME. May take up to 7 times a day. 630 Tab 1    predniSONE (DELTASONE) 10 mg tablet TAKE 4 TABLETS BY MOUTH DAILY 360 Tab 3    amitriptyline (ELAVIL) 10 mg tablet Take 10 mg by mouth daily. 3    finasteride (PROSCAR) 5 mg tablet Take 5 mg by mouth daily. 3    gabapentin (NEURONTIN) 600 mg tablet Take  by mouth daily as needed.  furosemide (LASIX) 20 mg tablet Take 20 mg by mouth daily.       fluticasone (FLONASE) 50 mcg/actuation nasal spray 2 This encounter was created in error - please disregard.   Sprays by Both Nostrils route daily as needed.  montelukast (SINGULAIR) 10 mg tablet Take 10 mg by mouth daily.  levocetirizine (XYZAL) 5 mg tablet Take 5 mg by mouth daily.  albuterol (PROVENTIL HFA) 90 mcg/actuation inhaler Take 2 Puffs by inhalation every four (4) hours as needed.  cholecalciferol (VITAMIN D3) 1,000 unit cap Take 1,000 Units by mouth daily.  amLODIPine-atorvastatin (CADUET) 10-20 mg per tablet Take 1 Tab by mouth nightly.  irbesartan (AVAPRO) 300 mg tablet Take 300 mg by mouth nightly.  fenofibric acid (TRILIPIX ER) 135 mg capsule Take 135 mg by mouth nightly.  niacin-inositol (NIACIN FLUSH FREE) 400 mg niacin (500 mg) cap Take 1 Cap by mouth nightly.  potassium chloride SR (KLOR-CON 10) 10 mEq tablet Take 10 mEq by mouth two (2) times a day.  fluticasone-salmeterol (ADVAIR DISKUS) 250-50 mcg/dose diskus inhaler Take 1 Puff by inhalation every twelve (12) hours.  tamsulosin (FLOMAX) 0.4 mg capsule Take 0.4 mg by mouth as needed.  aspirin delayed-release 81 mg tablet Take  by mouth daily. No current facility-administered medications on file prior to visit. Imaging:    CT Results (recent):  Results from Hospital Encounter encounter on 05/24/19   CT CHEST W CONT    Narrative Indication: Myasthenia gravis evaluate for thymoma. COMPARISON: 10/11/2016    Multiple axial images were obtained from the lung apices to the adrenal glands  following the uneventful administration of 100 mL of Isovue 300. Images  reformatted in sagittal coronal plane. CT dose reduction was achieved through  use of a standardized protocol tailored for this examination and automatic  exposure control for dose modulation. The thyroid gland is unremarkable. No axillary adenopathy. There is no mediastinal or hilar adenopathy. No evidence for anterior  mediastinal mass or thymoma. Small pericardial effusion has decreased. No  pleural effusions.     The lungs are clear of an acute process. The 3 mm pulmonary nodule in the left  upper lobe with smooth margins is stable. No new pulmonary nodules. Review of bone windows is unremarkable. Incidental imaging through the upper abdomen reveals a new oval-shaped  low-density possibly cystic lesion adjacent to the pancreas and possibly arising  from the pancreas. This projects just inferior to the stomach as well. Exact  origin of this is uncertain. This was not present previously. This measures 2.2  x 1.6 cm. Further evaluation is suggested. MRI of the abdomen is more  information. In the anterior aspect of the spleen is a new rounded hypodensity measuring 8  mm. This was not present previously. Posteriorly in the spleen is a small  hypodensity that has not changed significantly when compared to previous exam.    No adrenal lesions. Impression 1. No evidence for thymoma  2. Stable pulmonary nodule in the left upper lobe. 3. There is a new low density probably cystic lesion in the upper abdomen  adjacent to the pancreas and stomach. MRI of the pancreas is suggested. Exact  origin of this new lesion is uncertain. 4. New small hypodensity in the spleen too small to characterize. 23X           MRI Results (recent):  Results from [place] encounter on 03/19/20   MRI ABD W WO CONT    Narrative EXAM:  MRI ABD W WO CONT    INDICATION:   Follow-up pancreatic cyst    COMPARISON: 6/18/2019    CONTRAST:  10 mL of Gadavist.    TECHNIQUE:   MR of the abdomen was performed and the following sequences were obtained:  Coronal HASTE, multiplanar MRCP, axial in and out-of-phase T1, axial T1  fat-saturated, axial T2, and pre- and post contrast T1-weighted images. FINDINGS:  The liver is normal. There is a 5 mm cyst in segment 2 . Patient status post  cholecystectomy. The spleen is normal. There are 2 tiny cysts measuring 5 mm  and 6 mm. There is no pancreatic ductal dilatation. . There is a 4 x 2 mm cyst in the  pancreatic head. There is a 1.5 x 0.6 cm cyst in pancreatic neck. There is a 2.1  x 1.7 cm cyst projected off of the pancreatic body superiorly. There is a 1.1 x  0.3 cm cyst in the pancreatic body inferiorly. There is a 1.0 x 0.7cm cyst in  pancreatic tail. There is no adrenal lesion. .  The kidneys are without evidence of mass lesion or  hydronephrosis. . Right renal cyst.    There is no intra or extrahepatic biliary ductal dilatation. No biliary  strictures or filling defects are seen. There is no pancreas divisum. There is no lymphadenopathy or ascites. Impression IMPRESSION:    Stable scattered pancreatic T2 hyperdense foci the largest measuring 2.1 x 1.7  cm projected off the pancreatic body superiorly and most likely represent side  branch type IPMN. The remainder of the examination is also stable. No acute process is identified. Follow-up imaging in 12-24 months is suggested. IR Results (recent):  No results found for this or any previous visit. VAS/US Results (recent):  No results found for this or any previous visit. Reviewed records in [facility] and [facility] tab today    Lab Review     No visits with results within 3 Month(s) from this visit. Latest known visit with results is:   Telephone on 10/24/2019   Component Date Value Ref Range Status    WBC 10/24/2019 7.5  3.4 - 10.8 x10E3/uL Final    RBC 10/24/2019 4.39  4.14 - 5.80 x10E6/uL Final    HGB 10/24/2019 13.1  13.0 - 17.7 g/dL Final    HCT 10/24/2019 38.7  37.5 - 51.0 % Final    MCV 10/24/2019 88  79 - 97 fL Final    MCH 10/24/2019 29.8  26.6 - 33.0 pg Final    MCHC 10/24/2019 33.9  31.5 - 35.7 g/dL Final    RDW 10/24/2019 14.6  12.3 - 15.4 % Final    PLATELET 20/03/7536 550  150 - 450 x10E3/uL Final    NEUTROPHILS 10/24/2019 82  Not Estab. % Final    Comment: Occasional myelocyte seen on scanning. Occasional metamyelocyte seen on scan.       Lymphocytes 10/24/2019 14  Not Estab. % Final    MONOCYTES 10/24/2019 4  Not Estab. % Final    EOSINOPHILS 10/24/2019 0  Not Estab. % Final    BASOPHILS 10/24/2019 0  Not Estab. % Final    ABS. NEUTROPHILS 10/24/2019 6.2  1.4 - 7.0 x10E3/uL Final    Abs Lymphocytes 10/24/2019 1.1  0.7 - 3.1 x10E3/uL Final    ABS. MONOCYTES 10/24/2019 0.3  0.1 - 0.9 x10E3/uL Final    ABS. EOSINOPHILS 10/24/2019 0.0  0.0 - 0.4 x10E3/uL Final    ABS. BASOPHILS 10/24/2019 0.0  0.0 - 0.2 x10E3/uL Final    Hematology comments: 10/24/2019 Note:   Final    Verified by microscopic examination. Objective:     General: alert, cooperative, no distress   Mental  status: mental status: alert, oriented to person, place, and time, normal mood, behavior, speech, dress, motor activity, and thought processes   Resp: resp: normal effort and no respiratory distress   Neuro: neuro: no gross deficits   Skin: skin: no discoloration or lesions of concern on visible areas     Due to this being a TeleHealth evaluation, many elements of the physical examination are unable to be assessed. Assessment:       ICD-10-CM ICD-9-CM    1. My[facility]henia gravis ([facility] G70.00 358.00 CBC WITH AUTOMATED DIFF          Plan:   1. Conitnue Cellcept 500 mg 2 tabs BID  2. Will check CBC 1 week before follow up  3. Will need to follow up with dermatologist to monitor for skin CA (history of Basal Cell CA0  4. Continue Prednisone 10 mg QD. Will consider decreasing to 5 mg depending on above  5. Continue Mestinon 60 mg 3 to 7 times a day   6. GI and bone prophylaxis         Follow-up and Dispositions    · Return in about 3 months (around 7/20/2020). CPT Codes 98189-25330 for Established Patients may apply to this Telehealth Visit    We discussed the expected course, resolution and complications of the diagnosis(es) in detail. Medication risks, benefits, costs, interactions, and alternatives were discussed as indicated.   I advised him to contact the office if his condition worsens, changes or fails to improve as anticipated. He expressed understanding with the diagnosis(es) and plan. Pursuant to the emergency declaration under the [facility], [facility] waiver authority and the [employer] Act, this Virtual  Visit was conducted, with patient's consent, to reduce the patient's risk of exposure to COVID-19 and provide continuity of care for an established patient. Services were provided through a video synchronous discussion virtually to substitute for in-person clinic visit.        Roberto Evangelista MD  Diplomate, American Board of Psychiatry and Neurology  Diplomate, Neuromuscular Medicine  Diplomate, American Board of Electrodiagnostic Medicine

## 2024-08-16 ENCOUNTER — HOSPITAL ENCOUNTER (OUTPATIENT)
Facility: HOSPITAL | Age: 69
End: 2024-08-16
Attending: FAMILY MEDICINE
Payer: MEDICARE

## 2024-08-16 DIAGNOSIS — N18.31 STAGE 3A CHRONIC KIDNEY DISEASE (HCC): ICD-10-CM

## 2024-08-16 PROCEDURE — 76770 US EXAM ABDO BACK WALL COMP: CPT

## 2024-10-08 RX ORDER — MYCOPHENOLATE MOFETIL 500 MG/1
1000 TABLET ORAL 2 TIMES DAILY
Qty: 360 TABLET | Refills: 1 | Status: SHIPPED | OUTPATIENT
Start: 2024-10-08

## 2024-11-11 ENCOUNTER — OFFICE VISIT (OUTPATIENT)
Age: 69
End: 2024-11-11
Payer: MEDICARE

## 2024-11-11 VITALS
HEART RATE: 96 BPM | BODY MASS INDEX: 31.8 KG/M2 | DIASTOLIC BLOOD PRESSURE: 60 MMHG | TEMPERATURE: 96.4 F | OXYGEN SATURATION: 100 % | HEIGHT: 71 IN | SYSTOLIC BLOOD PRESSURE: 120 MMHG

## 2024-11-11 DIAGNOSIS — G70.00 MYASTHENIA GRAVIS WITHOUT (ACUTE) EXACERBATION (HCC): Primary | ICD-10-CM

## 2024-11-11 PROCEDURE — 99214 OFFICE O/P EST MOD 30 MIN: CPT | Performed by: PSYCHIATRY & NEUROLOGY

## 2024-11-11 PROCEDURE — 1123F ACP DISCUSS/DSCN MKR DOCD: CPT | Performed by: PSYCHIATRY & NEUROLOGY

## 2024-11-11 PROCEDURE — 3074F SYST BP LT 130 MM HG: CPT | Performed by: PSYCHIATRY & NEUROLOGY

## 2024-11-11 PROCEDURE — 3078F DIAST BP <80 MM HG: CPT | Performed by: PSYCHIATRY & NEUROLOGY

## 2024-11-11 NOTE — PROGRESS NOTES
Neurology Progress Note    Patient ID:  Bashir Harvey  [id number]  69 y.o.  1955      Subjective:   History:  Mr. Bashir Harvey is a  Male with Asthma; H/O seasonal allergies; Hyperlipemia; and Hypertension Lower back surgery 1995, who was admitted at [facility] for diplopia that started last 8/6/16, noticeable on R gaze, associated with R eye discomfort. Patient (+) AChR Abs. CT chest negative for thymoma.His allergy to ragweed can cause flare ups described as increased blurred vision and has to adjust his Prednisone. On Ad[place] 3, 2020, patient had open thymectomy (due to large thymus) complicated with pneumothorax.      Patient still takes Mestinon 60 mg TID.   Still on Prednisone 5 mg every day and Cellcept 500 mg 2 tabs BID.  Remains stable     Recent WBC WNL        ROS:  Per HPI-  Otherwise the remainder of ROS was negative    Social Hx  Social History     Socioeconomic History    Marital status:    Tobacco Use    Smoking status: Former    Smokeless tobacco: Never   Substance and Sexual Activity    Alcohol use: No    Drug use: Not Currently       Meds:  Current Outpatient Medications on File Prior to Visit   Medication Sig Dispense Refill    mycophenolate (CELLCEPT) 500 MG tablet TAKE 2 TABLETS BY MOUTH TWICE DAILY 360 tablet 1    amitriptyline (ELAVIL) 25 MG tablet ceived the following from [facility]: Outside name: amitriptyline (ELAVIL) 25 mg tablet      amLODIPine-atorvastatatin (CADUET) 10-20 MG per tablet Take 1 tablet by mouth      vitamin D 25 MCG (1000 UT) CAPS Take 1 capsule by mouth daily      choline fenofibrate (TRILIPIX) 135 MG CPDR delayed release capsule Take 1 capsule by mouth      finasteride (PROSCAR) 5 MG tablet Take 1 tablet by mouth daily      fluticasone (FLONASE) 50 MCG/ACT nasal spray 2 sprays by Nasal route daily as needed      furosemide (LASIX) 20 MG tablet Take 1 tablet by mouth daily      irbesartan (AVAPRO) 300 MG tablet Take 1 tablet by mouth nightly

## 2025-04-06 RX ORDER — MYCOPHENOLATE MOFETIL 500 MG/1
1000 TABLET ORAL 2 TIMES DAILY
Qty: 360 TABLET | Refills: 1 | Status: SHIPPED | OUTPATIENT
Start: 2025-04-06

## (undated) DEVICE — SNARE ENDOSCP M L240CM W27MM SHTH DIA2.4MM CHN 2.8MM OVL

## (undated) DEVICE — CONTAINER SPEC 20 ML LID NEUT BUFF FORMALIN 10 % POLYPR STS

## (undated) DEVICE — SET GRAV CK VLV NEEDLESS ST 3 GANGED 4WAY STPCOCK HI FLO 10

## (undated) DEVICE — CATH IV AUTOGRD BC PNK 20GA 25 -- INSYTE

## (undated) DEVICE — ELECTRODE,RADIOTRANSLUCENT,FOAM,3PK: Brand: MEDLINE

## (undated) DEVICE — Device

## (undated) DEVICE — CANN NASAL O2 CAPNOGRAPHY AD -- FILTERLINE

## (undated) DEVICE — 1200 GUARD II KIT W/5MM TUBE W/O VAC TUBE: Brand: GUARDIAN

## (undated) DEVICE — BAG BELONG PT PERS CLEAR HANDL

## (undated) DEVICE — SOLIDIFIER MEDC 1200ML -- CONVERT TO 356117

## (undated) DEVICE — KIT COLON W/ 1.1OZ LUB AND 2 END

## (undated) DEVICE — BAG SPEC BIOHZRD 10 X 10 IN --

## (undated) DEVICE — POLYP TRAP: Brand: TRAPEASE®

## (undated) DEVICE — JELLY,LUBE,STERILE,FLIP TOP,TUBE,4-OZ: Brand: MEDLINE